# Patient Record
Sex: MALE | Race: WHITE | NOT HISPANIC OR LATINO | Employment: FULL TIME | URBAN - METROPOLITAN AREA
[De-identification: names, ages, dates, MRNs, and addresses within clinical notes are randomized per-mention and may not be internally consistent; named-entity substitution may affect disease eponyms.]

---

## 2022-01-01 ENCOUNTER — APPOINTMENT (OUTPATIENT)
Dept: ULTRASOUND IMAGING | Facility: HOSPITAL | Age: 57
DRG: 207 | End: 2022-01-01
Attending: INTERNAL MEDICINE

## 2022-01-01 ENCOUNTER — APPOINTMENT (OUTPATIENT)
Dept: CT IMAGING | Facility: HOSPITAL | Age: 57
DRG: 207 | End: 2022-01-01
Attending: PSYCHIATRY & NEUROLOGY

## 2022-01-01 ENCOUNTER — APPOINTMENT (OUTPATIENT)
Dept: NEUROLOGY | Facility: HOSPITAL | Age: 57
DRG: 207 | End: 2022-01-01
Attending: PSYCHIATRY & NEUROLOGY

## 2022-01-01 ENCOUNTER — APPOINTMENT (OUTPATIENT)
Dept: RADIOLOGY | Facility: HOSPITAL | Age: 57
DRG: 207 | End: 2022-01-01
Attending: INTERNAL MEDICINE

## 2022-01-01 ENCOUNTER — APPOINTMENT (OUTPATIENT)
Dept: CARDIOLOGY | Facility: HOSPITAL | Age: 57
DRG: 207 | End: 2022-01-01
Attending: INTERNAL MEDICINE

## 2022-01-01 ENCOUNTER — APPOINTMENT (OUTPATIENT)
Dept: MRI IMAGING | Facility: HOSPITAL | Age: 57
DRG: 207 | End: 2022-01-01
Attending: INTERNAL MEDICINE

## 2022-01-01 ENCOUNTER — HOSPITAL ENCOUNTER (EMERGENCY)
Facility: CLINIC | Age: 57
Discharge: OTHER INSTITUTION | End: 2022-01-14
Attending: EMERGENCY MEDICINE | Admitting: INTERNAL MEDICINE

## 2022-01-01 ENCOUNTER — APPOINTMENT (OUTPATIENT)
Dept: GENERAL RADIOLOGY | Facility: CLINIC | Age: 57
End: 2022-01-01
Attending: EMERGENCY MEDICINE

## 2022-01-01 ENCOUNTER — HOSPITAL ENCOUNTER (INPATIENT)
Facility: HOSPITAL | Age: 57
LOS: 8 days | DRG: 207 | End: 2022-01-22
Attending: INTERNAL MEDICINE | Admitting: INTERNAL MEDICINE

## 2022-01-01 ENCOUNTER — APPOINTMENT (OUTPATIENT)
Dept: CT IMAGING | Facility: HOSPITAL | Age: 57
DRG: 207 | End: 2022-01-01
Attending: INTERNAL MEDICINE

## 2022-01-01 VITALS
DIASTOLIC BLOOD PRESSURE: 106 MMHG | SYSTOLIC BLOOD PRESSURE: 149 MMHG | HEART RATE: 132 BPM | OXYGEN SATURATION: 91 % | RESPIRATION RATE: 20 BRPM | WEIGHT: 154.32 LBS

## 2022-01-01 VITALS
SYSTOLIC BLOOD PRESSURE: 132 MMHG | BODY MASS INDEX: 29.66 KG/M2 | TEMPERATURE: 103.1 F | RESPIRATION RATE: 26 BRPM | HEIGHT: 73 IN | WEIGHT: 223.77 LBS | DIASTOLIC BLOOD PRESSURE: 58 MMHG

## 2022-01-01 DIAGNOSIS — I21.3 ST ELEVATION MYOCARDIAL INFARCTION (STEMI), UNSPECIFIED ARTERY (H): ICD-10-CM

## 2022-01-01 DIAGNOSIS — I46.9 CARDIAC ARREST (H): ICD-10-CM

## 2022-01-01 LAB
ABO/RH(D): NORMAL
ACT BLD: 203 SECONDS (ref 74–150)
ACT BLD: 267 SECONDS (ref 74–150)
ACT BLD: 297 SECONDS (ref 74–150)
ALBUMIN SERPL-MCNC: 2.4 G/DL (ref 3.5–5)
ALBUMIN SERPL-MCNC: 2.6 G/DL (ref 3.5–5)
ALBUMIN SERPL-MCNC: 2.9 G/DL (ref 3.5–5)
ALBUMIN SERPL-MCNC: 3.6 G/DL (ref 3.4–5)
ALP SERPL-CCNC: 100 U/L (ref 45–120)
ALP SERPL-CCNC: 148 U/L (ref 40–150)
ALP SERPL-CCNC: 74 U/L (ref 45–120)
ALP SERPL-CCNC: 75 U/L (ref 45–120)
ALT SERPL W P-5'-P-CCNC: 123 U/L (ref 0–45)
ALT SERPL W P-5'-P-CCNC: 166 U/L (ref 0–45)
ALT SERPL W P-5'-P-CCNC: 242 U/L (ref 0–70)
ALT SERPL W P-5'-P-CCNC: 280 U/L (ref 0–45)
ALT SERPL W P-5'-P-CCNC: 56 U/L (ref 0–45)
ANION GAP SERPL CALCULATED.3IONS-SCNC: 10 MMOL/L (ref 5–18)
ANION GAP SERPL CALCULATED.3IONS-SCNC: 17 MMOL/L (ref 3–14)
ANION GAP SERPL CALCULATED.3IONS-SCNC: 7 MMOL/L (ref 5–18)
ANION GAP SERPL CALCULATED.3IONS-SCNC: 7 MMOL/L (ref 5–18)
ANION GAP SERPL CALCULATED.3IONS-SCNC: 8 MMOL/L (ref 5–18)
ANION GAP SERPL CALCULATED.3IONS-SCNC: 8 MMOL/L (ref 5–18)
ANION GAP SERPL CALCULATED.3IONS-SCNC: 9 MMOL/L (ref 5–18)
ANION GAP SERPL CALCULATED.3IONS-SCNC: 9 MMOL/L (ref 5–18)
ANTIBODY SCREEN: NEGATIVE
APTT PPP: 22 SECONDS (ref 22–38)
APTT PPP: 24 SECONDS (ref 22–38)
APTT PPP: 26 SECONDS (ref 22–38)
APTT PPP: 26 SECONDS (ref 22–38)
APTT PPP: 27 SECONDS (ref 22–38)
APTT PPP: 31 SECONDS (ref 22–38)
AST SERPL W P-5'-P-CCNC: 131 U/L (ref 0–40)
AST SERPL W P-5'-P-CCNC: 175 U/L (ref 0–45)
AST SERPL W P-5'-P-CCNC: 215 U/L (ref 0–40)
AST SERPL W P-5'-P-CCNC: 37 U/L (ref 0–40)
AST SERPL W P-5'-P-CCNC: 578 U/L (ref 0–40)
ATRIAL RATE - MUSE: 139 BPM
ATRIAL RATE - MUSE: 62 BPM
ATRIAL RATE - MUSE: 63 BPM
ATRIAL RATE - MUSE: 98 BPM
BACTERIA BLD CULT: NO GROWTH
BACTERIA SPEC CULT: ABNORMAL
BACTERIA SPT CULT: NO GROWTH
BASE EXCESS BLDA CALC-SCNC: -3.8 MMOL/L
BASE EXCESS BLDA CALC-SCNC: -5 MMOL/L
BASE EXCESS BLDA CALC-SCNC: -6.8 MMOL/L
BASE EXCESS BLDA CALC-SCNC: -8.4 MMOL/L
BASE EXCESS BLDA CALC-SCNC: 9 MMOL/L
BASE EXCESS BLDV CALC-SCNC: -14.7 MMOL/L (ref -7.7–1.9)
BASOPHILS # BLD AUTO: 0 10E3/UL (ref 0–0.2)
BASOPHILS # BLD AUTO: 0.1 10E3/UL (ref 0–0.2)
BASOPHILS NFR BLD AUTO: 0 %
BASOPHILS NFR BLD AUTO: 1 %
BILIRUB SERPL-MCNC: 0.4 MG/DL (ref 0–1)
BILIRUB SERPL-MCNC: 0.6 MG/DL (ref 0.2–1.3)
BILIRUB SERPL-MCNC: 0.6 MG/DL (ref 0–1)
BILIRUB SERPL-MCNC: 0.7 MG/DL (ref 0–1)
BUN SERPL-MCNC: 20 MG/DL (ref 7–30)
BUN SERPL-MCNC: 23 MG/DL (ref 8–22)
BUN SERPL-MCNC: 23 MG/DL (ref 8–22)
BUN SERPL-MCNC: 25 MG/DL (ref 8–22)
BUN SERPL-MCNC: 26 MG/DL (ref 8–22)
BUN SERPL-MCNC: 26 MG/DL (ref 8–22)
BUN SERPL-MCNC: 31 MG/DL (ref 8–22)
BUN SERPL-MCNC: 31 MG/DL (ref 8–22)
BUN SERPL-MCNC: 38 MG/DL (ref 8–22)
BUN SERPL-MCNC: 38 MG/DL (ref 8–22)
CALCIUM SERPL-MCNC: 7.4 MG/DL (ref 8.5–10.5)
CALCIUM SERPL-MCNC: 7.4 MG/DL (ref 8.5–10.5)
CALCIUM SERPL-MCNC: 7.5 MG/DL (ref 8.5–10.5)
CALCIUM SERPL-MCNC: 7.5 MG/DL (ref 8.5–10.5)
CALCIUM SERPL-MCNC: 7.8 MG/DL (ref 8.5–10.5)
CALCIUM SERPL-MCNC: 7.8 MG/DL (ref 8.5–10.5)
CALCIUM SERPL-MCNC: 8 MG/DL (ref 8.5–10.5)
CALCIUM SERPL-MCNC: 8 MG/DL (ref 8.5–10.5)
CALCIUM SERPL-MCNC: 8.1 MG/DL (ref 8.5–10.5)
CALCIUM SERPL-MCNC: 8.9 MG/DL (ref 8.5–10.1)
CALCIUM, IONIZED MEASURED: 1.06 MMOL/L (ref 1.11–1.3)
CHLORIDE BLD-SCNC: 108 MMOL/L (ref 94–109)
CHLORIDE BLD-SCNC: 109 MMOL/L (ref 98–107)
CHLORIDE BLD-SCNC: 111 MMOL/L (ref 98–107)
CHLORIDE BLD-SCNC: 112 MMOL/L (ref 98–107)
CHLORIDE BLD-SCNC: 113 MMOL/L (ref 98–107)
CHLORIDE BLD-SCNC: 116 MMOL/L (ref 98–107)
CHLORIDE BLD-SCNC: 116 MMOL/L (ref 98–107)
CHLORIDE BLD-SCNC: 117 MMOL/L (ref 98–107)
CHLORIDE BLD-SCNC: 118 MMOL/L (ref 98–107)
CHLORIDE BLD-SCNC: 118 MMOL/L (ref 98–107)
CO2 SERPL-SCNC: 15 MMOL/L (ref 20–32)
CO2 SERPL-SCNC: 15 MMOL/L (ref 22–31)
CO2 SERPL-SCNC: 17 MMOL/L (ref 22–31)
CO2 SERPL-SCNC: 19 MMOL/L (ref 22–31)
CO2 SERPL-SCNC: 20 MMOL/L (ref 22–31)
CO2 SERPL-SCNC: 20 MMOL/L (ref 22–31)
CO2 SERPL-SCNC: 22 MMOL/L (ref 22–31)
CO2 SERPL-SCNC: 27 MMOL/L (ref 22–31)
CO2 SERPL-SCNC: 30 MMOL/L (ref 22–31)
CO2 SERPL-SCNC: 36 MMOL/L (ref 22–31)
COHGB MFR BLD: 96.2 % (ref 96–97)
COHGB MFR BLD: 98 % (ref 92–100)
COHGB MFR BLD: 98.9 % (ref 96–97)
COHGB MFR BLD: 99.1 % (ref 96–97)
COHGB MFR BLD: 99.6 % (ref 96–97)
COHGB MFR BLD: 99.6 % (ref 96–97)
CREAT SERPL-MCNC: 0.96 MG/DL (ref 0.7–1.3)
CREAT SERPL-MCNC: 1.02 MG/DL (ref 0.7–1.3)
CREAT SERPL-MCNC: 1.03 MG/DL (ref 0.7–1.3)
CREAT SERPL-MCNC: 1.1 MG/DL (ref 0.7–1.3)
CREAT SERPL-MCNC: 1.12 MG/DL (ref 0.7–1.3)
CREAT SERPL-MCNC: 1.3 MG/DL (ref 0.7–1.3)
CREAT SERPL-MCNC: 1.38 MG/DL (ref 0.7–1.3)
CREAT SERPL-MCNC: 1.39 MG/DL (ref 0.7–1.3)
CREAT SERPL-MCNC: 1.4 MG/DL (ref 0.7–1.3)
CREAT SERPL-MCNC: 1.43 MG/DL (ref 0.66–1.25)
DIASTOLIC BLOOD PRESSURE - MUSE: NORMAL MMHG
EOSINOPHIL # BLD AUTO: 0 10E3/UL (ref 0–0.7)
EOSINOPHIL # BLD AUTO: 0.1 10E3/UL (ref 0–0.7)
EOSINOPHIL NFR BLD AUTO: 0 %
EOSINOPHIL NFR BLD AUTO: 1 %
ERYTHROCYTE [DISTWIDTH] IN BLOOD BY AUTOMATED COUNT: 13.3 % (ref 10–15)
ERYTHROCYTE [DISTWIDTH] IN BLOOD BY AUTOMATED COUNT: 13.4 % (ref 10–15)
ERYTHROCYTE [DISTWIDTH] IN BLOOD BY AUTOMATED COUNT: 14 % (ref 10–15)
ERYTHROCYTE [DISTWIDTH] IN BLOOD BY AUTOMATED COUNT: 14.5 % (ref 10–15)
ERYTHROCYTE [DISTWIDTH] IN BLOOD BY AUTOMATED COUNT: 14.7 % (ref 10–15)
ERYTHROCYTE [DISTWIDTH] IN BLOOD BY AUTOMATED COUNT: 14.9 % (ref 10–15)
ERYTHROCYTE [DISTWIDTH] IN BLOOD BY AUTOMATED COUNT: 15 % (ref 10–15)
ERYTHROCYTE [DISTWIDTH] IN BLOOD BY AUTOMATED COUNT: 15.1 % (ref 10–15)
ERYTHROCYTE [DISTWIDTH] IN BLOOD BY AUTOMATED COUNT: 15.1 % (ref 10–15)
FIBRINOGEN PPP-MCNC: 357 MG/DL (ref 170–490)
FIBRINOGEN PPP-MCNC: 367 MG/DL (ref 170–490)
FIBRINOGEN PPP-MCNC: 377 MG/DL (ref 170–490)
FIBRINOGEN PPP-MCNC: 407 MG/DL (ref 170–490)
FIBRINOGEN PPP-MCNC: 452 MG/DL (ref 170–490)
FIBRINOGEN PPP-MCNC: 470 MG/DL (ref 170–490)
FIBRINOGEN PPP-MCNC: 594 MG/DL (ref 170–490)
FIBRINOGEN PPP-MCNC: 610 MG/DL (ref 170–490)
FIBRINOGEN PPP-MCNC: 732 MG/DL (ref 170–490)
GFR SERPL CREATININE-BSD FRML MDRD: 58 ML/MIN/1.73M2
GFR SERPL CREATININE-BSD FRML MDRD: 59 ML/MIN/1.73M2
GFR SERPL CREATININE-BSD FRML MDRD: 59 ML/MIN/1.73M2
GFR SERPL CREATININE-BSD FRML MDRD: 60 ML/MIN/1.73M2
GFR SERPL CREATININE-BSD FRML MDRD: 64 ML/MIN/1.73M2
GFR SERPL CREATININE-BSD FRML MDRD: 77 ML/MIN/1.73M2
GFR SERPL CREATININE-BSD FRML MDRD: 79 ML/MIN/1.73M2
GFR SERPL CREATININE-BSD FRML MDRD: 85 ML/MIN/1.73M2
GFR SERPL CREATININE-BSD FRML MDRD: 86 ML/MIN/1.73M2
GFR SERPL CREATININE-BSD FRML MDRD: >90 ML/MIN/1.73M2
GLUCOSE BLD-MCNC: 103 MG/DL (ref 70–125)
GLUCOSE BLD-MCNC: 113 MG/DL (ref 70–125)
GLUCOSE BLD-MCNC: 120 MG/DL (ref 70–125)
GLUCOSE BLD-MCNC: 136 MG/DL (ref 70–125)
GLUCOSE BLD-MCNC: 147 MG/DL (ref 70–125)
GLUCOSE BLD-MCNC: 172 MG/DL (ref 70–125)
GLUCOSE BLD-MCNC: 175 MG/DL (ref 70–125)
GLUCOSE BLD-MCNC: 189 MG/DL (ref 70–125)
GLUCOSE BLD-MCNC: 189 MG/DL (ref 70–125)
GLUCOSE BLD-MCNC: 300 MG/DL (ref 70–99)
GLUCOSE BLDC GLUCOMTR-MCNC: 100 MG/DL (ref 70–99)
GLUCOSE BLDC GLUCOMTR-MCNC: 101 MG/DL (ref 70–99)
GLUCOSE BLDC GLUCOMTR-MCNC: 102 MG/DL (ref 70–99)
GLUCOSE BLDC GLUCOMTR-MCNC: 103 MG/DL (ref 70–99)
GLUCOSE BLDC GLUCOMTR-MCNC: 103 MG/DL (ref 70–99)
GLUCOSE BLDC GLUCOMTR-MCNC: 104 MG/DL (ref 70–99)
GLUCOSE BLDC GLUCOMTR-MCNC: 104 MG/DL (ref 70–99)
GLUCOSE BLDC GLUCOMTR-MCNC: 105 MG/DL (ref 70–99)
GLUCOSE BLDC GLUCOMTR-MCNC: 106 MG/DL (ref 70–99)
GLUCOSE BLDC GLUCOMTR-MCNC: 106 MG/DL (ref 70–99)
GLUCOSE BLDC GLUCOMTR-MCNC: 107 MG/DL (ref 70–99)
GLUCOSE BLDC GLUCOMTR-MCNC: 107 MG/DL (ref 70–99)
GLUCOSE BLDC GLUCOMTR-MCNC: 108 MG/DL (ref 70–99)
GLUCOSE BLDC GLUCOMTR-MCNC: 110 MG/DL (ref 70–99)
GLUCOSE BLDC GLUCOMTR-MCNC: 111 MG/DL (ref 70–99)
GLUCOSE BLDC GLUCOMTR-MCNC: 112 MG/DL (ref 70–99)
GLUCOSE BLDC GLUCOMTR-MCNC: 113 MG/DL (ref 70–99)
GLUCOSE BLDC GLUCOMTR-MCNC: 113 MG/DL (ref 70–99)
GLUCOSE BLDC GLUCOMTR-MCNC: 114 MG/DL (ref 70–99)
GLUCOSE BLDC GLUCOMTR-MCNC: 116 MG/DL (ref 70–99)
GLUCOSE BLDC GLUCOMTR-MCNC: 118 MG/DL (ref 70–99)
GLUCOSE BLDC GLUCOMTR-MCNC: 119 MG/DL (ref 70–99)
GLUCOSE BLDC GLUCOMTR-MCNC: 122 MG/DL (ref 70–99)
GLUCOSE BLDC GLUCOMTR-MCNC: 125 MG/DL (ref 70–99)
GLUCOSE BLDC GLUCOMTR-MCNC: 127 MG/DL (ref 70–99)
GLUCOSE BLDC GLUCOMTR-MCNC: 128 MG/DL (ref 70–99)
GLUCOSE BLDC GLUCOMTR-MCNC: 129 MG/DL (ref 70–99)
GLUCOSE BLDC GLUCOMTR-MCNC: 131 MG/DL (ref 70–99)
GLUCOSE BLDC GLUCOMTR-MCNC: 139 MG/DL (ref 70–99)
GLUCOSE BLDC GLUCOMTR-MCNC: 142 MG/DL (ref 70–99)
GLUCOSE BLDC GLUCOMTR-MCNC: 143 MG/DL (ref 70–99)
GLUCOSE BLDC GLUCOMTR-MCNC: 146 MG/DL (ref 70–99)
GLUCOSE BLDC GLUCOMTR-MCNC: 147 MG/DL (ref 70–99)
GLUCOSE BLDC GLUCOMTR-MCNC: 147 MG/DL (ref 70–99)
GLUCOSE BLDC GLUCOMTR-MCNC: 148 MG/DL (ref 70–99)
GLUCOSE BLDC GLUCOMTR-MCNC: 155 MG/DL (ref 70–99)
GLUCOSE BLDC GLUCOMTR-MCNC: 156 MG/DL (ref 70–99)
GLUCOSE BLDC GLUCOMTR-MCNC: 166 MG/DL (ref 70–99)
GLUCOSE BLDC GLUCOMTR-MCNC: 168 MG/DL (ref 70–99)
GLUCOSE BLDC GLUCOMTR-MCNC: 174 MG/DL (ref 70–99)
GLUCOSE BLDC GLUCOMTR-MCNC: 176 MG/DL (ref 70–99)
GLUCOSE BLDC GLUCOMTR-MCNC: 177 MG/DL (ref 70–99)
GLUCOSE BLDC GLUCOMTR-MCNC: 179 MG/DL (ref 70–99)
GLUCOSE BLDC GLUCOMTR-MCNC: 197 MG/DL (ref 70–99)
GLUCOSE BLDC GLUCOMTR-MCNC: 209 MG/DL (ref 70–99)
GLUCOSE BLDC GLUCOMTR-MCNC: 216 MG/DL (ref 70–99)
GLUCOSE BLDC GLUCOMTR-MCNC: 217 MG/DL (ref 70–99)
GLUCOSE BLDC GLUCOMTR-MCNC: 246 MG/DL (ref 70–99)
GLUCOSE BLDC GLUCOMTR-MCNC: 248 MG/DL (ref 70–99)
GLUCOSE BLDC GLUCOMTR-MCNC: 248 MG/DL (ref 70–99)
GLUCOSE BLDC GLUCOMTR-MCNC: 82 MG/DL (ref 70–99)
GLUCOSE BLDC GLUCOMTR-MCNC: 86 MG/DL (ref 70–99)
GLUCOSE BLDC GLUCOMTR-MCNC: 95 MG/DL (ref 70–99)
GLUCOSE BLDC GLUCOMTR-MCNC: 98 MG/DL (ref 70–99)
GLUCOSE BLDC GLUCOMTR-MCNC: 99 MG/DL (ref 70–99)
GLUCOSE BLDC GLUCOMTR-MCNC: 99 MG/DL (ref 70–99)
GRAM STAIN RESULT: NORMAL
GRAM STAIN RESULT: NORMAL
HBA1C MFR BLD: 5.5 %
HCO3 BLD-SCNC: 18 MMOL/L (ref 23–29)
HCO3 BLD-SCNC: 20 MMOL/L (ref 23–29)
HCO3 BLD-SCNC: 21 MMOL/L (ref 23–29)
HCO3 BLD-SCNC: 22 MMOL/L (ref 23–29)
HCO3 BLD-SCNC: 31 MMOL/L (ref 23–29)
HCO3 BLDA-SCNC: 20 MMOL/L (ref 21–28)
HCO3 BLDV-SCNC: 14 MMOL/L (ref 21–28)
HCO3 BLDV-SCNC: 15 MMOL/L (ref 21–28)
HCT VFR BLD AUTO: 28.6 % (ref 40–53)
HCT VFR BLD AUTO: 29.4 % (ref 40–53)
HCT VFR BLD AUTO: 30.1 % (ref 40–53)
HCT VFR BLD AUTO: 30.2 % (ref 40–53)
HCT VFR BLD AUTO: 30.8 % (ref 40–53)
HCT VFR BLD AUTO: 31.9 % (ref 40–53)
HCT VFR BLD AUTO: 32.1 % (ref 40–53)
HCT VFR BLD AUTO: 40.6 % (ref 40–53)
HCT VFR BLD AUTO: 50.6 % (ref 40–53)
HGB BLD-MCNC: 10.1 G/DL (ref 13.3–17.7)
HGB BLD-MCNC: 10.2 G/DL (ref 13.3–17.7)
HGB BLD-MCNC: 10.6 G/DL (ref 13.3–17.7)
HGB BLD-MCNC: 10.9 G/DL (ref 13.3–17.7)
HGB BLD-MCNC: 12.7 G/DL (ref 13.3–17.7)
HGB BLD-MCNC: 13.7 G/DL (ref 13.3–17.7)
HGB BLD-MCNC: 16.5 G/DL (ref 13.3–17.7)
HGB BLD-MCNC: 9.4 G/DL (ref 13.3–17.7)
HGB BLD-MCNC: 9.8 G/DL (ref 13.3–17.7)
HGB BLD-MCNC: 9.9 G/DL (ref 13.3–17.7)
IMM GRANULOCYTES # BLD: 0.1 10E3/UL
IMM GRANULOCYTES # BLD: 0.2 10E3/UL
IMM GRANULOCYTES # BLD: 0.5 10E3/UL
IMM GRANULOCYTES NFR BLD: 1 %
IMM GRANULOCYTES NFR BLD: 3 %
INR PPP: 1.04 (ref 0.85–1.15)
INR PPP: 1.12 (ref 0.9–1.15)
INR PPP: 1.14 (ref 0.9–1.15)
INR PPP: 1.18 (ref 0.9–1.15)
INR PPP: 1.24 (ref 0.9–1.15)
INR PPP: 1.26 (ref 0.9–1.15)
INR PPP: 1.29 (ref 0.9–1.15)
INTERPRETATION ECG - MUSE: NORMAL
ION CA PH 7.4: 1.02 MMOL/L (ref 1.11–1.3)
LACTATE BLD-SCNC: 1.5 MMOL/L
LACTATE BLD-SCNC: 9.5 MMOL/L
LEVETIRACETAM (KEPPRA): 24.2 UG/ML (ref 6–46)
LVEF ECHO: NORMAL
LYMPHOCYTES # BLD AUTO: 1.1 10E3/UL (ref 0.8–5.3)
LYMPHOCYTES # BLD AUTO: 1.6 10E3/UL (ref 0.8–5.3)
LYMPHOCYTES # BLD AUTO: 1.6 10E3/UL (ref 0.8–5.3)
LYMPHOCYTES # BLD AUTO: 2 10E3/UL (ref 0.8–5.3)
LYMPHOCYTES # BLD AUTO: 4.5 10E3/UL (ref 0.8–5.3)
LYMPHOCYTES NFR BLD AUTO: 10 %
LYMPHOCYTES NFR BLD AUTO: 31 %
LYMPHOCYTES NFR BLD AUTO: 7 %
LYMPHOCYTES NFR BLD AUTO: 7 %
LYMPHOCYTES NFR BLD AUTO: 8 %
Lab: NORMAL
MAGNESIUM SERPL-MCNC: 1.5 MG/DL (ref 1.8–2.6)
MAGNESIUM SERPL-MCNC: 1.9 MG/DL (ref 1.8–2.6)
MAGNESIUM SERPL-MCNC: 2.1 MG/DL (ref 1.8–2.6)
MAGNESIUM SERPL-MCNC: 2.2 MG/DL (ref 1.8–2.6)
MAGNESIUM SERPL-MCNC: 2.2 MG/DL (ref 1.8–2.6)
MAGNESIUM SERPL-MCNC: 2.3 MG/DL (ref 1.8–2.6)
MAGNESIUM SERPL-MCNC: 2.3 MG/DL (ref 1.8–2.6)
MAGNESIUM SERPL-MCNC: 2.7 MG/DL (ref 1.6–2.3)
MAYO MISC RESULT: ABNORMAL
MCH RBC QN AUTO: 30.1 PG (ref 26.5–33)
MCH RBC QN AUTO: 30.3 PG (ref 26.5–33)
MCH RBC QN AUTO: 30.3 PG (ref 26.5–33)
MCH RBC QN AUTO: 30.4 PG (ref 26.5–33)
MCH RBC QN AUTO: 30.4 PG (ref 26.5–33)
MCH RBC QN AUTO: 30.6 PG (ref 26.5–33)
MCH RBC QN AUTO: 31 PG (ref 26.5–33)
MCH RBC QN AUTO: 31 PG (ref 26.5–33)
MCH RBC QN AUTO: 31.1 PG (ref 26.5–33)
MCHC RBC AUTO-ENTMCNC: 32.6 G/DL (ref 31.5–36.5)
MCHC RBC AUTO-ENTMCNC: 32.8 G/DL (ref 31.5–36.5)
MCHC RBC AUTO-ENTMCNC: 32.9 G/DL (ref 31.5–36.5)
MCHC RBC AUTO-ENTMCNC: 32.9 G/DL (ref 31.5–36.5)
MCHC RBC AUTO-ENTMCNC: 33 G/DL (ref 31.5–36.5)
MCHC RBC AUTO-ENTMCNC: 33.3 G/DL (ref 31.5–36.5)
MCHC RBC AUTO-ENTMCNC: 33.7 G/DL (ref 31.5–36.5)
MCHC RBC AUTO-ENTMCNC: 33.8 G/DL (ref 31.5–36.5)
MCHC RBC AUTO-ENTMCNC: 34.2 G/DL (ref 31.5–36.5)
MCV RBC AUTO: 91 FL (ref 78–100)
MCV RBC AUTO: 91 FL (ref 78–100)
MCV RBC AUTO: 92 FL (ref 78–100)
MCV RBC AUTO: 93 FL (ref 78–100)
MONOCYTES # BLD AUTO: 0.3 10E3/UL (ref 0–1.3)
MONOCYTES # BLD AUTO: 0.7 10E3/UL (ref 0–1.3)
MONOCYTES # BLD AUTO: 0.7 10E3/UL (ref 0–1.3)
MONOCYTES # BLD AUTO: 1.2 10E3/UL (ref 0–1.3)
MONOCYTES # BLD AUTO: 1.2 10E3/UL (ref 0–1.3)
MONOCYTES NFR BLD AUTO: 2 %
MONOCYTES NFR BLD AUTO: 4 %
MONOCYTES NFR BLD AUTO: 5 %
MONOCYTES NFR BLD AUTO: 5 %
MONOCYTES NFR BLD AUTO: 6 %
NEUTROPHILS # BLD AUTO: 14.3 10E3/UL (ref 1.6–8.3)
NEUTROPHILS # BLD AUTO: 15.7 10E3/UL (ref 1.6–8.3)
NEUTROPHILS # BLD AUTO: 17.1 10E3/UL (ref 1.6–8.3)
NEUTROPHILS # BLD AUTO: 21.3 10E3/UL (ref 1.6–8.3)
NEUTROPHILS # BLD AUTO: 9.1 10E3/UL (ref 1.6–8.3)
NEUTROPHILS NFR BLD AUTO: 62 %
NEUTROPHILS NFR BLD AUTO: 83 %
NEUTROPHILS NFR BLD AUTO: 87 %
NRBC # BLD AUTO: 0 10E3/UL
NRBC BLD AUTO-RTO: 0 /100
O2/TOTAL GAS SETTING VFR VENT: 100 %
O2/TOTAL GAS SETTING VFR VENT: 40 %
O2/TOTAL GAS SETTING VFR VENT: 50 %
OXYHGB MFR BLD: 95.6 % (ref 96–97)
OXYHGB MFR BLD: 97.5 % (ref 96–97)
OXYHGB MFR BLD: 98.5 % (ref 96–97)
OXYHGB MFR BLD: >98.5 % (ref 96–97)
OXYHGB MFR BLD: >98.5 % (ref 96–97)
OXYHGB MFR BLDV: 91 % (ref 70–75)
P AXIS - MUSE: 50 DEGREES
P AXIS - MUSE: 54 DEGREES
P AXIS - MUSE: 57 DEGREES
P AXIS - MUSE: NORMAL DEGREES
PCO2 BLD: 34 MM HG (ref 35–45)
PCO2 BLD: 34 MM HG (ref 35–45)
PCO2 BLD: 35 MM HG (ref 35–45)
PCO2 BLD: 41 MM HG (ref 35–45)
PCO2 BLD: 53 MM HG (ref 35–45)
PCO2 BLDA: 45 MM HG (ref 35–45)
PCO2 BLDV: 40 MM HG (ref 40–50)
PCO2 BLDV: 41 MM HG (ref 40–50)
PEEP: 14 CM H2O
PEEP: 5 CM H2O
PEEP: 8 CM H2O
PEEP: 8 CM H2O
PERFORMING LABORATORY: NORMAL
PH BLD: 7.27 [PH] (ref 7.37–7.44)
PH BLD: 7.35 [PH] (ref 7.37–7.44)
PH BLD: 7.38 [PH] (ref 7.37–7.44)
PH BLD: 7.4 [PH] (ref 7.37–7.44)
PH BLD: 7.41 [PH] (ref 7.37–7.44)
PH BLDA: 7.25 [PH] (ref 7.35–7.45)
PH BLDV: 7.15 [PH] (ref 7.32–7.43)
PH BLDV: 7.16 [PH] (ref 7.32–7.43)
PH: 7.34 (ref 7.35–7.45)
PHOSPHATE SERPL-MCNC: 1.6 MG/DL (ref 2.5–4.5)
PHOSPHATE SERPL-MCNC: 2.8 MG/DL (ref 2.5–4.5)
PHOSPHATE SERPL-MCNC: 3.1 MG/DL (ref 2.5–4.5)
PHOSPHATE SERPL-MCNC: 4.4 MG/DL (ref 2.5–4.5)
PLATELET # BLD AUTO: 107 10E3/UL (ref 150–450)
PLATELET # BLD AUTO: 112 10E3/UL (ref 150–450)
PLATELET # BLD AUTO: 114 10E3/UL (ref 150–450)
PLATELET # BLD AUTO: 116 10E3/UL (ref 150–450)
PLATELET # BLD AUTO: 134 10E3/UL (ref 150–450)
PLATELET # BLD AUTO: 182 10E3/UL (ref 150–450)
PLATELET # BLD AUTO: 216 10E3/UL (ref 150–450)
PLATELET # BLD AUTO: 87 10E3/UL (ref 150–450)
PLATELET # BLD AUTO: 98 10E3/UL (ref 150–450)
PO2 BLD: 113 MM HG (ref 80–90)
PO2 BLD: 142 MM HG (ref 80–90)
PO2 BLD: 158 MM HG (ref 80–90)
PO2 BLD: 204 MM HG (ref 80–90)
PO2 BLD: 80 MM HG (ref 80–90)
PO2 BLDA: 122 MM HG (ref 80–105)
PO2 BLDV: 77 MM HG (ref 25–47)
PO2 BLDV: 82 MM HG (ref 25–47)
POTASSIUM BLD-SCNC: 2.9 MMOL/L (ref 3.5–5)
POTASSIUM BLD-SCNC: 3.1 MMOL/L (ref 3.5–5)
POTASSIUM BLD-SCNC: 3.1 MMOL/L (ref 3.5–5)
POTASSIUM BLD-SCNC: 3.2 MMOL/L (ref 3.4–5.3)
POTASSIUM BLD-SCNC: 3.2 MMOL/L (ref 3.5–5)
POTASSIUM BLD-SCNC: 3.3 MMOL/L (ref 3.5–5)
POTASSIUM BLD-SCNC: 3.5 MMOL/L (ref 3.5–5)
POTASSIUM BLD-SCNC: 3.6 MMOL/L (ref 3.5–5)
POTASSIUM BLD-SCNC: 3.8 MMOL/L (ref 3.5–5)
POTASSIUM BLD-SCNC: 3.8 MMOL/L (ref 3.5–5)
POTASSIUM BLD-SCNC: 4 MMOL/L (ref 3.5–5)
POTASSIUM BLD-SCNC: 4.1 MMOL/L (ref 3.5–5)
POTASSIUM BLD-SCNC: 4.2 MMOL/L (ref 3.5–5)
POTASSIUM BLD-SCNC: 4.3 MMOL/L (ref 3.5–5)
PR INTERVAL - MUSE: 144 MS
PR INTERVAL - MUSE: 148 MS
PR INTERVAL - MUSE: 150 MS
PR INTERVAL - MUSE: 154 MS
PROT SERPL-MCNC: 4.7 G/DL (ref 6–8)
PROT SERPL-MCNC: 5 G/DL (ref 6–8)
PROT SERPL-MCNC: 5.4 G/DL (ref 6–8)
PROT SERPL-MCNC: 7.3 G/DL (ref 6.8–8.8)
QRS DURATION - MUSE: 132 MS
QRS DURATION - MUSE: 78 MS
QRS DURATION - MUSE: 80 MS
QRS DURATION - MUSE: 90 MS
QT - MUSE: 290 MS
QT - MUSE: 354 MS
QT - MUSE: 398 MS
QT - MUSE: 494 MS
QTC - MUSE: 403 MS
QTC - MUSE: 441 MS
QTC - MUSE: 451 MS
QTC - MUSE: 505 MS
R AXIS - MUSE: -53 DEGREES
R AXIS - MUSE: -63 DEGREES
R AXIS - MUSE: 25 DEGREES
R AXIS - MUSE: 45 DEGREES
RATE: 16 RR/MIN
RATE: 20 RR/MIN
RBC # BLD AUTO: 3.12 10E6/UL (ref 4.4–5.9)
RBC # BLD AUTO: 3.16 10E6/UL (ref 4.4–5.9)
RBC # BLD AUTO: 3.26 10E6/UL (ref 4.4–5.9)
RBC # BLD AUTO: 3.29 10E6/UL (ref 4.4–5.9)
RBC # BLD AUTO: 3.33 10E6/UL (ref 4.4–5.9)
RBC # BLD AUTO: 3.5 10E6/UL (ref 4.4–5.9)
RBC # BLD AUTO: 3.51 10E6/UL (ref 4.4–5.9)
RBC # BLD AUTO: 4.47 10E6/UL (ref 4.4–5.9)
RBC # BLD AUTO: 5.43 10E6/UL (ref 4.4–5.9)
SAO2 % BLDV: 91 % (ref 94–100)
SARS-COV-2 RNA RESP QL NAA+PROBE: NEGATIVE
SODIUM SERPL-SCNC: 138 MMOL/L (ref 136–145)
SODIUM SERPL-SCNC: 140 MMOL/L (ref 133–144)
SODIUM SERPL-SCNC: 140 MMOL/L (ref 136–145)
SODIUM SERPL-SCNC: 144 MMOL/L (ref 136–145)
SODIUM SERPL-SCNC: 145 MMOL/L (ref 136–145)
SODIUM SERPL-SCNC: 146 MMOL/L (ref 136–145)
SODIUM SERPL-SCNC: 148 MMOL/L (ref 136–145)
SODIUM SERPL-SCNC: 149 MMOL/L (ref 136–145)
SODIUM SERPL-SCNC: 152 MMOL/L (ref 136–145)
SODIUM SERPL-SCNC: 152 MMOL/L (ref 136–145)
SPECIMEN EXPIRATION DATE: NORMAL
SPECIMEN STATUS: NORMAL
SYSTOLIC BLOOD PRESSURE - MUSE: NORMAL MMHG
T AXIS - MUSE: 169 DEGREES
T AXIS - MUSE: 53 DEGREES
T AXIS - MUSE: 85 DEGREES
T AXIS - MUSE: 95 DEGREES
TEMPERATURE: 37 DEGREES C
TEST NAME: NORMAL
TRIGL SERPL-MCNC: 228 MG/DL
TROPONIN I SERPL HS-MCNC: 1182 NG/L
TROPONIN T BLD-MCNC: 0.87 UG/L
UFH PPP CHRO-ACNC: 0.15 IU/ML
UFH PPP CHRO-ACNC: 0.43 IU/ML
UFH PPP CHRO-ACNC: 0.43 IU/ML
UFH PPP CHRO-ACNC: 0.47 IU/ML
UFH PPP CHRO-ACNC: 0.56 IU/ML
VANCOMYCIN SERPL-MCNC: 16.2 MG/L
VANCOMYCIN SERPL-MCNC: 17 MG/L
VENTILATION MODE: ABNORMAL
VENTILATOR TIDAL VOLUME: 500 ML
VENTRICULAR RATE- MUSE: 139 BPM
VENTRICULAR RATE- MUSE: 62 BPM
VENTRICULAR RATE- MUSE: 63 BPM
VENTRICULAR RATE- MUSE: 98 BPM
WBC # BLD AUTO: 10.9 10E3/UL (ref 4–11)
WBC # BLD AUTO: 13.8 10E3/UL (ref 4–11)
WBC # BLD AUTO: 14.6 10E3/UL (ref 4–11)
WBC # BLD AUTO: 15.2 10E3/UL (ref 4–11)
WBC # BLD AUTO: 16.2 10E3/UL (ref 4–11)
WBC # BLD AUTO: 18.8 10E3/UL (ref 4–11)
WBC # BLD AUTO: 18.8 10E3/UL (ref 4–11)
WBC # BLD AUTO: 19.5 10E3/UL (ref 4–11)
WBC # BLD AUTO: 24.1 10E3/UL (ref 4–11)

## 2022-01-01 PROCEDURE — 85025 COMPLETE CBC W/AUTO DIFF WBC: CPT | Performed by: INTERNAL MEDICINE

## 2022-01-01 PROCEDURE — 84132 ASSAY OF SERUM POTASSIUM: CPT | Performed by: INTERNAL MEDICINE

## 2022-01-01 PROCEDURE — 99285 EMERGENCY DEPT VISIT HI MDM: CPT | Mod: 25

## 2022-01-01 PROCEDURE — 250N000013 HC RX MED GY IP 250 OP 250 PS 637: Performed by: INTERNAL MEDICINE

## 2022-01-01 PROCEDURE — 999N000157 HC STATISTIC RCP TIME EA 10 MIN

## 2022-01-01 PROCEDURE — C9113 INJ PANTOPRAZOLE SODIUM, VIA: HCPCS | Performed by: INTERNAL MEDICINE

## 2022-01-01 PROCEDURE — 250N000009 HC RX 250: Performed by: NURSE PRACTITIONER

## 2022-01-01 PROCEDURE — 84460 ALANINE AMINO (ALT) (SGPT): CPT | Performed by: INTERNAL MEDICINE

## 2022-01-01 PROCEDURE — 93005 ELECTROCARDIOGRAM TRACING: CPT

## 2022-01-01 PROCEDURE — C1751 CATH, INF, PER/CENT/MIDLINE: HCPCS | Performed by: INTERNAL MEDICINE

## 2022-01-01 PROCEDURE — 250N000011 HC RX IP 250 OP 636: Performed by: INTERNAL MEDICINE

## 2022-01-01 PROCEDURE — 99232 SBSQ HOSP IP/OBS MODERATE 35: CPT | Performed by: PSYCHIATRY & NEUROLOGY

## 2022-01-01 PROCEDURE — 99291 CRITICAL CARE FIRST HOUR: CPT | Performed by: INTERNAL MEDICINE

## 2022-01-01 PROCEDURE — C1887 CATHETER, GUIDING: HCPCS | Performed by: INTERNAL MEDICINE

## 2022-01-01 PROCEDURE — 87205 SMEAR GRAM STAIN: CPT | Performed by: INTERNAL MEDICINE

## 2022-01-01 PROCEDURE — 93010 ELECTROCARDIOGRAM REPORT: CPT | Performed by: INTERNAL MEDICINE

## 2022-01-01 PROCEDURE — 82805 BLOOD GASES W/O2 SATURATION: CPT | Performed by: INTERNAL MEDICINE

## 2022-01-01 PROCEDURE — 258N000003 HC RX IP 258 OP 636: Performed by: INTERNAL MEDICINE

## 2022-01-01 PROCEDURE — 99291 CRITICAL CARE FIRST HOUR: CPT | Mod: 25 | Performed by: INTERNAL MEDICINE

## 2022-01-01 PROCEDURE — 85520 HEPARIN ASSAY: CPT | Performed by: INTERNAL MEDICINE

## 2022-01-01 PROCEDURE — 84478 ASSAY OF TRIGLYCERIDES: CPT | Performed by: INTERNAL MEDICINE

## 2022-01-01 PROCEDURE — 99222 1ST HOSP IP/OBS MODERATE 55: CPT | Mod: 25 | Performed by: INTERNAL MEDICINE

## 2022-01-01 PROCEDURE — 70551 MRI BRAIN STEM W/O DYE: CPT

## 2022-01-01 PROCEDURE — 85384 FIBRINOGEN ACTIVITY: CPT | Performed by: INTERNAL MEDICINE

## 2022-01-01 PROCEDURE — 250N000009 HC RX 250: Performed by: EMERGENCY MEDICINE

## 2022-01-01 PROCEDURE — 94003 VENT MGMT INPAT SUBQ DAY: CPT

## 2022-01-01 PROCEDURE — C1769 GUIDE WIRE: HCPCS | Performed by: INTERNAL MEDICINE

## 2022-01-01 PROCEDURE — 5A1955Z RESPIRATORY VENTILATION, GREATER THAN 96 CONSECUTIVE HOURS: ICD-10-PCS | Performed by: INTERNAL MEDICINE

## 2022-01-01 PROCEDURE — 83735 ASSAY OF MAGNESIUM: CPT | Performed by: INTERNAL MEDICINE

## 2022-01-01 PROCEDURE — 84484 ASSAY OF TROPONIN QUANT: CPT

## 2022-01-01 PROCEDURE — 71045 X-RAY EXAM CHEST 1 VIEW: CPT | Mod: 77

## 2022-01-01 PROCEDURE — 85347 COAGULATION TIME ACTIVATED: CPT

## 2022-01-01 PROCEDURE — C9460 INJECTION, CANGRELOR: HCPCS | Performed by: INTERNAL MEDICINE

## 2022-01-01 PROCEDURE — 250N000009 HC RX 250: Performed by: INTERNAL MEDICINE

## 2022-01-01 PROCEDURE — 250N000011 HC RX IP 250 OP 636

## 2022-01-01 PROCEDURE — 85730 THROMBOPLASTIN TIME PARTIAL: CPT | Performed by: INTERNAL MEDICINE

## 2022-01-01 PROCEDURE — 200N000001 HC R&B ICU

## 2022-01-01 PROCEDURE — 99232 SBSQ HOSP IP/OBS MODERATE 35: CPT | Mod: 25 | Performed by: INTERNAL MEDICINE

## 2022-01-01 PROCEDURE — C1725 CATH, TRANSLUMIN NON-LASER: HCPCS | Performed by: INTERNAL MEDICINE

## 2022-01-01 PROCEDURE — 3E043XZ INTRODUCTION OF VASOPRESSOR INTO CENTRAL VEIN, PERCUTANEOUS APPROACH: ICD-10-PCS | Performed by: INTERNAL MEDICINE

## 2022-01-01 PROCEDURE — P9047 ALBUMIN (HUMAN), 25%, 50ML: HCPCS | Performed by: INTERNAL MEDICINE

## 2022-01-01 PROCEDURE — 92978 ENDOLUMINL IVUS OCT C 1ST: CPT | Performed by: INTERNAL MEDICINE

## 2022-01-01 PROCEDURE — 99292 CRITICAL CARE ADDL 30 MIN: CPT | Performed by: INTERNAL MEDICINE

## 2022-01-01 PROCEDURE — 99207 PR NO BILLABLE SERVICE THIS VISIT: CPT | Performed by: INTERNAL MEDICINE

## 2022-01-01 PROCEDURE — 94640 AIRWAY INHALATION TREATMENT: CPT

## 2022-01-01 PROCEDURE — 80053 COMPREHEN METABOLIC PANEL: CPT | Performed by: EMERGENCY MEDICINE

## 2022-01-01 PROCEDURE — 93979 VASCULAR STUDY: CPT

## 2022-01-01 PROCEDURE — 6A4Z0ZZ HYPOTHERMIA, SINGLE: ICD-10-PCS | Performed by: INTERNAL MEDICINE

## 2022-01-01 PROCEDURE — 95822 EEG COMA OR SLEEP ONLY: CPT | Mod: 26 | Performed by: PSYCHIATRY & NEUROLOGY

## 2022-01-01 PROCEDURE — 93458 L HRT ARTERY/VENTRICLE ANGIO: CPT | Mod: XU | Performed by: INTERNAL MEDICINE

## 2022-01-01 PROCEDURE — 84100 ASSAY OF PHOSPHORUS: CPT | Performed by: INTERNAL MEDICINE

## 2022-01-01 PROCEDURE — 82040 ASSAY OF SERUM ALBUMIN: CPT | Performed by: EMERGENCY MEDICINE

## 2022-01-01 PROCEDURE — 82803 BLOOD GASES ANY COMBINATION: CPT

## 2022-01-01 PROCEDURE — 80202 ASSAY OF VANCOMYCIN: CPT | Performed by: INTERNAL MEDICINE

## 2022-01-01 PROCEDURE — 86901 BLOOD TYPING SEROLOGIC RH(D): CPT | Performed by: EMERGENCY MEDICINE

## 2022-01-01 PROCEDURE — 999N000185 HC STATISTIC TRANSPORT TIME EA 15 MIN

## 2022-01-01 PROCEDURE — 85018 HEMOGLOBIN: CPT | Performed by: INTERNAL MEDICINE

## 2022-01-01 PROCEDURE — 83520 IMMUNOASSAY QUANT NOS NONAB: CPT | Performed by: PSYCHIATRY & NEUROLOGY

## 2022-01-01 PROCEDURE — 71045 X-RAY EXAM CHEST 1 VIEW: CPT

## 2022-01-01 PROCEDURE — 85610 PROTHROMBIN TIME: CPT | Performed by: INTERNAL MEDICINE

## 2022-01-01 PROCEDURE — 0BJ08ZZ INSPECTION OF TRACHEOBRONCHIAL TREE, VIA NATURAL OR ARTIFICIAL OPENING ENDOSCOPIC: ICD-10-PCS | Performed by: INTERNAL MEDICINE

## 2022-01-01 PROCEDURE — 36415 COLL VENOUS BLD VENIPUNCTURE: CPT | Performed by: EMERGENCY MEDICINE

## 2022-01-01 PROCEDURE — 36556 INSERT NON-TUNNEL CV CATH: CPT | Performed by: INTERNAL MEDICINE

## 2022-01-01 PROCEDURE — 83735 ASSAY OF MAGNESIUM: CPT | Performed by: EMERGENCY MEDICINE

## 2022-01-01 PROCEDURE — 82040 ASSAY OF SERUM ALBUMIN: CPT | Performed by: INTERNAL MEDICINE

## 2022-01-01 PROCEDURE — 99233 SBSQ HOSP IP/OBS HIGH 50: CPT | Performed by: INTERNAL MEDICINE

## 2022-01-01 PROCEDURE — C9606 PERC D-E COR REVASC W AMI S: HCPCS | Performed by: INTERNAL MEDICINE

## 2022-01-01 PROCEDURE — 82310 ASSAY OF CALCIUM: CPT | Performed by: INTERNAL MEDICINE

## 2022-01-01 PROCEDURE — 93005 ELECTROCARDIOGRAM TRACING: CPT | Performed by: INTERNAL MEDICINE

## 2022-01-01 PROCEDURE — 99207 PR APP CREDIT; MD BILLING SHARED VISIT: CPT | Performed by: INTERNAL MEDICINE

## 2022-01-01 PROCEDURE — 80051 ELECTROLYTE PANEL: CPT | Performed by: INTERNAL MEDICINE

## 2022-01-01 PROCEDURE — 85610 PROTHROMBIN TIME: CPT | Performed by: EMERGENCY MEDICINE

## 2022-01-01 PROCEDURE — 99207 PR CDG-CHARGE REQUIRED MANUAL ENTRY: CPT | Performed by: INTERNAL MEDICINE

## 2022-01-01 PROCEDURE — 85027 COMPLETE CBC AUTOMATED: CPT | Performed by: INTERNAL MEDICINE

## 2022-01-01 PROCEDURE — 87077 CULTURE AEROBIC IDENTIFY: CPT | Performed by: INTERNAL MEDICINE

## 2022-01-01 PROCEDURE — 250N000012 HC RX MED GY IP 250 OP 636 PS 637: Performed by: INTERNAL MEDICINE

## 2022-01-01 PROCEDURE — 99292 CRITICAL CARE ADDL 30 MIN: CPT | Mod: 25 | Performed by: INTERNAL MEDICINE

## 2022-01-01 PROCEDURE — 36569 INSJ PICC 5 YR+ W/O IMAGING: CPT

## 2022-01-01 PROCEDURE — 95822 EEG COMA OR SLEEP ONLY: CPT

## 2022-01-01 PROCEDURE — 87635 SARS-COV-2 COVID-19 AMP PRB: CPT | Performed by: EMERGENCY MEDICINE

## 2022-01-01 PROCEDURE — 99238 HOSP IP/OBS DSCHRG MGMT 30/<: CPT | Performed by: INTERNAL MEDICINE

## 2022-01-01 PROCEDURE — 99153 MOD SED SAME PHYS/QHP EA: CPT | Performed by: INTERNAL MEDICINE

## 2022-01-01 PROCEDURE — 82330 ASSAY OF CALCIUM: CPT | Performed by: INTERNAL MEDICINE

## 2022-01-01 PROCEDURE — 84484 ASSAY OF TROPONIN QUANT: CPT | Performed by: EMERGENCY MEDICINE

## 2022-01-01 PROCEDURE — 99152 MOD SED SAME PHYS/QHP 5/>YRS: CPT | Performed by: INTERNAL MEDICINE

## 2022-01-01 PROCEDURE — 82805 BLOOD GASES W/O2 SATURATION: CPT | Performed by: EMERGENCY MEDICINE

## 2022-01-01 PROCEDURE — 31500 INSERT EMERGENCY AIRWAY: CPT

## 2022-01-01 PROCEDURE — 272N000452 HC KIT SHRLOCK 5FR POWER PICC TRIPLE LUMEN

## 2022-01-01 PROCEDURE — 94640 AIRWAY INHALATION TREATMENT: CPT | Mod: 76

## 2022-01-01 PROCEDURE — 31645 BRNCHSC W/THER ASPIR 1ST: CPT | Performed by: INTERNAL MEDICINE

## 2022-01-01 PROCEDURE — 93306 TTE W/DOPPLER COMPLETE: CPT | Mod: 26 | Performed by: INTERNAL MEDICINE

## 2022-01-01 PROCEDURE — 70450 CT HEAD/BRAIN W/O DYE: CPT

## 2022-01-01 PROCEDURE — C1874 STENT, COATED/COV W/DEL SYS: HCPCS | Performed by: INTERNAL MEDICINE

## 2022-01-01 PROCEDURE — 83036 HEMOGLOBIN GLYCOSYLATED A1C: CPT | Performed by: INTERNAL MEDICINE

## 2022-01-01 PROCEDURE — 84450 TRANSFERASE (AST) (SGOT): CPT | Performed by: INTERNAL MEDICINE

## 2022-01-01 PROCEDURE — C1726 CATH, BAL DIL, NON-VASCULAR: HCPCS | Performed by: INTERNAL MEDICINE

## 2022-01-01 PROCEDURE — 36620 INSERTION CATHETER ARTERY: CPT | Performed by: NURSE PRACTITIONER

## 2022-01-01 PROCEDURE — 272N000001 HC OR GENERAL SUPPLY STERILE: Performed by: INTERNAL MEDICINE

## 2022-01-01 PROCEDURE — 99254 IP/OBS CNSLTJ NEW/EST MOD 60: CPT | Performed by: PSYCHIATRY & NEUROLOGY

## 2022-01-01 PROCEDURE — 80202 ASSAY OF VANCOMYCIN: CPT | Performed by: NURSE PRACTITIONER

## 2022-01-01 PROCEDURE — 999N000208 ECHOCARDIOGRAM COMPLETE

## 2022-01-01 PROCEDURE — 96374 THER/PROPH/DIAG INJ IV PUSH: CPT | Mod: 59

## 2022-01-01 PROCEDURE — 80177 DRUG SCRN QUAN LEVETIRACETAM: CPT | Performed by: PSYCHIATRY & NEUROLOGY

## 2022-01-01 PROCEDURE — 80053 COMPREHEN METABOLIC PANEL: CPT | Performed by: INTERNAL MEDICINE

## 2022-01-01 PROCEDURE — 94002 VENT MGMT INPAT INIT DAY: CPT

## 2022-01-01 PROCEDURE — 36415 COLL VENOUS BLD VENIPUNCTURE: CPT | Performed by: INTERNAL MEDICINE

## 2022-01-01 PROCEDURE — 80048 BASIC METABOLIC PNL TOTAL CA: CPT | Performed by: INTERNAL MEDICINE

## 2022-01-01 PROCEDURE — 74018 RADEX ABDOMEN 1 VIEW: CPT

## 2022-01-01 PROCEDURE — 999N000065 XR CHEST PORT 1 VIEW

## 2022-01-01 PROCEDURE — C1753 CATH, INTRAVAS ULTRASOUND: HCPCS | Performed by: INTERNAL MEDICINE

## 2022-01-01 PROCEDURE — 255N000002 HC RX 255 OP 636: Performed by: INTERNAL MEDICINE

## 2022-01-01 PROCEDURE — 93970 EXTREMITY STUDY: CPT

## 2022-01-01 PROCEDURE — 85014 HEMATOCRIT: CPT | Performed by: EMERGENCY MEDICINE

## 2022-01-01 PROCEDURE — C9803 HOPD COVID-19 SPEC COLLECT: HCPCS

## 2022-01-01 PROCEDURE — 31725 CLEARANCE OF AIRWAYS: CPT

## 2022-01-01 PROCEDURE — 93010 ELECTROCARDIOGRAM REPORT: CPT | Mod: 76 | Performed by: INTERNAL MEDICINE

## 2022-01-01 PROCEDURE — 84999 UNLISTED CHEMISTRY PROCEDURE: CPT | Performed by: PSYCHIATRY & NEUROLOGY

## 2022-01-01 PROCEDURE — 999N000178 HC STATISTIC SUCTION SPUTUM

## 2022-01-01 PROCEDURE — 87040 BLOOD CULTURE FOR BACTERIA: CPT | Performed by: INTERNAL MEDICINE

## 2022-01-01 PROCEDURE — 85014 HEMATOCRIT: CPT | Performed by: INTERNAL MEDICINE

## 2022-01-01 PROCEDURE — 93005 ELECTROCARDIOGRAM TRACING: CPT | Mod: 59

## 2022-01-01 PROCEDURE — 87449 NOS EACH ORGANISM AG IA: CPT | Performed by: INTERNAL MEDICINE

## 2022-01-01 DEVICE — STENT CORONARY DES SYNERGY XD MR US 3.50X16MM H7493941816350: Type: IMPLANTABLE DEVICE | Status: FUNCTIONAL

## 2022-01-01 RX ORDER — VANCOMYCIN HYDROCHLORIDE 1 G/200ML
1000 INJECTION, SOLUTION INTRAVENOUS EVERY 12 HOURS
Status: DISCONTINUED | OUTPATIENT
Start: 2022-01-01 | End: 2022-01-01

## 2022-01-01 RX ORDER — ASPIRIN 300 MG/1
300 SUPPOSITORY RECTAL ONCE
Status: DISCONTINUED | OUTPATIENT
Start: 2022-01-01 | End: 2022-01-01 | Stop reason: HOSPADM

## 2022-01-01 RX ORDER — FENTANYL CITRATE 50 UG/ML
100 INJECTION, SOLUTION INTRAMUSCULAR; INTRAVENOUS ONCE
Status: COMPLETED | OUTPATIENT
Start: 2022-01-01 | End: 2022-01-01

## 2022-01-01 RX ORDER — ALBUTEROL SULFATE 0.83 MG/ML
2.5 SOLUTION RESPIRATORY (INHALATION)
Status: DISCONTINUED | OUTPATIENT
Start: 2022-01-01 | End: 2022-01-01

## 2022-01-01 RX ORDER — DOPAMINE HYDROCHLORIDE 160 MG/100ML
2-10 INJECTION, SOLUTION INTRAVENOUS CONTINUOUS
Status: DISCONTINUED | OUTPATIENT
Start: 2022-01-01 | End: 2022-01-01

## 2022-01-01 RX ORDER — LIDOCAINE HYDROCHLORIDE 10 MG/ML
INJECTION, SOLUTION EPIDURAL; INFILTRATION; INTRACAUDAL; PERINEURAL
Status: DISCONTINUED
Start: 2022-01-01 | End: 2022-01-01 | Stop reason: HOSPADM

## 2022-01-01 RX ORDER — PIPERACILLIN SODIUM, TAZOBACTAM SODIUM 3; .375 G/15ML; G/15ML
3.38 INJECTION, POWDER, LYOPHILIZED, FOR SOLUTION INTRAVENOUS ONCE
Status: COMPLETED | OUTPATIENT
Start: 2022-01-01 | End: 2022-01-01

## 2022-01-01 RX ORDER — MIDAZOLAM HCL IN 0.9 % NACL/PF 1 MG/ML
1-8 PLASTIC BAG, INJECTION (ML) INTRAVENOUS CONTINUOUS
Status: DISCONTINUED | OUTPATIENT
Start: 2022-01-01 | End: 2022-01-01

## 2022-01-01 RX ORDER — SODIUM CHLORIDE, SODIUM LACTATE, POTASSIUM CHLORIDE, CALCIUM CHLORIDE 600; 310; 30; 20 MG/100ML; MG/100ML; MG/100ML; MG/100ML
INJECTION, SOLUTION INTRAVENOUS CONTINUOUS
Status: DISCONTINUED | OUTPATIENT
Start: 2022-01-01 | End: 2022-01-01

## 2022-01-01 RX ORDER — FENTANYL CITRATE 50 UG/ML
25 INJECTION, SOLUTION INTRAMUSCULAR; INTRAVENOUS
Status: CANCELLED | OUTPATIENT
Start: 2022-01-01

## 2022-01-01 RX ORDER — DEXTROSE MONOHYDRATE 25 G/50ML
25-50 INJECTION, SOLUTION INTRAVENOUS
Status: DISCONTINUED | OUTPATIENT
Start: 2022-01-01 | End: 2022-01-01

## 2022-01-01 RX ORDER — NITROGLYCERIN 5 MG/ML
VIAL (ML) INTRAVENOUS
Status: DISCONTINUED
Start: 2022-01-01 | End: 2022-01-01 | Stop reason: HOSPADM

## 2022-01-01 RX ORDER — SODIUM CHLORIDE 9 MG/ML
INJECTION, SOLUTION INTRAVENOUS CONTINUOUS
Status: CANCELLED | OUTPATIENT
Start: 2022-01-01

## 2022-01-01 RX ORDER — ETOMIDATE 2 MG/ML
30 INJECTION INTRAVENOUS ONCE
Status: COMPLETED | OUTPATIENT
Start: 2022-01-01 | End: 2022-01-01

## 2022-01-01 RX ORDER — METOCLOPRAMIDE HYDROCHLORIDE 5 MG/ML
10 INJECTION INTRAMUSCULAR; INTRAVENOUS EVERY 8 HOURS
Status: DISPENSED | OUTPATIENT
Start: 2022-01-01 | End: 2022-01-01

## 2022-01-01 RX ORDER — MEPERIDINE HYDROCHLORIDE 25 MG/ML
25-50 INJECTION INTRAMUSCULAR; INTRAVENOUS; SUBCUTANEOUS
Status: DISPENSED | OUTPATIENT
Start: 2022-01-01 | End: 2022-01-01

## 2022-01-01 RX ORDER — HEPARIN SODIUM 10000 [USP'U]/100ML
0-5000 INJECTION, SOLUTION INTRAVENOUS CONTINUOUS
Status: DISCONTINUED | OUTPATIENT
Start: 2022-01-01 | End: 2022-01-01

## 2022-01-01 RX ORDER — NALOXONE HYDROCHLORIDE 0.4 MG/ML
0.1 INJECTION, SOLUTION INTRAMUSCULAR; INTRAVENOUS; SUBCUTANEOUS
Status: DISCONTINUED | OUTPATIENT
Start: 2022-01-01 | End: 2022-01-01 | Stop reason: HOSPADM

## 2022-01-01 RX ORDER — OXYCODONE HYDROCHLORIDE 5 MG/1
10 TABLET ORAL EVERY 4 HOURS PRN
Status: CANCELLED | OUTPATIENT
Start: 2022-01-01

## 2022-01-01 RX ORDER — NALOXONE HYDROCHLORIDE 0.4 MG/ML
0.4 INJECTION, SOLUTION INTRAMUSCULAR; INTRAVENOUS; SUBCUTANEOUS
Status: CANCELLED | OUTPATIENT
Start: 2022-01-01 | End: 2022-01-01

## 2022-01-01 RX ORDER — HYDRALAZINE HYDROCHLORIDE 20 MG/ML
10 INJECTION INTRAMUSCULAR; INTRAVENOUS EVERY 4 HOURS PRN
Status: CANCELLED | OUTPATIENT
Start: 2022-01-01

## 2022-01-01 RX ORDER — PIPERACILLIN SODIUM, TAZOBACTAM SODIUM 3; .375 G/15ML; G/15ML
3.38 INJECTION, POWDER, LYOPHILIZED, FOR SOLUTION INTRAVENOUS EVERY 8 HOURS
Status: DISCONTINUED | OUTPATIENT
Start: 2022-01-01 | End: 2022-01-01

## 2022-01-01 RX ORDER — FLUMAZENIL 0.1 MG/ML
0.2 INJECTION, SOLUTION INTRAVENOUS
Status: CANCELLED | OUTPATIENT
Start: 2022-01-01 | End: 2022-01-01

## 2022-01-01 RX ORDER — VECURONIUM BROMIDE 1 MG/ML
10 INJECTION, POWDER, LYOPHILIZED, FOR SOLUTION INTRAVENOUS ONCE
Status: COMPLETED | OUTPATIENT
Start: 2022-01-01 | End: 2022-01-01

## 2022-01-01 RX ORDER — NITROGLYCERIN 0.4 MG/1
0.4 TABLET SUBLINGUAL EVERY 5 MIN PRN
Status: CANCELLED | OUTPATIENT
Start: 2022-01-01

## 2022-01-01 RX ORDER — FENTANYL CITRATE 50 UG/ML
INJECTION, SOLUTION INTRAMUSCULAR; INTRAVENOUS
Status: DISCONTINUED
Start: 2022-01-01 | End: 2022-01-01 | Stop reason: HOSPADM

## 2022-01-01 RX ORDER — FUROSEMIDE 10 MG/ML
20 INJECTION INTRAMUSCULAR; INTRAVENOUS ONCE
Status: COMPLETED | OUTPATIENT
Start: 2022-01-01 | End: 2022-01-01

## 2022-01-01 RX ORDER — ATORVASTATIN CALCIUM 40 MG/1
80 TABLET, FILM COATED ORAL EVERY EVENING
Status: DISCONTINUED | OUTPATIENT
Start: 2022-01-01 | End: 2022-01-01

## 2022-01-01 RX ORDER — SODIUM CHLORIDE 9 MG/ML
INJECTION, SOLUTION INTRAVENOUS CONTINUOUS
Status: DISCONTINUED | OUTPATIENT
Start: 2022-01-01 | End: 2022-01-01 | Stop reason: CLARIF

## 2022-01-01 RX ORDER — POTASSIUM CHLORIDE 20MEQ/15ML
20 LIQUID (ML) ORAL ONCE
Status: COMPLETED | OUTPATIENT
Start: 2022-01-01 | End: 2022-01-01

## 2022-01-01 RX ORDER — NALOXONE HYDROCHLORIDE 0.4 MG/ML
0.2 INJECTION, SOLUTION INTRAMUSCULAR; INTRAVENOUS; SUBCUTANEOUS
Status: DISCONTINUED | OUTPATIENT
Start: 2022-01-01 | End: 2022-01-01 | Stop reason: HOSPADM

## 2022-01-01 RX ORDER — VECURONIUM BROMIDE 1 MG/ML
0.05 INJECTION, POWDER, LYOPHILIZED, FOR SOLUTION INTRAVENOUS EVERY 30 MIN PRN
Status: DISCONTINUED | OUTPATIENT
Start: 2022-01-01 | End: 2022-01-01

## 2022-01-01 RX ORDER — METOLAZONE 2.5 MG/1
2.5 TABLET ORAL ONCE
Status: COMPLETED | OUTPATIENT
Start: 2022-01-01 | End: 2022-01-01

## 2022-01-01 RX ORDER — OXYCODONE HYDROCHLORIDE 5 MG/1
5 TABLET ORAL EVERY 4 HOURS PRN
Status: CANCELLED | OUTPATIENT
Start: 2022-01-01

## 2022-01-01 RX ORDER — FUROSEMIDE 10 MG/ML
60 INJECTION INTRAMUSCULAR; INTRAVENOUS EVERY 8 HOURS
Status: DISCONTINUED | OUTPATIENT
Start: 2022-01-01 | End: 2022-01-01

## 2022-01-01 RX ORDER — ASPIRIN 81 MG/1
81 TABLET, CHEWABLE ORAL ONCE
COMMUNITY

## 2022-01-01 RX ORDER — NALOXONE HYDROCHLORIDE 0.4 MG/ML
0.2 INJECTION, SOLUTION INTRAMUSCULAR; INTRAVENOUS; SUBCUTANEOUS
Status: CANCELLED | OUTPATIENT
Start: 2022-01-01 | End: 2022-01-01

## 2022-01-01 RX ORDER — ACETAMINOPHEN 325 MG/1
650 TABLET ORAL EVERY 4 HOURS PRN
Status: CANCELLED | OUTPATIENT
Start: 2022-01-01

## 2022-01-01 RX ORDER — IBUPROFEN 200 MG
400 TABLET ORAL EVERY 6 HOURS PRN
COMMUNITY

## 2022-01-01 RX ORDER — SODIUM CHLORIDE 9 MG/ML
INJECTION, SOLUTION INTRAVENOUS CONTINUOUS
Status: CANCELLED | OUTPATIENT
Start: 2022-01-01 | End: 2022-01-01

## 2022-01-01 RX ORDER — LORAZEPAM 1 MG/1
1 TABLET ORAL
Status: DISCONTINUED | OUTPATIENT
Start: 2022-01-01 | End: 2022-01-01

## 2022-01-01 RX ORDER — VERAPAMIL HYDROCHLORIDE 2.5 MG/ML
INJECTION, SOLUTION INTRAVENOUS
Status: DISCONTINUED
Start: 2022-01-01 | End: 2022-01-01 | Stop reason: HOSPADM

## 2022-01-01 RX ORDER — FENTANYL CITRATE 50 UG/ML
50 INJECTION, SOLUTION INTRAMUSCULAR; INTRAVENOUS EVERY 30 MIN PRN
Status: DISCONTINUED | OUTPATIENT
Start: 2022-01-01 | End: 2022-01-01 | Stop reason: CLARIF

## 2022-01-01 RX ORDER — CEFAZOLIN SODIUM 1 G/50ML
1750 SOLUTION INTRAVENOUS ONCE
Status: DISCONTINUED | OUTPATIENT
Start: 2022-01-01 | End: 2022-01-01

## 2022-01-01 RX ORDER — LORAZEPAM 2 MG/ML
.5-1 INJECTION INTRAMUSCULAR EVERY 30 MIN PRN
Status: DISCONTINUED | OUTPATIENT
Start: 2022-01-01 | End: 2022-01-01 | Stop reason: HOSPADM

## 2022-01-01 RX ORDER — METOPROLOL TARTRATE 1 MG/ML
5 INJECTION, SOLUTION INTRAVENOUS EVERY 4 HOURS PRN
Status: DISCONTINUED | OUTPATIENT
Start: 2022-01-01 | End: 2022-01-01 | Stop reason: HOSPADM

## 2022-01-01 RX ORDER — IOPAMIDOL 755 MG/ML
INJECTION, SOLUTION INTRAVASCULAR
Status: DISCONTINUED | OUTPATIENT
Start: 2022-01-01 | End: 2022-01-01 | Stop reason: HOSPADM

## 2022-01-01 RX ORDER — ONDANSETRON 2 MG/ML
4 INJECTION INTRAMUSCULAR; INTRAVENOUS EVERY 6 HOURS PRN
Status: CANCELLED | OUTPATIENT
Start: 2022-01-01

## 2022-01-01 RX ORDER — ALBUMIN (HUMAN) 12.5 G/50ML
12.5 SOLUTION INTRAVENOUS ONCE
Status: COMPLETED | OUTPATIENT
Start: 2022-01-01 | End: 2022-01-01

## 2022-01-01 RX ORDER — ATROPINE SULFATE 0.1 MG/ML
INJECTION INTRAVENOUS
Status: DISCONTINUED
Start: 2022-01-01 | End: 2022-01-01 | Stop reason: WASHOUT

## 2022-01-01 RX ORDER — NITROGLYCERIN 5 MG/ML
VIAL (ML) INTRAVENOUS
Status: DISCONTINUED | OUTPATIENT
Start: 2022-01-01 | End: 2022-01-01 | Stop reason: HOSPADM

## 2022-01-01 RX ORDER — ACETAMINOPHEN 500 MG
500-1000 TABLET ORAL EVERY 6 HOURS PRN
COMMUNITY

## 2022-01-01 RX ORDER — SCOLOPAMINE TRANSDERMAL SYSTEM 1 MG/1
1 PATCH, EXTENDED RELEASE TRANSDERMAL
Status: DISCONTINUED | OUTPATIENT
Start: 2022-01-01 | End: 2022-01-01 | Stop reason: HOSPADM

## 2022-01-01 RX ORDER — POTASSIUM CHLORIDE 20MEQ/15ML
40 LIQUID (ML) ORAL ONCE
Status: COMPLETED | OUTPATIENT
Start: 2022-01-01 | End: 2022-01-01

## 2022-01-01 RX ORDER — POTASSIUM CHLORIDE 29.8 MG/ML
20 INJECTION INTRAVENOUS
Status: COMPLETED | OUTPATIENT
Start: 2022-01-01 | End: 2022-01-01

## 2022-01-01 RX ORDER — FENTANYL CITRATE 50 UG/ML
INJECTION, SOLUTION INTRAMUSCULAR; INTRAVENOUS
Status: DISCONTINUED | OUTPATIENT
Start: 2022-01-01 | End: 2022-01-01 | Stop reason: HOSPADM

## 2022-01-01 RX ORDER — FONDAPARINUX SODIUM 2.5 MG/.5ML
2.5 INJECTION SUBCUTANEOUS EVERY 24 HOURS
Status: DISCONTINUED | OUTPATIENT
Start: 2022-01-01 | End: 2022-01-01

## 2022-01-01 RX ORDER — ASPIRIN 81 MG/1
81 TABLET ORAL DAILY
Status: CANCELLED | OUTPATIENT
Start: 2022-01-01

## 2022-01-01 RX ORDER — ACETAMINOPHEN 325 MG/1
650 TABLET ORAL EVERY 4 HOURS PRN
Status: DISCONTINUED | OUTPATIENT
Start: 2022-01-01 | End: 2022-01-01 | Stop reason: HOSPADM

## 2022-01-01 RX ORDER — ROCURONIUM BROMIDE 50 MG/5 ML
100 SYRINGE (ML) INTRAVENOUS ONCE
Status: COMPLETED | OUTPATIENT
Start: 2022-01-01 | End: 2022-01-01

## 2022-01-01 RX ORDER — FUROSEMIDE 10 MG/ML
80 INJECTION INTRAMUSCULAR; INTRAVENOUS ONCE
Status: COMPLETED | OUTPATIENT
Start: 2022-01-01 | End: 2022-01-01

## 2022-01-01 RX ORDER — METOCLOPRAMIDE HYDROCHLORIDE 5 MG/ML
10 INJECTION INTRAMUSCULAR; INTRAVENOUS EVERY 8 HOURS PRN
Status: DISCONTINUED | OUTPATIENT
Start: 2022-01-01 | End: 2022-01-01

## 2022-01-01 RX ORDER — LORAZEPAM 2 MG/ML
1 INJECTION INTRAMUSCULAR
Status: DISCONTINUED | OUTPATIENT
Start: 2022-01-01 | End: 2022-01-01

## 2022-01-01 RX ORDER — SODIUM CHLORIDE 9 MG/ML
INJECTION, SOLUTION INTRAVENOUS CONTINUOUS
Status: DISCONTINUED | OUTPATIENT
Start: 2022-01-01 | End: 2022-01-01

## 2022-01-01 RX ORDER — ASPIRIN 81 MG/1
81 TABLET, CHEWABLE ORAL DAILY
Status: DISCONTINUED | OUTPATIENT
Start: 2022-01-01 | End: 2022-01-01

## 2022-01-01 RX ORDER — NALOXONE HYDROCHLORIDE 0.4 MG/ML
0.4 INJECTION, SOLUTION INTRAMUSCULAR; INTRAVENOUS; SUBCUTANEOUS
Status: DISCONTINUED | OUTPATIENT
Start: 2022-01-01 | End: 2022-01-01 | Stop reason: HOSPADM

## 2022-01-01 RX ORDER — ONDANSETRON 2 MG/ML
4 INJECTION INTRAMUSCULAR; INTRAVENOUS EVERY 6 HOURS PRN
Status: DISCONTINUED | OUTPATIENT
Start: 2022-01-01 | End: 2022-01-01 | Stop reason: HOSPADM

## 2022-01-01 RX ORDER — ACETAMINOPHEN 650 MG/1
650 SUPPOSITORY RECTAL EVERY 4 HOURS PRN
Status: DISCONTINUED | OUTPATIENT
Start: 2022-01-01 | End: 2022-01-01 | Stop reason: HOSPADM

## 2022-01-01 RX ORDER — NOREPINEPHRINE BITARTRATE 0.02 MG/ML
.01-.6 INJECTION, SOLUTION INTRAVENOUS CONTINUOUS
Status: DISCONTINUED | OUTPATIENT
Start: 2022-01-01 | End: 2022-01-01

## 2022-01-01 RX ORDER — MAGNESIUM SULFATE 4 G/50ML
4 INJECTION INTRAVENOUS ONCE
Status: COMPLETED | OUTPATIENT
Start: 2022-01-01 | End: 2022-01-01

## 2022-01-01 RX ORDER — FENTANYL CITRATE 50 UG/ML
INJECTION, SOLUTION INTRAMUSCULAR; INTRAVENOUS
Status: COMPLETED
Start: 2022-01-01 | End: 2022-01-01

## 2022-01-01 RX ORDER — POTASSIUM CHLORIDE 7.45 MG/ML
INJECTION INTRAVENOUS CONTINUOUS PRN
Status: DISCONTINUED | OUTPATIENT
Start: 2022-01-01 | End: 2022-01-01 | Stop reason: HOSPADM

## 2022-01-01 RX ORDER — AMINO AC/PROTEIN HYDR/WHEY PRO 10G-100/30
1 LIQUID (ML) ORAL 2 TIMES DAILY
Status: DISCONTINUED | OUTPATIENT
Start: 2022-01-01 | End: 2022-01-01

## 2022-01-01 RX ORDER — PROPOFOL 10 MG/ML
INJECTION, EMULSION INTRAVENOUS
Status: COMPLETED
Start: 2022-01-01 | End: 2022-01-01

## 2022-01-01 RX ORDER — HEPARIN SODIUM 1000 [USP'U]/ML
INJECTION, SOLUTION INTRAVENOUS; SUBCUTANEOUS
Status: DISCONTINUED
Start: 2022-01-01 | End: 2022-01-01 | Stop reason: HOSPADM

## 2022-01-01 RX ORDER — METOPROLOL TARTRATE 1 MG/ML
5 INJECTION, SOLUTION INTRAVENOUS
Status: CANCELLED | OUTPATIENT
Start: 2022-01-01

## 2022-01-01 RX ORDER — DEXTROSE MONOHYDRATE 100 MG/ML
INJECTION, SOLUTION INTRAVENOUS CONTINUOUS PRN
Status: DISCONTINUED | OUTPATIENT
Start: 2022-01-01 | End: 2022-01-01 | Stop reason: HOSPADM

## 2022-01-01 RX ORDER — ATROPINE SULFATE 0.1 MG/ML
0.5 INJECTION INTRAVENOUS
Status: CANCELLED | OUTPATIENT
Start: 2022-01-01 | End: 2022-01-01

## 2022-01-01 RX ORDER — LIDOCAINE 40 MG/G
CREAM TOPICAL
Status: ACTIVE | OUTPATIENT
Start: 2022-01-01 | End: 2022-01-01

## 2022-01-01 RX ORDER — GLYCOPYRROLATE 0.2 MG/ML
0.2 INJECTION, SOLUTION INTRAMUSCULAR; INTRAVENOUS EVERY 4 HOURS PRN
Status: DISCONTINUED | OUTPATIENT
Start: 2022-01-01 | End: 2022-01-01 | Stop reason: HOSPADM

## 2022-01-01 RX ORDER — POTASSIUM CHLORIDE 7.45 MG/ML
10 INJECTION INTRAVENOUS
Status: DISPENSED | OUTPATIENT
Start: 2022-01-01 | End: 2022-01-01

## 2022-01-01 RX ORDER — DEXTROSE MONOHYDRATE 25 G/50ML
25-50 INJECTION, SOLUTION INTRAVENOUS
Status: DISCONTINUED | OUTPATIENT
Start: 2022-01-01 | End: 2022-01-01 | Stop reason: HOSPADM

## 2022-01-01 RX ORDER — ONDANSETRON 4 MG/1
4 TABLET, ORALLY DISINTEGRATING ORAL EVERY 6 HOURS PRN
Status: CANCELLED | OUTPATIENT
Start: 2022-01-01

## 2022-01-01 RX ORDER — ATORVASTATIN CALCIUM 80 MG/1
80 TABLET, FILM COATED ORAL DAILY
Qty: 90 TABLET | Refills: 3 | Status: CANCELLED | OUTPATIENT
Start: 2022-01-01

## 2022-01-01 RX ORDER — CEFAZOLIN SODIUM 1 G/50ML
1750 SOLUTION INTRAVENOUS ONCE
Status: COMPLETED | OUTPATIENT
Start: 2022-01-01 | End: 2022-01-01

## 2022-01-01 RX ORDER — ATORVASTATIN CALCIUM 40 MG/1
80 TABLET, FILM COATED ORAL DAILY
Status: CANCELLED | OUTPATIENT
Start: 2022-01-01

## 2022-01-01 RX ORDER — MORPHINE SULFATE 2 MG/ML
2-4 INJECTION, SOLUTION INTRAMUSCULAR; INTRAVENOUS
Status: DISCONTINUED | OUTPATIENT
Start: 2022-01-01 | End: 2022-01-01 | Stop reason: HOSPADM

## 2022-01-01 RX ORDER — NICOTINE POLACRILEX 4 MG
15-30 LOZENGE BUCCAL
Status: DISCONTINUED | OUTPATIENT
Start: 2022-01-01 | End: 2022-01-01 | Stop reason: HOSPADM

## 2022-01-01 RX ORDER — DEXTROSE MONOHYDRATE 100 MG/ML
INJECTION, SOLUTION INTRAVENOUS CONTINUOUS PRN
Status: DISCONTINUED | OUTPATIENT
Start: 2022-01-01 | End: 2022-01-01 | Stop reason: CLARIF

## 2022-01-01 RX ORDER — ACETAMINOPHEN 650 MG/1
650 SUPPOSITORY RECTAL EVERY 6 HOURS PRN
Status: DISCONTINUED | OUTPATIENT
Start: 2022-01-01 | End: 2022-01-01

## 2022-01-01 RX ORDER — ASPIRIN 81 MG/1
81 TABLET, CHEWABLE ORAL ONCE
Status: CANCELLED | OUTPATIENT
Start: 2022-01-01 | End: 2022-01-01

## 2022-01-01 RX ORDER — FUROSEMIDE 10 MG/ML
20 INJECTION INTRAMUSCULAR; INTRAVENOUS EVERY 8 HOURS
Status: DISCONTINUED | OUTPATIENT
Start: 2022-01-01 | End: 2022-01-01

## 2022-01-01 RX ORDER — PROPOFOL 10 MG/ML
5-75 INJECTION, EMULSION INTRAVENOUS CONTINUOUS
Status: DISCONTINUED | OUTPATIENT
Start: 2022-01-01 | End: 2022-01-01

## 2022-01-01 RX ORDER — FENTANYL CITRATE-0.9 % NACL/PF 10 MCG/ML
PLASTIC BAG, INJECTION (ML) INTRAVENOUS
Status: DISCONTINUED
Start: 2022-01-01 | End: 2022-01-01 | Stop reason: WASHOUT

## 2022-01-01 RX ORDER — CARVEDILOL 12.5 MG/1
12.5 TABLET ORAL 2 TIMES DAILY
Status: DISCONTINUED | OUTPATIENT
Start: 2022-01-01 | End: 2022-01-01

## 2022-01-01 RX ORDER — ASPIRIN 300 MG/1
300 SUPPOSITORY RECTAL DAILY
Status: DISCONTINUED | OUTPATIENT
Start: 2022-01-01 | End: 2022-01-01

## 2022-01-01 RX ORDER — HEPARIN SODIUM 1000 [USP'U]/ML
INJECTION, SOLUTION INTRAVENOUS; SUBCUTANEOUS
Status: DISCONTINUED | OUTPATIENT
Start: 2022-01-01 | End: 2022-01-01 | Stop reason: HOSPADM

## 2022-01-01 RX ORDER — METHYLPREDNISOLONE SODIUM SUCCINATE 125 MG/2ML
60 INJECTION, POWDER, LYOPHILIZED, FOR SOLUTION INTRAMUSCULAR; INTRAVENOUS ONCE
Status: COMPLETED | OUTPATIENT
Start: 2022-01-01 | End: 2022-01-01

## 2022-01-01 RX ORDER — NICOTINE POLACRILEX 4 MG
15-30 LOZENGE BUCCAL
Status: DISCONTINUED | OUTPATIENT
Start: 2022-01-01 | End: 2022-01-01

## 2022-01-01 RX ORDER — VECURONIUM BROMIDE 1 MG/ML
10 INJECTION, POWDER, LYOPHILIZED, FOR SOLUTION INTRAVENOUS
Status: COMPLETED | OUTPATIENT
Start: 2022-01-01 | End: 2022-01-01

## 2022-01-01 RX ORDER — MORPHINE SULFATE 2 MG/ML
1-2 INJECTION, SOLUTION INTRAMUSCULAR; INTRAVENOUS
Status: DISCONTINUED | OUTPATIENT
Start: 2022-01-01 | End: 2022-01-01

## 2022-01-01 RX ORDER — CHLORHEXIDINE GLUCONATE ORAL RINSE 1.2 MG/ML
15 SOLUTION DENTAL EVERY 12 HOURS
Status: DISCONTINUED | OUTPATIENT
Start: 2022-01-01 | End: 2022-01-01 | Stop reason: HOSPADM

## 2022-01-01 RX ADMIN — TICAGRELOR 90 MG: 90 TABLET ORAL at 09:43

## 2022-01-01 RX ADMIN — MORPHINE SULFATE 4 MG: 2 INJECTION, SOLUTION INTRAMUSCULAR; INTRAVENOUS at 12:32

## 2022-01-01 RX ADMIN — PANTOPRAZOLE SODIUM 40 MG: 40 INJECTION, POWDER, FOR SOLUTION INTRAVENOUS at 06:48

## 2022-01-01 RX ADMIN — FUROSEMIDE 15 MG/HR: 10 INJECTION, SOLUTION INTRAVENOUS at 23:45

## 2022-01-01 RX ADMIN — ACETAMINOPHEN 650 MG: 650 SUPPOSITORY RECTAL at 15:02

## 2022-01-01 RX ADMIN — PROPOFOL 60 MCG/KG/MIN: 10 INJECTION, EMULSION INTRAVENOUS at 18:38

## 2022-01-01 RX ADMIN — MINERAL OIL AND WHITE PETROLATUM 1 INCH: 150; 830 OINTMENT OPHTHALMIC at 10:32

## 2022-01-01 RX ADMIN — Medication 0.36 MCG/KG/MIN: at 06:56

## 2022-01-01 RX ADMIN — MIDAZOLAM 2 MG: 1 INJECTION INTRAMUSCULAR; INTRAVENOUS at 12:06

## 2022-01-01 RX ADMIN — SODIUM BICARBONATE 50 MEQ: 84 INJECTION, SOLUTION INTRAVENOUS at 12:30

## 2022-01-01 RX ADMIN — INSULIN ASPART 1 UNITS: 100 INJECTION, SOLUTION INTRAVENOUS; SUBCUTANEOUS at 12:04

## 2022-01-01 RX ADMIN — CANGRELOR 4 MCG/KG/MIN: 50 INJECTION, POWDER, LYOPHILIZED, FOR SOLUTION INTRAVENOUS at 17:09

## 2022-01-01 RX ADMIN — MINERAL OIL AND WHITE PETROLATUM 2 INCH: 150; 830 OINTMENT OPHTHALMIC at 19:44

## 2022-01-01 RX ADMIN — PROPOFOL 10 MCG/KG/MIN: 10 INJECTION, EMULSION INTRAVENOUS at 12:22

## 2022-01-01 RX ADMIN — VANCOMYCIN HYDROCHLORIDE 1750 MG: 5 INJECTION, POWDER, LYOPHILIZED, FOR SOLUTION INTRAVENOUS at 14:27

## 2022-01-01 RX ADMIN — SODIUM CHLORIDE, POTASSIUM CHLORIDE, SODIUM LACTATE AND CALCIUM CHLORIDE: 600; 310; 30; 20 INJECTION, SOLUTION INTRAVENOUS at 21:53

## 2022-01-01 RX ADMIN — SODIUM CHLORIDE, POTASSIUM CHLORIDE, SODIUM LACTATE AND CALCIUM CHLORIDE 500 ML: 600; 310; 30; 20 INJECTION, SOLUTION INTRAVENOUS at 23:26

## 2022-01-01 RX ADMIN — LEVETIRACETAM 1000 MG: 100 INJECTION, SOLUTION INTRAVENOUS at 19:58

## 2022-01-01 RX ADMIN — Medication 1 PACKET: at 20:10

## 2022-01-01 RX ADMIN — POTASSIUM CHLORIDE 20 MEQ: 29.8 INJECTION, SOLUTION INTRAVENOUS at 06:00

## 2022-01-01 RX ADMIN — POTASSIUM CHLORIDE 20 MEQ: 29.8 INJECTION, SOLUTION INTRAVENOUS at 06:03

## 2022-01-01 RX ADMIN — PROPOFOL 20 MCG/KG/MIN: 10 INJECTION, EMULSION INTRAVENOUS at 01:18

## 2022-01-01 RX ADMIN — MORPHINE SULFATE 2 MG: 2 INJECTION, SOLUTION INTRAMUSCULAR; INTRAVENOUS at 20:10

## 2022-01-01 RX ADMIN — PIPERACILLIN SODIUM AND TAZOBACTAM SODIUM 3.38 G: 3; .375 INJECTION, POWDER, LYOPHILIZED, FOR SOLUTION INTRAVENOUS at 18:17

## 2022-01-01 RX ADMIN — MORPHINE SULFATE 4 MG: 2 INJECTION, SOLUTION INTRAMUSCULAR; INTRAVENOUS at 22:33

## 2022-01-01 RX ADMIN — PROPOFOL 70 MCG/KG/MIN: 10 INJECTION, EMULSION INTRAVENOUS at 12:30

## 2022-01-01 RX ADMIN — PIPERACILLIN SODIUM AND TAZOBACTAM SODIUM 3.38 G: 3; .375 INJECTION, POWDER, LYOPHILIZED, FOR SOLUTION INTRAVENOUS at 02:58

## 2022-01-01 RX ADMIN — PROPOFOL 55 MCG/KG/MIN: 10 INJECTION, EMULSION INTRAVENOUS at 19:27

## 2022-01-01 RX ADMIN — LORAZEPAM 1 MG: 2 INJECTION INTRAMUSCULAR; INTRAVENOUS at 10:52

## 2022-01-01 RX ADMIN — MINERAL OIL AND WHITE PETROLATUM: 150; 830 OINTMENT OPHTHALMIC at 10:36

## 2022-01-01 RX ADMIN — CANGRELOR 4 MCG/KG/MIN: 50 INJECTION, POWDER, LYOPHILIZED, FOR SOLUTION INTRAVENOUS at 02:28

## 2022-01-01 RX ADMIN — FUROSEMIDE 15 MG/HR: 10 INJECTION, SOLUTION INTRAVENOUS at 02:31

## 2022-01-01 RX ADMIN — Medication 1 PACKET: at 20:04

## 2022-01-01 RX ADMIN — VECURONIUM BROMIDE 10 MG: 1 INJECTION, POWDER, LYOPHILIZED, FOR SOLUTION INTRAVENOUS at 17:28

## 2022-01-01 RX ADMIN — INSULIN ASPART 1 UNITS: 100 INJECTION, SOLUTION INTRAVENOUS; SUBCUTANEOUS at 08:13

## 2022-01-01 RX ADMIN — MIDAZOLAM HYDROCHLORIDE 1 MG/HR: 1 INJECTION, SOLUTION INTRAVENOUS at 19:30

## 2022-01-01 RX ADMIN — LEVETIRACETAM 1000 MG: 100 INJECTION, SOLUTION INTRAVENOUS at 08:28

## 2022-01-01 RX ADMIN — MORPHINE SULFATE 4 MG: 2 INJECTION, SOLUTION INTRAMUSCULAR; INTRAVENOUS at 04:29

## 2022-01-01 RX ADMIN — MINERAL OIL AND WHITE PETROLATUM: 150; 830 OINTMENT OPHTHALMIC at 03:24

## 2022-01-01 RX ADMIN — ACETAMINOPHEN 650 MG: 650 SUPPOSITORY RECTAL at 05:44

## 2022-01-01 RX ADMIN — LEVETIRACETAM 1000 MG: 100 INJECTION, SOLUTION INTRAVENOUS at 09:43

## 2022-01-01 RX ADMIN — ATORVASTATIN CALCIUM 80 MG: 40 TABLET, FILM COATED ORAL at 22:58

## 2022-01-01 RX ADMIN — TICAGRELOR 90 MG: 90 TABLET ORAL at 08:12

## 2022-01-01 RX ADMIN — MINERAL OIL AND WHITE PETROLATUM: 150; 830 OINTMENT OPHTHALMIC at 03:08

## 2022-01-01 RX ADMIN — FONDAPARINUX SODIUM 2.5 MG: 2.5 INJECTION SUBCUTANEOUS at 16:50

## 2022-01-01 RX ADMIN — SODIUM CHLORIDE: 9 INJECTION, SOLUTION INTRAVENOUS at 13:25

## 2022-01-01 RX ADMIN — ALBUMIN HUMAN 12.5 G: 0.25 SOLUTION INTRAVENOUS at 23:27

## 2022-01-01 RX ADMIN — POTASSIUM CHLORIDE 20 MEQ: 29.8 INJECTION, SOLUTION INTRAVENOUS at 20:33

## 2022-01-01 RX ADMIN — ASPIRIN 81 MG CHEWABLE TABLET 81 MG: 81 TABLET CHEWABLE at 08:53

## 2022-01-01 RX ADMIN — PROPOFOL 60 MCG/KG/MIN: 10 INJECTION, EMULSION INTRAVENOUS at 07:16

## 2022-01-01 RX ADMIN — PANTOPRAZOLE SODIUM 40 MG: 40 INJECTION, POWDER, FOR SOLUTION INTRAVENOUS at 08:13

## 2022-01-01 RX ADMIN — MINERAL OIL AND WHITE PETROLATUM 2 G: 150; 830 OINTMENT OPHTHALMIC at 03:02

## 2022-01-01 RX ADMIN — CHLORHEXIDINE GLUCONATE 0.12% ORAL RINSE 15 ML: 1.2 LIQUID ORAL at 08:13

## 2022-01-01 RX ADMIN — PROPOFOL 10 MCG/KG/MIN: 10 INJECTION, EMULSION INTRAVENOUS at 09:40

## 2022-01-01 RX ADMIN — LEVETIRACETAM 1000 MG: 100 INJECTION, SOLUTION INTRAVENOUS at 20:02

## 2022-01-01 RX ADMIN — MIDAZOLAM 2 MG: 1 INJECTION INTRAMUSCULAR; INTRAVENOUS at 14:10

## 2022-01-01 RX ADMIN — PIPERACILLIN SODIUM AND TAZOBACTAM SODIUM 3.38 G: 3; .375 INJECTION, POWDER, LYOPHILIZED, FOR SOLUTION INTRAVENOUS at 02:37

## 2022-01-01 RX ADMIN — Medication 1 PACKET: at 09:44

## 2022-01-01 RX ADMIN — LORAZEPAM 1 MG: 2 INJECTION INTRAMUSCULAR; INTRAVENOUS at 13:35

## 2022-01-01 RX ADMIN — Medication 50 MCG: at 10:42

## 2022-01-01 RX ADMIN — CANGRELOR 4 MCG/KG/MIN: 50 INJECTION, POWDER, LYOPHILIZED, FOR SOLUTION INTRAVENOUS at 12:26

## 2022-01-01 RX ADMIN — POTASSIUM CHLORIDE 20 MEQ: 29.8 INJECTION, SOLUTION INTRAVENOUS at 21:51

## 2022-01-01 RX ADMIN — METOCLOPRAMIDE HYDROCHLORIDE 10 MG: 5 INJECTION INTRAMUSCULAR; INTRAVENOUS at 14:19

## 2022-01-01 RX ADMIN — CHLORHEXIDINE GLUCONATE 0.12% ORAL RINSE 15 ML: 1.2 LIQUID ORAL at 19:43

## 2022-01-01 RX ADMIN — FUROSEMIDE 20 MG: 10 INJECTION, SOLUTION INTRAMUSCULAR; INTRAVENOUS at 18:24

## 2022-01-01 RX ADMIN — PIPERACILLIN SODIUM AND TAZOBACTAM SODIUM 3.38 G: 3; .375 INJECTION, POWDER, LYOPHILIZED, FOR SOLUTION INTRAVENOUS at 09:42

## 2022-01-01 RX ADMIN — FUROSEMIDE 80 MG: 10 INJECTION, SOLUTION INTRAVENOUS at 09:50

## 2022-01-01 RX ADMIN — ALBUTEROL SULFATE 2.5 MG: 2.5 SOLUTION RESPIRATORY (INHALATION) at 19:13

## 2022-01-01 RX ADMIN — CHLORHEXIDINE GLUCONATE 0.12% ORAL RINSE 15 ML: 1.2 LIQUID ORAL at 09:07

## 2022-01-01 RX ADMIN — LORAZEPAM 1 MG: 1 TABLET ORAL at 18:43

## 2022-01-01 RX ADMIN — SODIUM CHLORIDE: 9 INJECTION, SOLUTION INTRAVENOUS at 02:53

## 2022-01-01 RX ADMIN — Medication 50 MCG: at 09:16

## 2022-01-01 RX ADMIN — INSULIN HUMAN 2.5 UNITS/HR: 1 INJECTION, SOLUTION INTRAVENOUS at 23:00

## 2022-01-01 RX ADMIN — HEPARIN SODIUM 1800 UNITS/HR: 1000 INJECTION INTRAVENOUS; SUBCUTANEOUS at 18:35

## 2022-01-01 RX ADMIN — CANGRELOR 4 MCG/KG/MIN: 50 INJECTION, POWDER, LYOPHILIZED, FOR SOLUTION INTRAVENOUS at 14:18

## 2022-01-01 RX ADMIN — Medication 50 MCG: at 18:21

## 2022-01-01 RX ADMIN — POTASSIUM CHLORIDE 10 MEQ: 7.46 INJECTION, SOLUTION INTRAVENOUS at 16:46

## 2022-01-01 RX ADMIN — PIPERACILLIN SODIUM AND TAZOBACTAM SODIUM 3.38 G: 3; .375 INJECTION, POWDER, LYOPHILIZED, FOR SOLUTION INTRAVENOUS at 01:36

## 2022-01-01 RX ADMIN — VECURONIUM BROMIDE 10 MG: 1 INJECTION, POWDER, LYOPHILIZED, FOR SOLUTION INTRAVENOUS at 10:42

## 2022-01-01 RX ADMIN — CANGRELOR 4 MCG/KG/MIN: 50 INJECTION, POWDER, LYOPHILIZED, FOR SOLUTION INTRAVENOUS at 10:03

## 2022-01-01 RX ADMIN — FUROSEMIDE 15 MG/HR: 10 INJECTION, SOLUTION INTRAVENOUS at 08:05

## 2022-01-01 RX ADMIN — PIPERACILLIN AND TAZOBACTAM 3.38 G: 3; .375 INJECTION, POWDER, FOR SOLUTION INTRAVENOUS at 21:36

## 2022-01-01 RX ADMIN — CHLORHEXIDINE GLUCONATE 0.12% ORAL RINSE 15 ML: 1.2 LIQUID ORAL at 08:39

## 2022-01-01 RX ADMIN — PROPOFOL 55 MCG/KG/MIN: 10 INJECTION, EMULSION INTRAVENOUS at 07:30

## 2022-01-01 RX ADMIN — MORPHINE SULFATE 4 MG: 2 INJECTION, SOLUTION INTRAMUSCULAR; INTRAVENOUS at 13:35

## 2022-01-01 RX ADMIN — FENTANYL CITRATE 125 MCG/HR: 50 INJECTION, SOLUTION INTRAMUSCULAR; INTRAVENOUS at 07:04

## 2022-01-01 RX ADMIN — Medication 30 MG: at 13:58

## 2022-01-01 RX ADMIN — PIPERACILLIN SODIUM AND TAZOBACTAM SODIUM 3.38 G: 3; .375 INJECTION, POWDER, LYOPHILIZED, FOR SOLUTION INTRAVENOUS at 02:09

## 2022-01-01 RX ADMIN — ALBUTEROL SULFATE 2.5 MG: 2.5 SOLUTION RESPIRATORY (INHALATION) at 19:58

## 2022-01-01 RX ADMIN — SODIUM CHLORIDE, POTASSIUM CHLORIDE, SODIUM LACTATE AND CALCIUM CHLORIDE: 600; 310; 30; 20 INJECTION, SOLUTION INTRAVENOUS at 23:57

## 2022-01-01 RX ADMIN — PROPOFOL 40 MCG/KG/MIN: 10 INJECTION, EMULSION INTRAVENOUS at 09:44

## 2022-01-01 RX ADMIN — Medication 0.15 MCG/KG/MIN: at 03:30

## 2022-01-01 RX ADMIN — SODIUM BICARBONATE 100 MEQ: 84 INJECTION, SOLUTION INTRAVENOUS at 05:56

## 2022-01-01 RX ADMIN — Medication 1 PACKET: at 20:45

## 2022-01-01 RX ADMIN — Medication 50 MCG: at 05:40

## 2022-01-01 RX ADMIN — Medication 50 MCG: at 04:40

## 2022-01-01 RX ADMIN — PROPOFOL 50 MCG/KG/MIN: 10 INJECTION, EMULSION INTRAVENOUS at 13:51

## 2022-01-01 RX ADMIN — PIPERACILLIN SODIUM AND TAZOBACTAM SODIUM 3.38 G: 3; .375 INJECTION, POWDER, LYOPHILIZED, FOR SOLUTION INTRAVENOUS at 18:21

## 2022-01-01 RX ADMIN — PROPOFOL 60 MCG/KG/MIN: 10 INJECTION, EMULSION INTRAVENOUS at 14:29

## 2022-01-01 RX ADMIN — PIPERACILLIN SODIUM AND TAZOBACTAM SODIUM 3.38 G: 3; .375 INJECTION, POWDER, LYOPHILIZED, FOR SOLUTION INTRAVENOUS at 01:39

## 2022-01-01 RX ADMIN — AMIODARONE HYDROCHLORIDE 0.5 MG/MIN: 50 INJECTION, SOLUTION INTRAVENOUS at 06:15

## 2022-01-01 RX ADMIN — MINERAL OIL AND WHITE PETROLATUM: 150; 830 OINTMENT OPHTHALMIC at 20:51

## 2022-01-01 RX ADMIN — VANCOMYCIN HYDROCHLORIDE 1000 MG: 1 INJECTION, SOLUTION INTRAVENOUS at 01:27

## 2022-01-01 RX ADMIN — LORAZEPAM 1 MG: 2 INJECTION INTRAMUSCULAR; INTRAVENOUS at 01:47

## 2022-01-01 RX ADMIN — CARVEDILOL 12.5 MG: 12.5 TABLET, FILM COATED ORAL at 08:13

## 2022-01-01 RX ADMIN — MEPERIDINE HYDROCHLORIDE 25 MG: 25 INJECTION INTRAMUSCULAR; INTRAVENOUS; SUBCUTANEOUS at 05:29

## 2022-01-01 RX ADMIN — INSULIN ASPART 1 UNITS: 100 INJECTION, SOLUTION INTRAVENOUS; SUBCUTANEOUS at 20:21

## 2022-01-01 RX ADMIN — Medication 0.12 MCG/KG/MIN: at 04:42

## 2022-01-01 RX ADMIN — PROPOFOL 35 MCG/KG/MIN: 10 INJECTION, EMULSION INTRAVENOUS at 22:23

## 2022-01-01 RX ADMIN — MIDAZOLAM 2 MG: 1 INJECTION INTRAMUSCULAR; INTRAVENOUS at 03:47

## 2022-01-01 RX ADMIN — PROPOFOL 55 MCG/KG/MIN: 10 INJECTION, EMULSION INTRAVENOUS at 23:31

## 2022-01-01 RX ADMIN — CANGRELOR 4 MCG/KG/MIN: 50 INJECTION, POWDER, LYOPHILIZED, FOR SOLUTION INTRAVENOUS at 21:46

## 2022-01-01 RX ADMIN — CHLORHEXIDINE GLUCONATE 0.12% ORAL RINSE 15 ML: 1.2 LIQUID ORAL at 08:53

## 2022-01-01 RX ADMIN — MORPHINE SULFATE 4 MG: 2 INJECTION, SOLUTION INTRAMUSCULAR; INTRAVENOUS at 07:07

## 2022-01-01 RX ADMIN — CHLORHEXIDINE GLUCONATE 0.12% ORAL RINSE 15 ML: 1.2 LIQUID ORAL at 20:33

## 2022-01-01 RX ADMIN — MINERAL OIL AND WHITE PETROLATUM: 150; 830 OINTMENT OPHTHALMIC at 02:54

## 2022-01-01 RX ADMIN — VASOPRESSIN 2.4 UNITS/HR: 20 INJECTION INTRAVENOUS at 04:46

## 2022-01-01 RX ADMIN — LIDOCAINE HYDROCHLORIDE 3 ML: 10 INJECTION, SOLUTION EPIDURAL; INFILTRATION; INTRACAUDAL; PERINEURAL at 13:30

## 2022-01-01 RX ADMIN — CHLORHEXIDINE GLUCONATE 0.12% ORAL RINSE 15 ML: 1.2 LIQUID ORAL at 20:36

## 2022-01-01 RX ADMIN — ACETAMINOPHEN 650 MG: 650 SUPPOSITORY RECTAL at 08:21

## 2022-01-01 RX ADMIN — MIDAZOLAM 3 MG: 1 INJECTION INTRAMUSCULAR; INTRAVENOUS at 11:07

## 2022-01-01 RX ADMIN — POTASSIUM CHLORIDE 20 MEQ: 29.8 INJECTION, SOLUTION INTRAVENOUS at 06:26

## 2022-01-01 RX ADMIN — MINERAL OIL AND WHITE PETROLATUM: 150; 830 OINTMENT OPHTHALMIC at 20:24

## 2022-01-01 RX ADMIN — CHLORHEXIDINE GLUCONATE 0.12% ORAL RINSE 15 ML: 1.2 LIQUID ORAL at 20:26

## 2022-01-01 RX ADMIN — MORPHINE SULFATE 2 MG: 2 INJECTION, SOLUTION INTRAMUSCULAR; INTRAVENOUS at 10:48

## 2022-01-01 RX ADMIN — HEPARIN SODIUM 1800 UNITS/HR: 1000 INJECTION INTRAVENOUS; SUBCUTANEOUS at 09:44

## 2022-01-01 RX ADMIN — ROCURONIUM BROMIDE 100 MG: 50 INJECTION, SOLUTION INTRAVENOUS at 13:45

## 2022-01-01 RX ADMIN — LEVETIRACETAM 1000 MG: 100 INJECTION, SOLUTION INTRAVENOUS at 20:07

## 2022-01-01 RX ADMIN — MIDAZOLAM 2 MG: 1 INJECTION INTRAMUSCULAR; INTRAVENOUS at 05:39

## 2022-01-01 RX ADMIN — Medication 50 MCG: at 14:15

## 2022-01-01 RX ADMIN — MINERAL OIL AND WHITE PETROLATUM 1 INCH: 150; 830 OINTMENT OPHTHALMIC at 18:14

## 2022-01-01 RX ADMIN — CHLORHEXIDINE GLUCONATE 0.12% ORAL RINSE 15 ML: 1.2 LIQUID ORAL at 07:55

## 2022-01-01 RX ADMIN — MEPERIDINE HYDROCHLORIDE 25 MG: 25 INJECTION INTRAMUSCULAR; INTRAVENOUS; SUBCUTANEOUS at 08:56

## 2022-01-01 RX ADMIN — Medication 50 MCG: at 05:37

## 2022-01-01 RX ADMIN — PROPOFOL 25 MCG/KG/MIN: 10 INJECTION, EMULSION INTRAVENOUS at 22:51

## 2022-01-01 RX ADMIN — MINERAL OIL AND WHITE PETROLATUM: 150; 830 OINTMENT OPHTHALMIC at 11:59

## 2022-01-01 RX ADMIN — SODIUM CHLORIDE 1000 ML: 9 INJECTION, SOLUTION INTRAVENOUS at 02:00

## 2022-01-01 RX ADMIN — Medication 50 MCG: at 16:27

## 2022-01-01 RX ADMIN — TICAGRELOR 90 MG: 90 TABLET ORAL at 20:04

## 2022-01-01 RX ADMIN — CARVEDILOL 12.5 MG: 12.5 TABLET, FILM COATED ORAL at 12:35

## 2022-01-01 RX ADMIN — TICAGRELOR 90 MG: 90 TABLET ORAL at 08:15

## 2022-01-01 RX ADMIN — VASOPRESSIN 2.4 UNITS/HR: 20 INJECTION INTRAVENOUS at 11:36

## 2022-01-01 RX ADMIN — FUROSEMIDE 5 MG/HR: 10 INJECTION, SOLUTION INTRAVENOUS at 09:27

## 2022-01-01 RX ADMIN — Medication 50 MCG: at 08:07

## 2022-01-01 RX ADMIN — VASOPRESSIN 2.4 UNITS/HR: 20 INJECTION INTRAVENOUS at 20:13

## 2022-01-01 RX ADMIN — CANGRELOR 4 MCG/KG/MIN: 50 INJECTION, POWDER, LYOPHILIZED, FOR SOLUTION INTRAVENOUS at 17:21

## 2022-01-01 RX ADMIN — FUROSEMIDE 60 MG: 10 INJECTION, SOLUTION INTRAMUSCULAR; INTRAVENOUS at 20:11

## 2022-01-01 RX ADMIN — PROPOFOL 60 MCG/KG/MIN: 10 INJECTION, EMULSION INTRAVENOUS at 19:38

## 2022-01-01 RX ADMIN — Medication 50 MCG: at 02:39

## 2022-01-01 RX ADMIN — Medication 50 MCG: at 23:29

## 2022-01-01 RX ADMIN — MORPHINE SULFATE 2 MG: 2 INJECTION, SOLUTION INTRAMUSCULAR; INTRAVENOUS at 18:26

## 2022-01-01 RX ADMIN — Medication 1 PACKET: at 08:18

## 2022-01-01 RX ADMIN — PANTOPRAZOLE SODIUM 40 MG: 40 INJECTION, POWDER, FOR SOLUTION INTRAVENOUS at 06:42

## 2022-01-01 RX ADMIN — INSULIN ASPART 1 UNITS: 100 INJECTION, SOLUTION INTRAVENOUS; SUBCUTANEOUS at 20:41

## 2022-01-01 RX ADMIN — ACETAMINOPHEN 650 MG: 650 SUPPOSITORY RECTAL at 08:52

## 2022-01-01 RX ADMIN — FENTANYL CITRATE 25 MCG/HR: 50 INJECTION, SOLUTION INTRAMUSCULAR; INTRAVENOUS at 19:30

## 2022-01-01 RX ADMIN — PROPOFOL 60 MCG/KG/MIN: 10 INJECTION, EMULSION INTRAVENOUS at 00:00

## 2022-01-01 RX ADMIN — TICAGRELOR 90 MG: 90 TABLET ORAL at 12:44

## 2022-01-01 RX ADMIN — INSULIN ASPART 2 UNITS: 100 INJECTION, SOLUTION INTRAVENOUS; SUBCUTANEOUS at 03:55

## 2022-01-01 RX ADMIN — PANTOPRAZOLE SODIUM 40 MG: 40 INJECTION, POWDER, FOR SOLUTION INTRAVENOUS at 06:31

## 2022-01-01 RX ADMIN — Medication 50 MCG: at 06:41

## 2022-01-01 RX ADMIN — FENTANYL CITRATE 150 MCG/HR: 50 INJECTION, SOLUTION INTRAMUSCULAR; INTRAVENOUS at 04:39

## 2022-01-01 RX ADMIN — PROPOFOL 45 MCG/KG/MIN: 10 INJECTION, EMULSION INTRAVENOUS at 08:42

## 2022-01-01 RX ADMIN — MORPHINE SULFATE 2 MG: 2 INJECTION, SOLUTION INTRAMUSCULAR; INTRAVENOUS at 14:50

## 2022-01-01 RX ADMIN — MINERAL OIL AND WHITE PETROLATUM: 150; 830 OINTMENT OPHTHALMIC at 11:29

## 2022-01-01 RX ADMIN — CANGRELOR 4 MCG/KG/MIN: 50 INJECTION, POWDER, LYOPHILIZED, FOR SOLUTION INTRAVENOUS at 14:43

## 2022-01-01 RX ADMIN — ASPIRIN 81 MG CHEWABLE TABLET 81 MG: 81 TABLET CHEWABLE at 09:43

## 2022-01-01 RX ADMIN — FONDAPARINUX SODIUM 2.5 MG: 2.5 INJECTION SUBCUTANEOUS at 14:41

## 2022-01-01 RX ADMIN — Medication 50 MCG: at 11:20

## 2022-01-01 RX ADMIN — Medication 50 MCG: at 16:58

## 2022-01-01 RX ADMIN — MORPHINE SULFATE 2 MG: 2 INJECTION, SOLUTION INTRAMUSCULAR; INTRAVENOUS at 19:41

## 2022-01-01 RX ADMIN — INSULIN ASPART 1 UNITS: 100 INJECTION, SOLUTION INTRAVENOUS; SUBCUTANEOUS at 23:38

## 2022-01-01 RX ADMIN — POTASSIUM PHOSPHATE, MONOBASIC AND POTASSIUM PHOSPHATE, DIBASIC 15 MMOL: 224; 236 INJECTION, SOLUTION, CONCENTRATE INTRAVENOUS at 08:52

## 2022-01-01 RX ADMIN — MINERAL OIL AND WHITE PETROLATUM: 150; 830 OINTMENT OPHTHALMIC at 19:05

## 2022-01-01 RX ADMIN — MINERAL OIL AND WHITE PETROLATUM: 150; 830 OINTMENT OPHTHALMIC at 10:41

## 2022-01-01 RX ADMIN — MIDAZOLAM 2 MG: 1 INJECTION INTRAMUSCULAR; INTRAVENOUS at 19:30

## 2022-01-01 RX ADMIN — CANGRELOR 4 MCG/KG/MIN: 50 INJECTION, POWDER, LYOPHILIZED, FOR SOLUTION INTRAVENOUS at 07:32

## 2022-01-01 RX ADMIN — LEVETIRACETAM 1000 MG: 100 INJECTION, SOLUTION INTRAVENOUS at 08:53

## 2022-01-01 RX ADMIN — Medication 0.18 MCG/KG/MIN: at 15:00

## 2022-01-01 RX ADMIN — INSULIN ASPART 1 UNITS: 100 INJECTION, SOLUTION INTRAVENOUS; SUBCUTANEOUS at 23:44

## 2022-01-01 RX ADMIN — MIDAZOLAM 2 MG: 1 INJECTION INTRAMUSCULAR; INTRAVENOUS at 07:07

## 2022-01-01 RX ADMIN — PROPOFOL 60 MCG/KG/MIN: 10 INJECTION, EMULSION INTRAVENOUS at 11:25

## 2022-01-01 RX ADMIN — MIDAZOLAM HYDROCHLORIDE 10 MG/HR: 1 INJECTION, SOLUTION INTRAVENOUS at 16:29

## 2022-01-01 RX ADMIN — Medication 0.05 MCG/KG/MIN: at 02:58

## 2022-01-01 RX ADMIN — Medication 50 MCG: at 20:47

## 2022-01-01 RX ADMIN — PANTOPRAZOLE SODIUM 40 MG: 40 INJECTION, POWDER, FOR SOLUTION INTRAVENOUS at 06:37

## 2022-01-01 RX ADMIN — LORAZEPAM 1 MG: 2 INJECTION INTRAMUSCULAR; INTRAVENOUS at 05:18

## 2022-01-01 RX ADMIN — MINERAL OIL AND WHITE PETROLATUM: 150; 830 OINTMENT OPHTHALMIC at 11:39

## 2022-01-01 RX ADMIN — VASOPRESSIN 2.4 UNITS/HR: 20 INJECTION INTRAVENOUS at 21:01

## 2022-01-01 RX ADMIN — Medication 100 MCG: at 17:07

## 2022-01-01 RX ADMIN — VANCOMYCIN HYDROCHLORIDE 1500 MG: 5 INJECTION, POWDER, LYOPHILIZED, FOR SOLUTION INTRAVENOUS at 14:11

## 2022-01-01 RX ADMIN — AMIODARONE HYDROCHLORIDE 150 MG: 1.5 INJECTION, SOLUTION INTRAVENOUS at 21:59

## 2022-01-01 RX ADMIN — MIDAZOLAM HYDROCHLORIDE 10 MG/HR: 1 INJECTION, SOLUTION INTRAVENOUS at 02:22

## 2022-01-01 RX ADMIN — ACETAMINOPHEN 650 MG: 325 TABLET ORAL at 09:43

## 2022-01-01 RX ADMIN — INSULIN ASPART 1 UNITS: 100 INJECTION, SOLUTION INTRAVENOUS; SUBCUTANEOUS at 03:57

## 2022-01-01 RX ADMIN — METOCLOPRAMIDE HYDROCHLORIDE 10 MG: 5 INJECTION INTRAMUSCULAR; INTRAVENOUS at 22:11

## 2022-01-01 RX ADMIN — FUROSEMIDE 10 MG/HR: 10 INJECTION, SOLUTION INTRAVENOUS at 22:14

## 2022-01-01 RX ADMIN — MORPHINE SULFATE 2 MG: 2 INJECTION, SOLUTION INTRAMUSCULAR; INTRAVENOUS at 21:41

## 2022-01-01 RX ADMIN — LEVETIRACETAM 1000 MG: 100 INJECTION, SOLUTION INTRAVENOUS at 08:13

## 2022-01-01 RX ADMIN — POTASSIUM CHLORIDE 20 MEQ: 29.8 INJECTION, SOLUTION INTRAVENOUS at 06:59

## 2022-01-01 RX ADMIN — LEVETIRACETAM 1000 MG: 100 INJECTION, SOLUTION INTRAVENOUS at 20:25

## 2022-01-01 RX ADMIN — POTASSIUM CHLORIDE 40 MEQ: 1.5 SOLUTION ORAL at 20:57

## 2022-01-01 RX ADMIN — TICAGRELOR 90 MG: 90 TABLET ORAL at 20:58

## 2022-01-01 RX ADMIN — VANCOMYCIN HYDROCHLORIDE 1000 MG: 1 INJECTION, SOLUTION INTRAVENOUS at 15:02

## 2022-01-01 RX ADMIN — METHYLPREDNISOLONE SODIUM SUCCINATE 62.5 MG: 125 INJECTION, POWDER, FOR SOLUTION INTRAMUSCULAR; INTRAVENOUS at 16:54

## 2022-01-01 RX ADMIN — CANGRELOR 4 MCG/KG/MIN: 50 INJECTION, POWDER, LYOPHILIZED, FOR SOLUTION INTRAVENOUS at 08:12

## 2022-01-01 RX ADMIN — INSULIN ASPART 1 UNITS: 100 INJECTION, SOLUTION INTRAVENOUS; SUBCUTANEOUS at 11:41

## 2022-01-01 RX ADMIN — MORPHINE SULFATE 4 MG: 2 INJECTION, SOLUTION INTRAMUSCULAR; INTRAVENOUS at 05:07

## 2022-01-01 RX ADMIN — LEVETIRACETAM 1000 MG: 100 INJECTION, SOLUTION INTRAVENOUS at 20:10

## 2022-01-01 RX ADMIN — FUROSEMIDE 20 MG: 10 INJECTION, SOLUTION INTRAMUSCULAR; INTRAVENOUS at 03:10

## 2022-01-01 RX ADMIN — MORPHINE SULFATE 4 MG: 2 INJECTION, SOLUTION INTRAMUSCULAR; INTRAVENOUS at 01:49

## 2022-01-01 RX ADMIN — Medication 50 MCG: at 12:42

## 2022-01-01 RX ADMIN — FUROSEMIDE 20 MG: 10 INJECTION, SOLUTION INTRAMUSCULAR; INTRAVENOUS at 03:24

## 2022-01-01 RX ADMIN — CHLORHEXIDINE GLUCONATE 0.12% ORAL RINSE 15 ML: 1.2 LIQUID ORAL at 20:10

## 2022-01-01 RX ADMIN — MINERAL OIL AND WHITE PETROLATUM: 150; 830 OINTMENT OPHTHALMIC at 03:55

## 2022-01-01 RX ADMIN — CANGRELOR 4 MCG/KG/MIN: 50 INJECTION, POWDER, LYOPHILIZED, FOR SOLUTION INTRAVENOUS at 10:50

## 2022-01-01 RX ADMIN — FENTANYL CITRATE 150 MCG/HR: 50 INJECTION, SOLUTION INTRAMUSCULAR; INTRAVENOUS at 10:48

## 2022-01-01 RX ADMIN — POTASSIUM CHLORIDE 20 MEQ: 29.8 INJECTION, SOLUTION INTRAVENOUS at 05:00

## 2022-01-01 RX ADMIN — FENTANYL CITRATE 50 MCG/HR: 50 INJECTION, SOLUTION INTRAMUSCULAR; INTRAVENOUS at 05:49

## 2022-01-01 RX ADMIN — LEVETIRACETAM 1000 MG: 100 INJECTION, SOLUTION INTRAVENOUS at 20:50

## 2022-01-01 RX ADMIN — Medication 50 MCG: at 22:00

## 2022-01-01 RX ADMIN — PIPERACILLIN SODIUM AND TAZOBACTAM SODIUM 3.38 G: 3; .375 INJECTION, POWDER, LYOPHILIZED, FOR SOLUTION INTRAVENOUS at 17:24

## 2022-01-01 RX ADMIN — PIPERACILLIN SODIUM AND TAZOBACTAM SODIUM 3.38 G: 3; .375 INJECTION, POWDER, LYOPHILIZED, FOR SOLUTION INTRAVENOUS at 09:44

## 2022-01-01 RX ADMIN — ATORVASTATIN CALCIUM 80 MG: 40 TABLET, FILM COATED ORAL at 20:10

## 2022-01-01 RX ADMIN — FONDAPARINUX SODIUM 2.5 MG: 2.5 INJECTION SUBCUTANEOUS at 12:35

## 2022-01-01 RX ADMIN — POTASSIUM CHLORIDE 40 MEQ: 1.5 SOLUTION ORAL at 14:20

## 2022-01-01 RX ADMIN — Medication 1 PACKET: at 08:52

## 2022-01-01 RX ADMIN — MORPHINE SULFATE 4 MG: 2 INJECTION, SOLUTION INTRAMUSCULAR; INTRAVENOUS at 14:21

## 2022-01-01 RX ADMIN — TICAGRELOR 90 MG: 90 TABLET ORAL at 08:18

## 2022-01-01 RX ADMIN — Medication 0.18 MCG/KG/MIN: at 20:15

## 2022-01-01 RX ADMIN — PIPERACILLIN SODIUM AND TAZOBACTAM SODIUM 3.38 G: 3; .375 INJECTION, POWDER, LYOPHILIZED, FOR SOLUTION INTRAVENOUS at 02:53

## 2022-01-01 RX ADMIN — ASPIRIN 81 MG CHEWABLE TABLET 81 MG: 81 TABLET CHEWABLE at 08:12

## 2022-01-01 RX ADMIN — AMIODARONE HYDROCHLORIDE 0.5 MG/MIN: 50 INJECTION, SOLUTION INTRAVENOUS at 22:55

## 2022-01-01 RX ADMIN — CHLORHEXIDINE GLUCONATE 0.12% ORAL RINSE 15 ML: 1.2 LIQUID ORAL at 08:11

## 2022-01-01 RX ADMIN — PIPERACILLIN SODIUM AND TAZOBACTAM SODIUM 3.38 G: 3; .375 INJECTION, POWDER, LYOPHILIZED, FOR SOLUTION INTRAVENOUS at 10:18

## 2022-01-01 RX ADMIN — ASPIRIN 81 MG CHEWABLE TABLET 81 MG: 81 TABLET CHEWABLE at 08:11

## 2022-01-01 RX ADMIN — LEVETIRACETAM 1000 MG: 100 INJECTION, SOLUTION INTRAVENOUS at 08:12

## 2022-01-01 RX ADMIN — Medication 50 MCG: at 14:05

## 2022-01-01 RX ADMIN — LORAZEPAM 1 MG: 2 INJECTION INTRAMUSCULAR; INTRAVENOUS at 02:58

## 2022-01-01 RX ADMIN — PIPERACILLIN SODIUM AND TAZOBACTAM SODIUM 3.38 G: 3; .375 INJECTION, POWDER, LYOPHILIZED, FOR SOLUTION INTRAVENOUS at 09:53

## 2022-01-01 RX ADMIN — CHLORHEXIDINE GLUCONATE 0.12% ORAL RINSE 15 ML: 1.2 LIQUID ORAL at 20:57

## 2022-01-01 RX ADMIN — MIDAZOLAM 2 MG: 1 INJECTION INTRAMUSCULAR; INTRAVENOUS at 12:37

## 2022-01-01 RX ADMIN — VANCOMYCIN HYDROCHLORIDE 1000 MG: 1 INJECTION, SOLUTION INTRAVENOUS at 13:48

## 2022-01-01 RX ADMIN — LORAZEPAM 1 MG: 2 INJECTION INTRAMUSCULAR; INTRAVENOUS at 20:26

## 2022-01-01 RX ADMIN — POTASSIUM CHLORIDE 20 MEQ: 1.5 SOLUTION ORAL at 23:14

## 2022-01-01 RX ADMIN — POTASSIUM CHLORIDE 20 MEQ: 29.8 INJECTION, SOLUTION INTRAVENOUS at 11:23

## 2022-01-01 RX ADMIN — Medication 50 MCG: at 01:00

## 2022-01-01 RX ADMIN — FENTANYL CITRATE 75 MCG/HR: 50 INJECTION, SOLUTION INTRAMUSCULAR; INTRAVENOUS at 09:49

## 2022-01-01 RX ADMIN — FUROSEMIDE 15 MG/HR: 10 INJECTION, SOLUTION INTRAVENOUS at 11:24

## 2022-01-01 RX ADMIN — PIPERACILLIN SODIUM AND TAZOBACTAM SODIUM 3.38 G: 3; .375 INJECTION, POWDER, LYOPHILIZED, FOR SOLUTION INTRAVENOUS at 02:11

## 2022-01-01 RX ADMIN — CANGRELOR 4 MCG/KG/MIN: 50 INJECTION, POWDER, LYOPHILIZED, FOR SOLUTION INTRAVENOUS at 05:06

## 2022-01-01 RX ADMIN — LEVETIRACETAM 1000 MG: 100 INJECTION, SOLUTION INTRAVENOUS at 20:23

## 2022-01-01 RX ADMIN — INSULIN ASPART 1 UNITS: 100 INJECTION, SOLUTION INTRAVENOUS; SUBCUTANEOUS at 09:44

## 2022-01-01 RX ADMIN — METOLAZONE 2.5 MG: 2.5 TABLET ORAL at 09:43

## 2022-01-01 RX ADMIN — MORPHINE SULFATE 4 MG: 2 INJECTION, SOLUTION INTRAMUSCULAR; INTRAVENOUS at 02:50

## 2022-01-01 RX ADMIN — POTASSIUM PHOSPHATE, MONOBASIC AND POTASSIUM PHOSPHATE, DIBASIC 15 MMOL: 224; 236 INJECTION, SOLUTION, CONCENTRATE INTRAVENOUS at 11:24

## 2022-01-01 RX ADMIN — PROPOFOL 60 MCG/KG/MIN: 10 INJECTION, EMULSION INTRAVENOUS at 03:19

## 2022-01-01 RX ADMIN — MIDAZOLAM HYDROCHLORIDE 10 MG/HR: 1 INJECTION, SOLUTION INTRAVENOUS at 06:40

## 2022-01-01 RX ADMIN — FUROSEMIDE 20 MG: 10 INJECTION, SOLUTION INTRAMUSCULAR; INTRAVENOUS at 11:52

## 2022-01-01 RX ADMIN — PIPERACILLIN SODIUM AND TAZOBACTAM SODIUM 3.38 G: 3; .375 INJECTION, POWDER, LYOPHILIZED, FOR SOLUTION INTRAVENOUS at 10:02

## 2022-01-01 RX ADMIN — CANGRELOR 4 MCG/KG/MIN: 50 INJECTION, POWDER, LYOPHILIZED, FOR SOLUTION INTRAVENOUS at 14:39

## 2022-01-01 RX ADMIN — FENTANYL CITRATE 150 MCG/HR: 50 INJECTION, SOLUTION INTRAMUSCULAR; INTRAVENOUS at 14:29

## 2022-01-01 RX ADMIN — INSULIN ASPART 1 UNITS: 100 INJECTION, SOLUTION INTRAVENOUS; SUBCUTANEOUS at 15:55

## 2022-01-01 RX ADMIN — PROPOFOL 40 MCG/KG/MIN: 10 INJECTION, EMULSION INTRAVENOUS at 09:58

## 2022-01-01 RX ADMIN — METOCLOPRAMIDE HYDROCHLORIDE 10 MG: 5 INJECTION INTRAMUSCULAR; INTRAVENOUS at 06:03

## 2022-01-01 RX ADMIN — FUROSEMIDE 60 MG: 10 INJECTION, SOLUTION INTRAMUSCULAR; INTRAVENOUS at 03:52

## 2022-01-01 RX ADMIN — ATORVASTATIN CALCIUM 80 MG: 40 TABLET, FILM COATED ORAL at 20:04

## 2022-01-01 RX ADMIN — Medication 0.08 MCG/KG/MIN: at 05:53

## 2022-01-01 RX ADMIN — PIPERACILLIN SODIUM AND TAZOBACTAM SODIUM 3.38 G: 3; .375 INJECTION, POWDER, LYOPHILIZED, FOR SOLUTION INTRAVENOUS at 18:42

## 2022-01-01 RX ADMIN — FENTANYL CITRATE 100 MCG/HR: 50 INJECTION, SOLUTION INTRAMUSCULAR; INTRAVENOUS at 09:56

## 2022-01-01 RX ADMIN — LEVETIRACETAM 1000 MG: 100 INJECTION, SOLUTION INTRAVENOUS at 09:12

## 2022-01-01 RX ADMIN — ALBUTEROL SULFATE 2.5 MG: 2.5 SOLUTION RESPIRATORY (INHALATION) at 20:17

## 2022-01-01 RX ADMIN — MINERAL OIL AND WHITE PETROLATUM: 150; 830 OINTMENT OPHTHALMIC at 11:43

## 2022-01-01 RX ADMIN — Medication 0.27 MCG/KG/MIN: at 10:11

## 2022-01-01 RX ADMIN — MEPERIDINE HYDROCHLORIDE 25 MG: 25 INJECTION INTRAMUSCULAR; INTRAVENOUS; SUBCUTANEOUS at 09:10

## 2022-01-01 RX ADMIN — PROPOFOL 35 MCG/KG/MIN: 10 INJECTION, EMULSION INTRAVENOUS at 04:12

## 2022-01-01 RX ADMIN — MIDAZOLAM 2 MG: 1 INJECTION INTRAMUSCULAR; INTRAVENOUS at 08:38

## 2022-01-01 RX ADMIN — CANGRELOR 4 MCG/KG/MIN: 50 INJECTION, POWDER, LYOPHILIZED, FOR SOLUTION INTRAVENOUS at 22:16

## 2022-01-01 RX ADMIN — PROPOFOL 35 MCG/KG/MIN: 10 INJECTION, EMULSION INTRAVENOUS at 14:16

## 2022-01-01 RX ADMIN — Medication 50 MCG: at 04:42

## 2022-01-01 RX ADMIN — FENTANYL CITRATE 100 MCG: 50 INJECTION, SOLUTION INTRAMUSCULAR; INTRAVENOUS at 13:30

## 2022-01-01 RX ADMIN — PANTOPRAZOLE SODIUM 40 MG: 40 INJECTION, POWDER, FOR SOLUTION INTRAVENOUS at 06:32

## 2022-01-01 RX ADMIN — FENTANYL CITRATE 100 MCG: 50 INJECTION, SOLUTION INTRAMUSCULAR; INTRAVENOUS at 12:50

## 2022-01-01 RX ADMIN — PIPERACILLIN SODIUM AND TAZOBACTAM SODIUM 3.38 G: 3; .375 INJECTION, POWDER, LYOPHILIZED, FOR SOLUTION INTRAVENOUS at 18:02

## 2022-01-01 RX ADMIN — MINERAL OIL AND WHITE PETROLATUM: 150; 830 OINTMENT OPHTHALMIC at 02:47

## 2022-01-01 RX ADMIN — POTASSIUM CHLORIDE 20 MEQ: 29.8 INJECTION, SOLUTION INTRAVENOUS at 12:47

## 2022-01-01 RX ADMIN — Medication 50 MCG: at 17:32

## 2022-01-01 RX ADMIN — MINERAL OIL AND WHITE PETROLATUM: 150; 830 OINTMENT OPHTHALMIC at 12:32

## 2022-01-01 RX ADMIN — GLYCOPYRROLATE 0.2 MG: 0.2 INJECTION, SOLUTION INTRAMUSCULAR; INTRAVENOUS at 14:22

## 2022-01-01 RX ADMIN — HEPARIN SODIUM 1800 UNITS/HR: 1000 INJECTION INTRAVENOUS; SUBCUTANEOUS at 17:34

## 2022-01-01 RX ADMIN — Medication 30 MG: at 13:53

## 2022-01-01 RX ADMIN — CHLORHEXIDINE GLUCONATE 0.12% ORAL RINSE 15 ML: 1.2 LIQUID ORAL at 08:12

## 2022-01-01 RX ADMIN — MINERAL OIL AND WHITE PETROLATUM: 150; 830 OINTMENT OPHTHALMIC at 20:40

## 2022-01-01 RX ADMIN — ASPIRIN 300 MG: 300 SUPPOSITORY RECTAL at 10:28

## 2022-01-01 RX ADMIN — PROPOFOL 20 MCG/KG/MIN: 10 INJECTION, EMULSION INTRAVENOUS at 17:42

## 2022-01-01 RX ADMIN — ASPIRIN 300 MG: 300 SUPPOSITORY RECTAL at 10:20

## 2022-01-01 RX ADMIN — Medication 50 MCG: at 15:13

## 2022-01-01 RX ADMIN — MORPHINE SULFATE 4 MG: 2 INJECTION, SOLUTION INTRAMUSCULAR; INTRAVENOUS at 12:58

## 2022-01-01 RX ADMIN — HEPARIN SODIUM 1800 UNITS/HR: 1000 INJECTION INTRAVENOUS; SUBCUTANEOUS at 04:51

## 2022-01-01 RX ADMIN — MINERAL OIL AND WHITE PETROLATUM: 150; 830 OINTMENT OPHTHALMIC at 03:51

## 2022-01-01 RX ADMIN — ALBUTEROL SULFATE 2.5 MG: 2.5 SOLUTION RESPIRATORY (INHALATION) at 07:16

## 2022-01-01 RX ADMIN — LORAZEPAM 1 MG: 2 INJECTION INTRAMUSCULAR; INTRAVENOUS at 14:50

## 2022-01-01 RX ADMIN — PROPOFOL 50 MCG/KG/MIN: 10 INJECTION, EMULSION INTRAVENOUS at 22:59

## 2022-01-01 RX ADMIN — Medication 0.22 MCG/KG/MIN: at 23:39

## 2022-01-01 RX ADMIN — Medication 50 MCG: at 06:32

## 2022-01-01 RX ADMIN — Medication 0.1 MCG/KG/MIN: at 04:01

## 2022-01-01 RX ADMIN — METOPROLOL TARTRATE 5 MG: 5 INJECTION INTRAVENOUS at 09:39

## 2022-01-01 RX ADMIN — Medication 50 MCG: at 03:44

## 2022-01-01 RX ADMIN — MORPHINE SULFATE 2 MG: 2 INJECTION, SOLUTION INTRAMUSCULAR; INTRAVENOUS at 15:34

## 2022-01-01 RX ADMIN — MORPHINE SULFATE 4 MG: 2 INJECTION, SOLUTION INTRAMUSCULAR; INTRAVENOUS at 09:08

## 2022-01-01 RX ADMIN — POTASSIUM CHLORIDE 20 MEQ: 29.8 INJECTION, SOLUTION INTRAVENOUS at 05:17

## 2022-01-01 RX ADMIN — CANGRELOR 4 MCG/KG/MIN: 50 INJECTION, POWDER, LYOPHILIZED, FOR SOLUTION INTRAVENOUS at 03:09

## 2022-01-01 RX ADMIN — POTASSIUM CHLORIDE 20 MEQ: 29.8 INJECTION, SOLUTION INTRAVENOUS at 08:12

## 2022-01-01 RX ADMIN — INSULIN ASPART 1 UNITS: 100 INJECTION, SOLUTION INTRAVENOUS; SUBCUTANEOUS at 11:59

## 2022-01-01 RX ADMIN — CARVEDILOL 12.5 MG: 12.5 TABLET, FILM COATED ORAL at 20:11

## 2022-01-01 RX ADMIN — ALBUTEROL SULFATE 2.5 MG: 2.5 SOLUTION RESPIRATORY (INHALATION) at 07:13

## 2022-01-01 RX ADMIN — CARVEDILOL 12.5 MG: 12.5 TABLET, FILM COATED ORAL at 20:04

## 2022-01-01 RX ADMIN — PIPERACILLIN SODIUM AND TAZOBACTAM SODIUM 3.38 G: 3; .375 INJECTION, POWDER, LYOPHILIZED, FOR SOLUTION INTRAVENOUS at 10:20

## 2022-01-01 RX ADMIN — POTASSIUM CHLORIDE 40 MEQ: 1.5 SOLUTION ORAL at 06:10

## 2022-01-01 RX ADMIN — INSULIN HUMAN 12 UNITS/HR: 1 INJECTION, SOLUTION INTRAVENOUS at 04:04

## 2022-01-01 RX ADMIN — Medication 50 MCG: at 14:30

## 2022-01-01 RX ADMIN — Medication 0.08 MCG/KG/MIN: at 18:37

## 2022-01-01 RX ADMIN — CHLORHEXIDINE GLUCONATE 0.12% ORAL RINSE 15 ML: 1.2 LIQUID ORAL at 20:34

## 2022-01-01 RX ADMIN — CANGRELOR 4 MCG/KG/MIN: 50 INJECTION, POWDER, LYOPHILIZED, FOR SOLUTION INTRAVENOUS at 19:37

## 2022-01-01 RX ADMIN — ALBUTEROL SULFATE 2.5 MG: 2.5 SOLUTION RESPIRATORY (INHALATION) at 07:41

## 2022-01-01 RX ADMIN — LORAZEPAM 1 MG: 2 INJECTION INTRAMUSCULAR; INTRAVENOUS at 09:07

## 2022-01-01 RX ADMIN — CHLORHEXIDINE GLUCONATE 0.12% ORAL RINSE 15 ML: 1.2 LIQUID ORAL at 09:43

## 2022-01-01 RX ADMIN — PROPOFOL 40 MCG/KG/MIN: 10 INJECTION, EMULSION INTRAVENOUS at 15:05

## 2022-01-01 RX ADMIN — FUROSEMIDE 15 MG/HR: 10 INJECTION, SOLUTION INTRAVENOUS at 16:46

## 2022-01-01 RX ADMIN — Medication 1 PACKET: at 08:12

## 2022-01-01 RX ADMIN — GLYCOPYRROLATE 0.2 MG: 0.2 INJECTION, SOLUTION INTRAMUSCULAR; INTRAVENOUS at 10:35

## 2022-01-01 RX ADMIN — ATORVASTATIN CALCIUM 80 MG: 40 TABLET, FILM COATED ORAL at 20:58

## 2022-01-01 RX ADMIN — ASPIRIN 300 MG: 300 SUPPOSITORY RECTAL at 11:38

## 2022-01-01 RX ADMIN — DOPAMINE HYDROCHLORIDE 2 MCG/KG/MIN: 160 INJECTION, SOLUTION INTRAVENOUS at 02:17

## 2022-01-01 RX ADMIN — MORPHINE SULFATE 2 MG: 2 INJECTION, SOLUTION INTRAMUSCULAR; INTRAVENOUS at 09:58

## 2022-01-01 RX ADMIN — POTASSIUM CHLORIDE 20 MEQ: 29.8 INJECTION, SOLUTION INTRAVENOUS at 06:18

## 2022-01-01 RX ADMIN — ATORVASTATIN CALCIUM 80 MG: 40 TABLET, FILM COATED ORAL at 20:45

## 2022-01-01 RX ADMIN — MAGNESIUM SULFATE HEPTAHYDRATE 4 G: 80 INJECTION, SOLUTION INTRAVENOUS at 08:01

## 2022-01-01 RX ADMIN — ALBUTEROL SULFATE 2.5 MG: 2.5 SOLUTION RESPIRATORY (INHALATION) at 20:56

## 2022-01-01 RX ADMIN — CANGRELOR 4 MCG/KG/MIN: 50 INJECTION, POWDER, LYOPHILIZED, FOR SOLUTION INTRAVENOUS at 00:11

## 2022-01-01 RX ADMIN — PROPOFOL 50 MCG/KG/MIN: 10 INJECTION, EMULSION INTRAVENOUS at 21:03

## 2022-01-01 RX ADMIN — CHLORHEXIDINE GLUCONATE 0.12% ORAL RINSE 15 ML: 1.2 LIQUID ORAL at 20:04

## 2022-01-01 RX ADMIN — TICAGRELOR 90 MG: 90 TABLET ORAL at 20:11

## 2022-01-01 RX ADMIN — DEXTROSE MONOHYDRATE 1000 ML: 50 INJECTION, SOLUTION INTRAVENOUS at 09:44

## 2022-01-01 RX ADMIN — POTASSIUM CHLORIDE 20 MEQ: 29.8 INJECTION, SOLUTION INTRAVENOUS at 23:00

## 2022-01-01 RX ADMIN — FUROSEMIDE 15 MG/HR: 10 INJECTION, SOLUTION INTRAVENOUS at 09:56

## 2022-01-01 RX ADMIN — MIDAZOLAM 2 MG: 1 INJECTION INTRAMUSCULAR; INTRAVENOUS at 16:54

## 2022-01-01 RX ADMIN — ETOMIDATE 30 MG: 20 INJECTION, SOLUTION INTRAVENOUS at 13:44

## 2022-01-01 RX ADMIN — AMIODARONE HYDROCHLORIDE 0.5 MG/MIN: 50 INJECTION, SOLUTION INTRAVENOUS at 11:24

## 2022-01-01 RX ADMIN — CARVEDILOL 12.5 MG: 12.5 TABLET, FILM COATED ORAL at 08:53

## 2022-01-01 RX ADMIN — POTASSIUM CHLORIDE 20 MEQ: 29.8 INJECTION, SOLUTION INTRAVENOUS at 07:52

## 2022-01-01 RX ADMIN — Medication 50 MCG: at 15:51

## 2022-01-01 RX ADMIN — LEVETIRACETAM 1000 MG: 100 INJECTION, SOLUTION INTRAVENOUS at 20:43

## 2022-01-01 RX ADMIN — SCOPALAMINE 1 PATCH: 1 PATCH, EXTENDED RELEASE TRANSDERMAL at 10:36

## 2022-01-01 RX ADMIN — MINERAL OIL AND WHITE PETROLATUM: 150; 830 OINTMENT OPHTHALMIC at 19:40

## 2022-01-01 RX ADMIN — Medication 50 MCG: at 01:08

## 2022-01-01 RX ADMIN — VANCOMYCIN HYDROCHLORIDE 1000 MG: 1 INJECTION, SOLUTION INTRAVENOUS at 01:20

## 2022-01-01 RX ADMIN — MIDAZOLAM 2 MG: 1 INJECTION INTRAMUSCULAR; INTRAVENOUS at 22:42

## 2022-01-01 RX ADMIN — FENTANYL CITRATE 100 MCG/HR: 50 INJECTION, SOLUTION INTRAMUSCULAR; INTRAVENOUS at 02:23

## 2022-01-01 RX ADMIN — ALBUTEROL SULFATE 2.5 MG: 2.5 SOLUTION RESPIRATORY (INHALATION) at 08:18

## 2022-01-01 RX ADMIN — PIPERACILLIN SODIUM AND TAZOBACTAM SODIUM 3.38 G: 3; .375 INJECTION, POWDER, LYOPHILIZED, FOR SOLUTION INTRAVENOUS at 09:54

## 2022-01-01 RX ADMIN — SODIUM CHLORIDE, POTASSIUM CHLORIDE, SODIUM LACTATE AND CALCIUM CHLORIDE: 600; 310; 30; 20 INJECTION, SOLUTION INTRAVENOUS at 04:22

## 2022-01-01 RX ADMIN — LORAZEPAM 1 MG: 2 INJECTION INTRAMUSCULAR; INTRAVENOUS at 04:44

## 2022-01-01 RX ADMIN — PROPOFOL 60 MCG/KG/MIN: 10 INJECTION, EMULSION INTRAVENOUS at 15:05

## 2022-01-01 RX ADMIN — ALBUTEROL SULFATE 2.5 MG: 2.5 SOLUTION RESPIRATORY (INHALATION) at 14:00

## 2022-01-01 RX ADMIN — TICAGRELOR 180 MG: 90 TABLET ORAL at 21:31

## 2022-01-01 RX ADMIN — TICAGRELOR 90 MG: 90 TABLET ORAL at 08:53

## 2022-01-01 RX ADMIN — PIPERACILLIN SODIUM AND TAZOBACTAM SODIUM 3.38 G: 3; .375 INJECTION, POWDER, LYOPHILIZED, FOR SOLUTION INTRAVENOUS at 17:48

## 2022-01-01 RX ADMIN — HEPARIN SODIUM 1800 UNITS/HR: 1000 INJECTION INTRAVENOUS; SUBCUTANEOUS at 03:20

## 2022-01-01 RX ADMIN — CANGRELOR 4 MCG/KG/MIN: 50 INJECTION, POWDER, LYOPHILIZED, FOR SOLUTION INTRAVENOUS at 00:44

## 2022-01-01 RX ADMIN — Medication 1 PACKET: at 20:58

## 2022-01-01 RX ADMIN — Medication 0.05 MCG/KG/MIN: at 19:34

## 2022-01-01 RX ADMIN — PROPOFOL 55 MCG/KG/MIN: 10 INJECTION, EMULSION INTRAVENOUS at 03:17

## 2022-01-01 RX ADMIN — PERFLUTREN 3 ML: 6.52 INJECTION, SUSPENSION INTRAVENOUS at 10:30

## 2022-01-01 RX ADMIN — CANGRELOR 4 MCG/KG/MIN: 50 INJECTION, POWDER, LYOPHILIZED, FOR SOLUTION INTRAVENOUS at 05:43

## 2022-01-01 RX ADMIN — INSULIN ASPART 1 UNITS: 100 INJECTION, SOLUTION INTRAVENOUS; SUBCUTANEOUS at 08:11

## 2022-01-01 RX ADMIN — LEVETIRACETAM 1000 MG: 100 INJECTION, SOLUTION INTRAVENOUS at 07:55

## 2022-01-01 RX ADMIN — MIDAZOLAM 2 MG: 1 INJECTION INTRAMUSCULAR; INTRAVENOUS at 06:07

## 2022-01-01 RX ADMIN — FUROSEMIDE 20 MG: 10 INJECTION, SOLUTION INTRAMUSCULAR; INTRAVENOUS at 10:03

## 2022-01-01 RX ADMIN — Medication 50 MCG: at 21:00

## 2022-01-01 RX ADMIN — FUROSEMIDE 15 MG/HR: 10 INJECTION, SOLUTION INTRAVENOUS at 18:48

## 2022-01-01 RX ADMIN — PROPOFOL 50 MCG/KG/MIN: 10 INJECTION, EMULSION INTRAVENOUS at 04:13

## 2022-01-01 RX ADMIN — AMIODARONE HYDROCHLORIDE 1 MG/MIN: 50 INJECTION, SOLUTION INTRAVENOUS at 22:32

## 2022-01-01 RX ADMIN — TICAGRELOR 90 MG: 90 TABLET ORAL at 20:45

## 2022-01-01 RX ADMIN — LEVETIRACETAM 1000 MG: 100 INJECTION, SOLUTION INTRAVENOUS at 08:11

## 2022-01-01 ASSESSMENT — ACTIVITIES OF DAILY LIVING (ADL)
ADLS_ACUITY_SCORE: 17
ADLS_ACUITY_SCORE: 15
ADLS_ACUITY_SCORE: 13
ADLS_ACUITY_SCORE: 17
ADLS_ACUITY_SCORE: 13
ADLS_ACUITY_SCORE: 22
ADLS_ACUITY_SCORE: 15
ADLS_ACUITY_SCORE: 15
ADLS_ACUITY_SCORE: 17
ADLS_ACUITY_SCORE: 17
ADLS_ACUITY_SCORE: 13
ADLS_ACUITY_SCORE: 15
ADLS_ACUITY_SCORE: 15
ADLS_ACUITY_SCORE: 19
ADLS_ACUITY_SCORE: 17
ADLS_ACUITY_SCORE: 22
ADLS_ACUITY_SCORE: 13
ADLS_ACUITY_SCORE: 15
ADLS_ACUITY_SCORE: 12
ADLS_ACUITY_SCORE: 13
ADLS_ACUITY_SCORE: 13
ADLS_ACUITY_SCORE: 17
ADLS_ACUITY_SCORE: 12
ADLS_ACUITY_SCORE: 17
ADLS_ACUITY_SCORE: 22
ADLS_ACUITY_SCORE: 17
ADLS_ACUITY_SCORE: 17
ADLS_ACUITY_SCORE: 13
ADLS_ACUITY_SCORE: 17
ADLS_ACUITY_SCORE: 15
ADLS_ACUITY_SCORE: 19
ADLS_ACUITY_SCORE: 22
ADLS_ACUITY_SCORE: 13
ADLS_ACUITY_SCORE: 17
ADLS_ACUITY_SCORE: 15
ADLS_ACUITY_SCORE: 17
ADLS_ACUITY_SCORE: 17
ADLS_ACUITY_SCORE: 13
ADLS_ACUITY_SCORE: 15
ADLS_ACUITY_SCORE: 15
ADLS_ACUITY_SCORE: 13
ADLS_ACUITY_SCORE: 22
ADLS_ACUITY_SCORE: 17
ADLS_ACUITY_SCORE: 13
ADLS_ACUITY_SCORE: 13
ADLS_ACUITY_SCORE: 15
ADLS_ACUITY_SCORE: 13
ADLS_ACUITY_SCORE: 17
ADLS_ACUITY_SCORE: 17
ADLS_ACUITY_SCORE: 15
ADLS_ACUITY_SCORE: 9
ADLS_ACUITY_SCORE: 17
ADLS_ACUITY_SCORE: 17
ADLS_ACUITY_SCORE: 15
ADLS_ACUITY_SCORE: 15
ADLS_ACUITY_SCORE: 17
ADLS_ACUITY_SCORE: 17
ADLS_ACUITY_SCORE: 13
ADLS_ACUITY_SCORE: 17
ADLS_ACUITY_SCORE: 13
ADLS_ACUITY_SCORE: 13
ADLS_ACUITY_SCORE: 12
ADLS_ACUITY_SCORE: 22
ADLS_ACUITY_SCORE: 22
ADLS_ACUITY_SCORE: 13
ADLS_ACUITY_SCORE: 11
ADLS_ACUITY_SCORE: 22
ADLS_ACUITY_SCORE: 13
ADLS_ACUITY_SCORE: 15
ADLS_ACUITY_SCORE: 9
ADLS_ACUITY_SCORE: 22
ADLS_ACUITY_SCORE: 17
ADLS_ACUITY_SCORE: 17
ADLS_ACUITY_SCORE: 19
ADLS_ACUITY_SCORE: 17
ADLS_ACUITY_SCORE: 17
ADLS_ACUITY_SCORE: 11
ADLS_ACUITY_SCORE: 17
ADLS_ACUITY_SCORE: 13
ADLS_ACUITY_SCORE: 22
ADLS_ACUITY_SCORE: 17
ADLS_ACUITY_SCORE: 15
ADLS_ACUITY_SCORE: 17
ADLS_ACUITY_SCORE: 11
ADLS_ACUITY_SCORE: 13
ADLS_ACUITY_SCORE: 13
ADLS_ACUITY_SCORE: 17
ADLS_ACUITY_SCORE: 22
ADLS_ACUITY_SCORE: 9
ADLS_ACUITY_SCORE: 13
ADLS_ACUITY_SCORE: 17
ADLS_ACUITY_SCORE: 17
ADLS_ACUITY_SCORE: 13
ADLS_ACUITY_SCORE: 17
ADLS_ACUITY_SCORE: 13
ADLS_ACUITY_SCORE: 17
ADLS_ACUITY_SCORE: 17
ADLS_ACUITY_SCORE: 13
ADLS_ACUITY_SCORE: 12
ADLS_ACUITY_SCORE: 17
ADLS_ACUITY_SCORE: 12
ADLS_ACUITY_SCORE: 15
ADLS_ACUITY_SCORE: 17
ADLS_ACUITY_SCORE: 17
DEPENDENT_IADLS:: INDEPENDENT
ADLS_ACUITY_SCORE: 13
ADLS_ACUITY_SCORE: 13
ADLS_ACUITY_SCORE: 17
ADLS_ACUITY_SCORE: 13
ADLS_ACUITY_SCORE: 17
ADLS_ACUITY_SCORE: 9
ADLS_ACUITY_SCORE: 22
ADLS_ACUITY_SCORE: 17
ADLS_ACUITY_SCORE: 13
ADLS_ACUITY_SCORE: 17
ADLS_ACUITY_SCORE: 9
ADLS_ACUITY_SCORE: 13
ADLS_ACUITY_SCORE: 15
ADLS_ACUITY_SCORE: 17
ADLS_ACUITY_SCORE: 22
ADLS_ACUITY_SCORE: 17
ADLS_ACUITY_SCORE: 13
ADLS_ACUITY_SCORE: 13
ADLS_ACUITY_SCORE: 17
ADLS_ACUITY_SCORE: 15
ADLS_ACUITY_SCORE: 17
ADLS_ACUITY_SCORE: 13
ADLS_ACUITY_SCORE: 12
ADLS_ACUITY_SCORE: 17
ADLS_ACUITY_SCORE: 12
ADLS_ACUITY_SCORE: 17
ADLS_ACUITY_SCORE: 17
ADLS_ACUITY_SCORE: 22
ADLS_ACUITY_SCORE: 13
ADLS_ACUITY_SCORE: 17
ADLS_ACUITY_SCORE: 17
ADLS_ACUITY_SCORE: 13
ADLS_ACUITY_SCORE: 15
ADLS_ACUITY_SCORE: 17
ADLS_ACUITY_SCORE: 17
ADLS_ACUITY_SCORE: 13
ADLS_ACUITY_SCORE: 13
ADLS_ACUITY_SCORE: 17
ADLS_ACUITY_SCORE: 22
ADLS_ACUITY_SCORE: 17
ADLS_ACUITY_SCORE: 9
ADLS_ACUITY_SCORE: 15
ADLS_ACUITY_SCORE: 22
ADLS_ACUITY_SCORE: 13
ADLS_ACUITY_SCORE: 17
ADLS_ACUITY_SCORE: 17
ADLS_ACUITY_SCORE: 13
ADLS_ACUITY_SCORE: 17
ADLS_ACUITY_SCORE: 15
ADLS_ACUITY_SCORE: 11
ADLS_ACUITY_SCORE: 11
ADLS_ACUITY_SCORE: 17
ADLS_ACUITY_SCORE: 17
ADLS_ACUITY_SCORE: 13
ADLS_ACUITY_SCORE: 13
ADLS_ACUITY_SCORE: 17

## 2022-01-01 ASSESSMENT — MIFFLIN-ST. JEOR
SCORE: 1898.88
SCORE: 1961.88
SCORE: 1829.88
SCORE: 1953.88

## 2022-01-14 NOTE — Clinical Note
The first balloon was inserted into the left anterior descending and proximal left anterior descending.Max pressure = 12 maria isabel. Total duration = 15 seconds.

## 2022-01-14 NOTE — Clinical Note
Stent deployed in the proximal left anterior descending. Max pressure = 11 maria isabel. Total duration = 10 seconds.

## 2022-01-14 NOTE — Clinical Note
The first balloon was inserted into the left anterior descending and proximal left anterior descending.Max pressure = 14 maria isabel. Total duration = 20 seconds.     Max pressure = 14 maria isabel. Total duration = 15 seconds.    Balloon reinflated a second time: Max pressure = 14 maria isabel. Total duration = 15 seconds.

## 2022-01-14 NOTE — ED PROVIDER NOTES
History   Chief Complaint:  Cardiac Arrest     The history is provided by the EMS personnel.      Jonathan De La Rosa is a 56 year old male with history of hypertension and hyperlipidemia who presents with cardiac arrest. Patient presents via EMS from home. Wife reports patient developed chest pain yesterday at noon. The patient collapsed at home today and wife started compressions. Fire started compressions on their arrival. He was shocked twice at home and shocked again en route. EMS got his pulse back and pulse was maintained en route. He is breathing on his own but was bagged to assist. Patient found to have a stemi and be in a-fib for EMS. EMS did not give any medications. No cardiac history. Patient is on a blood pressure medication but has not taken medication for several months.     Review of Systems   Cardiovascular: Positive for chest pain.     Allergies:  Penicillins     Medications:  Lipitor   Norvasc   Hydrochlorothiazide     Past Medical History:     Hypercholesterolemia   Hypertension     Past Surgical History:    Colonoscopy   Knee arthroscopy   Partial medial meniscectomy     Social History:  Presents to ED alone   Presents via EMS       Physical Exam     Patient Vitals for the past 24 hrs:   BP Pulse SpO2 Weight   01/14/22 1341 -- -- 94 % 70 kg (154 lb 5.2 oz)   01/14/22 1339 (!) 169/119 (!) 139 -- --       Physical Exam  Vitals: reviewed by me  General: Pt seen on [facility] in place, but not active, unresponsive  Eyes: Tracking poorly minimally reactive  ENT: MMM, midline trachea.  Being bagged with occasional voluntary breaths  Lungs: Agonal breathing  CV: Rate as above  Abd: Soft, non tender, no guarding, no rebound. Non distended  MSK: no joint effusion.  No evidence of trauma  Skin: No rash  Neuro: Unresponsive, breathing independently with support  Psych: Deferred      Emergency Department Course   ECG  Sinus tachycardia with a rate of 139, clear ST elevation in V1, V2, V3,  V4.  Reciprocal ST changes noted in the inferior leads.  No ectopy.  Acute MI, STEMI.    Imaging:  XR Chest Port 1 View   Final Result   IMPRESSION: Endotracheal tube tip 3.7 cm from the david. Enteric tube   tip in the distal esophagus, consider advancement given the distention   of the stomach. Mild-moderate interstitial and groundglass infiltrates   bilaterally. This has an upper lobe predominance.       TRINY PRIETO MD            SYSTEM ID:  [id number]      Cardiac Catheterization    (Results Pending)   Report per radiology    Laboratory:  Labs Ordered and Resulted from Time of ED Arrival to Time of ED Departure   CBC WITH PLATELETS AND DIFFERENTIAL - Abnormal       Result Value    WBC Count 14.6 (*)     RBC Count 5.43      Hemoglobin 16.5      Hematocrit 50.6      MCV 93      MCH 30.4      MCHC 32.6      RDW 13.3      Platelet Count 216      % Neutrophils 62      % Lymphocytes 31      % Monocytes 2      % Eosinophils 1      % Basophils 1      % Immature Granulocytes 3      NRBCs per 100 WBC 0      Absolute Neutrophils 9.1 (*)     Absolute Lymphocytes 4.5      Absolute Monocytes 0.3      Absolute Eosinophils 0.1      Absolute Basophils 0.1      Absolute Immature Granulocytes 0.5 (*)     Absolute NRBCs 0.0     ISTAT GASES LACTATE VENOUS POCT - Abnormal    Lactic Acid POCT 9.5 (*)     Bicarbonate Venous POCT 15 (*)     O2 Sat, Venous POCT 91 (*)     pCO2V Venous POCT 41      pH Venous POCT 7.16 (*)     pO2 Venous POCT 77 (*)    ISTAT TROPONIN POCT - Abnormal    TROPPC POCT 0.87 (*)    COVID-19 VIRUS (CORONAVIRUS) BY PCR   TYPE AND SCREEN, ADULT   ABO/RH TYPE AND SCREEN          [facility]    -Intubation    Date/Time: 1/14/2022 2:45 PM  Performed by: Chilo Fitch MD  Authorized by: Chilo Fitch MD     Risks, benefits and alternatives discussed.      PRE-PROCEDURE DETAILS     Patient status:  Unresponsive    Pretreatment medications:  None     Paralytics:  Rocuronium      PROCEDURE DETAILS     Preoxygenation:  Bag valve mask    CPR in progress: no      Intubation method:  Oral    Oral intubation technique:  Video-assisted    Laryngoscope blade:  Mac 4    Tube size (mm):  7.5    Tube type:  Cuffed    Number of attempts:  1    Cricoid pressure: no      Tube visualized through cords: yes      PLACEMENT ASSESSMENT     ETT to teeth:  23    Tube secured with:  ETT chavarria    Breath sounds:  Equal and absent over the epigastrium    Placement verification: chest rise, condensation, CXR verification, direct visualization, equal breath sounds, ETCO2 detector and tube exhalation      CXR findings:  ETT in proper place    PROCEDURE    Patient Tolerance:  Patient tolerated the procedure well with no immediate complications   Done with ED resident, Torsten Beard MD      Emergency Department Course:    Reviewed:  I reviewed nursing notes, vitals and past medical history    Assessments:  1336 I obtained history and examined the patient as noted above. Red team activated prior to patient arrival.     Consults:  I spoke with Interventional Cardiology regarding patient's presentation, findings, and plan of care.     Interventions:  Medications   ticagrelor (BRILINTA) tablet 180 mg (has no administration in time range)   heparin (porcine) injection 1000 units/mL (rounds in 500 unit increments) (has no administration in time range)   aspirin (ASA) Suppository 300 mg (300 mg Rectal Not Given 1/14/22 1413)   Medication Considerations - To maintain platelet inhibition after discontinuation of cangrelor (KENGREAL) [see admin instructions] (has no administration in time range)   cangrelor (KENGREAL) 200 mcg/mL bolus dose from infusion pump 2.1 mg (2.1 mg Intravenous Given 1/14/22 1418)     Followed by   cangrelor (KENGREAL) 50 mg in sodium chloride 0.9 % 250 mL infusion (4 mcg/kg/min × 70 kg Intravenous New Bag 1/14/22 1418)   midazolam (VERSED) injection (1 mg Intravenous Given  1/14/22 1420)   fentaNYL (PF) (SUBLIMAZE) injection (50 mcg Intravenous Given 1/14/22 1420)   sodium bicarbonate 8.4 % injection (has no administration in time range)   heparin (porcine) injection (8,000 Units Intravenous Given 1/14/22 1424)   heparin (porcine) 1000 UNIT/ML injection (has no administration in time range)   propofol (DIPRIVAN) 1000 MG/100ML infusion (0 mcg/kg/min  ED Infusing on Admission/transfer 1/14/22 1413)   propofol (DIPRIVAN) bolus from infusion pump 30 mg (30 mg Intravenous Given 1/14/22 1353)   etomidate (AMIDATE) injection 30 mg (30 mg Intravenous Given 1/14/22 1344)   rocuronium injection 100 mg (100 mg Intravenous Given 1/14/22 1345)       Disposition:  The patient  was transferred to cath lab.    Impression & Plan     Medical Decision Making:  This is a 56-year-old male who presents to the emergency room as a resuscitated cardiac arrest.  He had a shockable rhythm, and came back after the third shock, EMS gave no medications.  On arrival here he had spontaneous regulation but breathing was tenuous, and so the decision was made to intubate.  Please intubation note as above.  He was persistently hypoxic after the intubation, looks like he has bilateral groundglass opacities on the x-ray.  Considering aspiration, versus COVID versus other.  Regardless, his postresuscitation EKG showed clear STEMI, he was transferred to the Cath Lab.  Emergent medications were given per STEMI protocol, patient was not shocked and sedation was managed with propofol here in the emergency room prior to transfer.  Cath Lab activation is the next step in his management, and he was transferred to the Cath Lab under the care of the cardiologist    Critical Care Time: was 35 minutes for this patient excluding procedures    Diagnosis:    ICD-10-CM    1. ST elevation myocardial infarction (STEMI), unspecified artery (H)  I21.3    2. Cardiac arrest (H)  I46.9 Cardiac Catheterization     Cardiac Catheterization        Scribe Disclosure:  I, Michael Beverly, am serving as a scribe at 1:39 PM on 1/14/2022 to document services personally performed by Chilo Fitch MD based on my observations and the provider's statements to me.          Chilo Fitch MD  01/14/22 [id number]

## 2022-01-14 NOTE — ED TRIAGE NOTES
Pt arrives via EMS from home d/t unresponsiveness. Pt's wife started CPR and call EMS. Fire started CPR and delivered 2 shocks, EMS delivered 1 shock. No meds given. ROSC achieved. Pt arrives with nasal airway and breathing on own. Endtital 35-55 endtital. 18 g R and L AC. . Wife reports CP since yesterday around 1500. EKG STEMI/tachy.

## 2022-01-14 NOTE — PLAN OF CARE
Emergent coronary angiogram performed with intervention.  Thrombectomy and stent placed to prox LAD.  Pt tolerated procedure well and AVSS throughout.  Pt recovered in procedure room until arrival of EMS.  Bedside report to EMS with mutual assessment of sheath placement and limb integrity.  Report called to receiving nurse at [facility] ICU.  Pts wife at bedside and updated with pt status and POC.  EMS left with pt at 1713 with no further questions.

## 2022-01-14 NOTE — CONSULTS
Consult Date: 01/14/2022    REASON FOR CONSULTATION:  Out-of-hospital cardiac arrest due to acute anterior ST elevation MI.    HISTORY OF PRESENT ILLNESS:  The patient is a 56-year-old gentleman with history of hypertension, hyperlipidemia and nicotine dependence, who suffered an out-of-hospital cardiac arrest.  He was at home today, when he became unresponsive.  His wife started CPR and called 911.  When EMS arrived, the patient had a shockable rhythm.  He was defibrillated 3 times, with a return of ROSC.  The patient was subsequently transferred to [facility] Emergency Department by EMS.  When he arrived to the ER here, he was in respiratory distress and was emergently intubated.  Electrocardiogram, which I reviewed, revealed impressive ST elevation anteriorly.  Catheterization laboratory was subsequently activated.  The patient was brought to the cardiac catheterization lab emergently.    Per report, the patient complained of chest pain starting yesterday afternoon.  He has no known coronary artery disease, but has the aforementioned risk factors.    ALLERGIES:  PENICILLIN.    HOME MEDICATIONS:    1.  Atorvastatin 20 mg daily.  2.  Hydrochlorothiazide 25 mg daily.  3.  Norvasc 10 mg daily.    PAST MEDICAL HISTORY:    1.  Hypertension.  2.  Hyperlipidemia.  3.  Nicotine dependence.    SOCIAL HISTORY:  Smoker.    REVIEW OF SYSTEMS:  Unable to perform, as the patient is intubated and mechanically ventilated.    PHYSICAL EXAMINATION:  GENERAL:  He is intubated and mechanically ventilated.  LUNGS:  Clear anteriorly.  HEART:  Regular rhythm, no murmurs appreciated.  ABDOMEN:  Distended, soft and nontender.  EXTREMITIES:  No edema noted.  NEUROLOGIC:  Unable to perform secondary to perform.  VESSELS:  2+ radial, 2+ femoral.    ASSESSMENT AND PLAN:  A 56-year-old gentleman who suffered an out-of-hospital cardiac arrest due to acute anterior ST elevation myocardial infarction.  He underwent emergent coronary  angiography, which revealed occluded proximal LAD.  This was successfully treated with aspiration thrombectomy and stenting with a single drug-eluting stent.    The patient remained hemodynamically stable throughout the procedure.  He remains intubated and sedated.  He will be transferred to [facility] Intensive Care Unit for further evaluation and care.    Since the patient's downtime was unknown, a cooling catheter was placed.    I appreciate the opportunity to participate in his care.    Leo Gallegos MD        D: 2022   T: 2022   MT: [facility]    Name:     CHRISTINE CORTES  MRN:      [id number]        Account:      [id number]   :      1965           Consult Date: 2022     Document: [id number]

## 2022-01-15 PROBLEM — I46.9 CARDIAC ARREST (H): Status: ACTIVE | Noted: 2022-01-01

## 2022-01-15 NOTE — H&P
Critical Care Progress Note      01/14/2022    Name: Jonathan De La Rosa MRN#: [id number]   Age: 56 year old YOB: 1965     \[facility]\""tl Day# 0  ICU DAY #    MV DAY #             Problem List:   Acute respiratory failure  Status postcardiac arrest  STEMI  Postcardiac arrest shock  Leukocytosis  Acute kidney injury versus CKD.  Unknown baseline.  History of hypertension and hyperlipidemia    Clinically Significant Risk Factors Present on Admission                          Summary/Hospital Course:   55 yo male with hx of HTN and HLD. Per wife he developed chest pain on 1/13 at noon. Today he collapsed at home and his wife started CPR. When EMS arrived he had a shockable rhythm and shocked x2 at home and once en route to the hospital.  He was taken to the cath lab after EKG showed an anterior STEMI.   A clot was extracted from the proximal LAD followed by a JUAN JOSE placement.    He was started on TTM and transferred to [facility]        Assessment and plan :        I have personally reviewed the daily labs, imaging studies, cultures and discussed the case with referring physician and consulting physicians.     My assessment and plan by system for this patient is as follows:    Neurology/Psychiatry:   Sedated with propofol and fentanyl. Use midazolam if needed.   RASS goal -4 to -5  TTM  Neurology consult.  CT head unremarkable.    Cardiovascular:   STEMI with cardiac arrest.  S/p JUAN JOSE in proximal LAD.    TTM with goal temp 34 degrees Celsius    Antiplatelet therapy per cardiology, currently on Cangleror      Pulmonary/Ventilator Management:   Acute respiratory failure.   CXR clear.   Check ABG and adjust the vent.    GI and Nutrition :   NPO  PPI    Renal/Fluids/Electrolytes:   RICHELLE vs CKD. Unknown baseline.    - monitor function and electrolytes as needed with replacement per ICU protocols.  - generally avoid nephrotoxic agents such as NSAID, IV contrast unless specifically required  - adjust medications as needed for  renal clearance  - follow I/O's as appropriate.    Endocrine:   - ICU insulin protocol, goal sugar <180      ICU Prophylaxis:   1. DVT: mechanical  2. VAP: HOB 30 degrees, chlorhexidine rinse  3. Stress Ulcer: PPI  4. Restraints: Nonviolent soft two point restraints required and necessary for patient safety and continued cares and good effect as patient continues to pull at necessary lines, tubes despite education and distraction. Will readdress daily.     Category: Non-violent   Type of Restraint: Soft limb x2.   Behavior: Pulling at IVand godinez catheter tubings.   Root cause of behavior: Critical illness.   Less-restrictive methods that have failed: Redirection, reorientation. 1:1 NA at bedside.   Response to restraint: Not actively pulling atcurrent restraints.   Criteria for release from restraint: Responds to redirection. Leaves medical devices in place.                 Key Medications:       sodium chloride 0.9%  1,000 mL Intravenous Once     artificial tears   Both Eyes Q8H     chlorhexidine  15 mL Mouth/Throat Q12H     levETIRAcetam  1,000 mg Intravenous Q12H     [START ON 1/15/2022] pantoprazole  40 mg Per Feeding Tube QAM AC    Or     [START ON 1/15/2022] pantoprazole (PROTONIX) IV  40 mg Intravenous QAM AC     piperacillin-tazobactam  3.375 g Intravenous Once    Followed by     [START ON 1/15/2022] piperacillin-tazobactam  3.375 g Intravenous Q8H       [Held by provider] EPINEPHrine       fentaNYL       - MEDICATION INSTRUCTIONS -       - MEDICATION INSTRUCTIONS -       midazolam       norepinephrine 0.05 mcg/kg/min (01/14/22 1934)     propofol (DIPRIVAN) infusion       vasopressin                 Physical Examination:   Pulse:  [109-149] 109  Resp:  [8-33] 25  BP: (147-200)/(100-131) 151/100  MAP:  [119 mmHg] 119 mmHg  Arterial Line BP: (164)/(98) 164/98  FiO2 (%):  [75 %-80 %] 75 %  SpO2:  [90 %-100 %] 100 %  No intake or output data in the 24 hours ending 01/14/22 2024  Wt Readings from Last 4  Encounters:   01/14/22 70 kg (154 lb 5.2 oz)     Arterial Line BP: (164)/(98) 164/98  MAP:  [119 mmHg] 119 mmHg  BP - Mean:  [116-157] 119  FiO2 (%): 75 %  Resp: 25    Recent Labs   Lab 01/14/22  1537 01/14/22  1350 01/14/22  1349   PH  --  7.16*  --    PCO2 45  --   --    HCO3 20*  --   --    O2PER  --   --  100       GEN: no acute distress   HEENT: head ncat, sclera anicteric, OP patent, trachea midline   PULM: unlabored synchronous with vent, clear anteriorly    CV/COR: RRR S1S2 no gallop,  No rub, no murmur  ABD: soft nontender, hypoactive bowel sounds, no mass  EXT:  No edema  NEURO: sedated  SKIN: no obvious rash  LINES: clean, dry intact         Data:   All data and imaging reviewed     ROUTINE ICU LABS (Last four results)  CMP  Recent Labs   Lab 01/14/22  1349      POTASSIUM 3.2*   CHLORIDE 108   CO2 15*   ANIONGAP 17*   *   BUN 20   CR 1.43*   GFRESTIMATED 58*   NATACHA 8.9   MAG 2.7*   PROTTOTAL 7.3   ALBUMIN 3.6   BILITOTAL 0.6   ALKPHOS 148   *   *     CBC  Recent Labs   Lab 01/14/22  1349   WBC 14.6*   RBC 5.43   HGB 16.5   HCT 50.6   MCV 93   MCH 30.4   MCHC 32.6   RDW 13.3        INR  Recent Labs   Lab 01/14/22  1349   INR 1.04     Arterial Blood Gas  Recent Labs   Lab 01/14/22  1537 01/14/22  1350 01/14/22  1349   PH  --  7.16*  --    PCO2 45  --   --    HCO3 20*  --   --    O2PER  --   --  100       All cultures:  No results for input(s): CULT in the last 168 hours.  Recent Results (from the past 24 hour(s))   XR Chest Port 1 View    Narrative    CHEST ONE VIEW  1/14/2022 2:01 PM     HISTORY: Post-intubation.    COMPARISON: None.      Impression    IMPRESSION: Endotracheal tube tip 3.7 cm from the david. Enteric tube  tip in the distal esophagus, consider advancement given the distention  of the stomach. Mild-moderate interstitial and groundglass infiltrates  bilaterally. This has an upper lobe predominance.     TRINY PRIETO MD         SYSTEM ID:  [id number]   Cardiac  Catheterization    Narrative    1. Out of hospital cardiac arrest due to acute anterior STEMI  2. Status post successful IVUS-guided PCI with JUAN JOSE placement in the   proximal LAD   XR Chest Port 1 View    Narrative    EXAM: XR CHEST PORT 1 VIEW  LOCATION: [facility]  DATE/TIME: 1/14/2022 7:25 PM    INDICATION: Endotracheal tube positioning.  COMPARISON: 01/14/2022.      Impression    IMPRESSION:     ET tube tip roughly 7 cm above the david, near the level of the clavicular heads. Enteric tube tip at the distal esophagus; recommend at least 10 cm of advancement.     Hypoexpanded lungs. No significant change of bilateral opacities. No pleural effusion or pneumothorax. Stable cardiomediastinal silhouette.       CT Head w/o Contrast    Narrative    EXAM: CT HEAD W/O CONTRAST  LOCATION: [facility]  DATE/TIME: 1/14/2022 8:07 PM    INDICATION: Cardiac arrest.  COMPARISON: None.  TECHNIQUE: Routine CT Head without IV contrast. Multiplanar reformats. Dose reduction techniques were used.    FINDINGS:  INTRACRANIAL CONTENTS: No evidence of acute intracranial hemorrhage or mass effect. Brain attenuation morphology are normal. The ventricles and sulci are normal for age. Normal gray-white matter differentiation. The basilar cisterns are patent.    VISUALIZED ORBITS/SINUSES/MASTOIDS: The globes are unremarkable. The partially imaged paranasal sinuses, mastoid air cells and middle ear cavities are unremarkable.     BONES/SOFT TISSUES: The visualized skull base and calvarium are unremarkable.      Impression    IMPRESSION:    1.  No evidence of acute intracranial hemorrhage or mass effect.         Billing: This patient is critically ill: Yes. Total critical care time today 45 min. S/p cardiac arrest, TTM

## 2022-01-15 NOTE — PLAN OF CARE
Problem: Adult Inpatient Plan of Care  Goal: Plan of Care Review  Outcome: No Change  Goal: Patient-Specific Goal (Individualized)  Outcome: No Change  Goal: Absence of Hospital-Acquired Illness or Injury  Outcome: No Change  Intervention: Identify and Manage Fall Risk  Recent Flowsheet Documentation  Taken 1/15/2022 1600 by Ermelinda Helms RN  Safety Promotion/Fall Prevention:    bed alarm on    increased rounding and observation    patient and family education    safety round/check completed  Taken 1/15/2022 1200 by Ermelinda Helms RN  Safety Promotion/Fall Prevention:    bed alarm on    increased rounding and observation    patient and family education    safety round/check completed  Taken 1/15/2022 0803 by Ermelinda Helms RN  Safety Promotion/Fall Prevention:    bed alarm on    increased rounding and observation    patient and family education    safety round/check completed  Intervention: Prevent Skin Injury  Recent Flowsheet Documentation  Taken 1/15/2022 1600 by Ermelinda Helms RN  Body Position:    turned    supine  Taken 1/15/2022 1200 by Ermelinda Helms RN  Body Position:    turned    supine  Taken 1/15/2022 0803 by Ermelinda Helms RN  Body Position:    turned    supine  Intervention: Prevent and Manage VTE (Venous Thromboembolism) Risk  Recent Flowsheet Documentation  Taken 1/15/2022 1600 by Ermelinda Helms RN  VTE Prevention/Management: anticoagulant therapy adjusted  Taken 1/15/2022 1200 by Ermelinda Helms RN  VTE Prevention/Management: anticoagulant therapy adjusted  Taken 1/15/2022 0803 by Ermelinda Helms RN  VTE Prevention/Management: anticoagulant therapy adjusted  Goal: Optimal Comfort and Wellbeing  Outcome: No Change  Goal: Readiness for Transition of Care  Outcome: No Change     Problem: Risk for Delirium  Goal: Optimal Coping  Outcome: No Change  Goal: Improved Behavioral Control  Outcome: No Change  Goal: Improved Attention and Thought Clarity  Outcome: No Change  Goal:  Improved Sleep  Outcome: No Change     Problem: Adjustment to Illness (Targeted Temperature Management)  Goal: Optimal Response to Life-Threatening Event  Outcome: No Change     Problem: Dysrhythmia (Targeted Temperature Management)  Goal: Stable Cardiac Rate and Rhythm  Outcome: No Change     Problem: Hemodynamic Instability (Targeted Temperature Management)  Goal: Effective Tissue Perfusion  Outcome: No Change     Problem: Infection (Targeted Temperature Management)  Goal: Absence of Infection Signs and Symptoms  Outcome: No Change     Problem: Communication Impairment (Mechanical Ventilation, Invasive)  Goal: Effective Communication  Outcome: No Change     Problem: Device-Related Complication Risk (Mechanical Ventilation, Invasive)  Goal: Optimal Device Function  Outcome: No Change     Problem: Inability to Wean (Mechanical Ventilation, Invasive)  Goal: Mechanical Ventilation Liberation  Outcome: No Change     Problem: Nutrition Impairment (Mechanical Ventilation, Invasive)  Goal: Optimal Nutrition Delivery  Outcome: No Change     Problem: Skin and Tissue Injury (Mechanical Ventilation, Invasive)  Goal: Absence of Device-Related Skin and Tissue Injury  Outcome: No Change     Problem: Ventilator-Induced Lung Injury (Mechanical Ventilation, Invasive)  Goal: Absence of Ventilator-Induced Lung Injury  Outcome: No Change  Intervention: Prevent Ventilator-Associated Pneumonia  Recent Flowsheet Documentation  Taken 1/15/2022 1600 by Ermelinda Helms, RN  Oral Care: swabbed with antiseptic solution  Head of Bed (HOB) Positioning: HOB at 30 degrees  Taken 1/15/2022 1200 by Ermelinda Helms, RN  Oral Care: swabbed with antiseptic solution  Head of Bed (HOB) Positioning: HOB at 30 degrees  Taken 1/15/2022 0803 by Ermelinda Helms, RN  Oral Care: swabbed with antiseptic solution  Head of Bed (HOB) Positioning: HOB at 30 degrees     Patient having rhythmic eye movements and jaw clenching as well as asynchronous/gasping  breathing during initial assessment. Fentanyl bolus given with improvement. This afternoon patient had rhythmic eye movements, Fentanyl bolus given with improvement. Given x2 doses of meperidine, x1 dose of vecuronium, and fentanyl boluses for shivering.     Patient was in and out of sinus rhythm/accelerated junctional rhythm with frequent PVCs. Patient had x3 runs of V-tach this shift.    50 ml of water with medication infused through OG for morning medications. Patient vomited within the hour after medications. MD notified, no medications through OG tube. OG to low intermittent suction. Dark brown/dark red output, 240 ml output this shift.    [place] with yellow, cloudy output.

## 2022-01-15 NOTE — CONSULTS
[facility]  Neurology Consultation    Patient Name:  Jonathan De La Rosa  MRN:  [id number]    :  1965  Date of Service:  January 15, 2022  Primary care provider:  Tha Mandel      Neurology consultation service was asked to see Jonathan De La Rosa by Dr. Romo to evaluate possible anoxic injury.    Chief Complaint:  Cardiac arrest    History of Present Illness:   Jonathan De La Rosa is a 56 year old male with no known previous past medical history who presents after cardiac arrest.  Per wife he had been having chest pain for about a day.  They were on their way to go to the hospital when he suddenly collapsed.  Wife started CPR and estimates that she did CPR for 8-1/2 minutes prior to EMS arriving.  When EMS arrived he had a shockable rhythm and was shocked three times.  He subsequently was found to have profound ST elevation and was taken to Cath Lab and found to have an occlusion of the proximal LAD that was treated with drug-eluting stent.  Patient was initiated on cooling protocol and transferred to [facility] ICU.  Unclear total time to ROSC.  Neurology consulted due to concern for anoxic brain injury.  Initial head CT read is normal patient is unable to answer any review of systems questions due to intubated and sedated state.    Per nursing he did have events when Versed was decreased of right facial twitching, and neck twitching.  They also report bilateral eye twitching and abnormal eye movements.  Subsequently was started on Keppra and propofol was added.    ROS as above    PMH  Hypertension, hyperlipidemia  Medications   I have personally reviewed the patient's medication list.     Allergies  I have personally reviewed the patient's allergy list.     Social History  Not current smoker.    Family History    No family history of seizures      Physical Examination   Vitals: BP (!) 151/100   Pulse 61   Temp (!) 94.8  F (34.9  C) (Bladder)   Resp 20   Ht  "1.854 m (6' 1\")   Wt 94.6 kg (208 lb 8.9 oz)   SpO2 100%   BMI 27.52 kg/m    General: Lying in bed, intubated and sedated.  Head: NC/AT  Eyes: no icterus,  Cardiac: Extremities warm, no edema  Respiratory: Intubated  GI: Nondistended  Skin: No rashes seen on exposed skin, some bruising noted  Psych: Unable to assess  Neuro: Done well on low doses of Versed, propofol, and fentanyl.  He also cooled to 35 degrees.  Intubated, sedated, no clear pupillary reaction.  Extraocular muscles not intact doll's eyes.  Does gag and cough with deep suctioning.  No response to noxious stimuli in upper and lower extremities.  No seizure, or seizure-like activity noted during my examination.      Investigations   I have personally reviewed pertinent labs, tests, and radiological imaging. Discussion of notable findings is included under Impression.     CT Head:INTRACRANIAL CONTENTS: No evidence of acute intracranial hemorrhage or mass effect. Brain attenuation morphology are normal. The ventricles and sulci are normal for age. Normal gray-white matter differentiation. The basilar cisterns are patent.     VISUALIZED ORBITS/SINUSES/MASTOIDS: The globes are unremarkable. The partially imaged paranasal sinuses, mastoid air cells and middle ear cavities are unremarkable.      BONES/SOFT TISSUES: The visualized skull base and calvarium are unremarkable.                                                                      IMPRESSION:    1.  No evidence of acute intracranial hemorrhage or mass effect.    Was patient transferred from outside hospital? Yes  [facility]    Impression  #Cardiac arrest  #Seizure like activity  #Possible anoxic brain injury    Mr. De La Rosa is a 56-year-old male without significant previous past medical history except for hypertension who presented with cardiac arrest.  Found to have a LAD occlusion.  Neurology consulted for concern for anoxic injury and seizures.  At this time his exam is poor but certainly " confounded by sedation and hypothermia protocol and cannot be used to determine long-term prognosis.  Hypothermia protocol should and this evening.  It is unclear to me if the movements in question are seizures, or post anoxic myoclonus which would be an negative prognostic indicator.  We will plan on getting EEG tomorrow after hypothermic protocol over and team can hopefully wean sedation for EEG.  Exam not significantly improving will need an MRI approximately 5 days after the arrest to look for evidence of anoxic injury.  There is no obvious anoxic injury in the initial head CT    Recommendations  -Continue Keppra, propofol, Versed  -EEG tomorrow after hypothermia protocol, please try to limit sedation as much as possible for EEG  -MRI brain approximately 4-5 days after rest if mental status not significantly improving    Thank you for involving Neurology in the care of Jonathan De La Rosa.      Wilman James,

## 2022-01-15 NOTE — PROGRESS NOTES
Pt severely bradycardic. Norepi/vaso requirements going up.  Stop propofol. Start dopamine.  Increase TT to 35 Celsius.   Might need to stop amio and instead use lidocaine.     40min critical care time managing hemodynamics

## 2022-01-15 NOTE — PHARMACY-ADMISSION MEDICATION HISTORY
Pharmacy Note - Admission Medication History    Pertinent Provider Information:  Patient has not been taking any previously prescribed medications for months.     Medications he was supposed to be taking are:  1. Hydrochlorothiazide 25 mg PO Daily  2. Atorvastatin 20 mg PO Daily  3. Amlodipine 10 mg PO Daily   ______________________________________________________________________    Prior To Admission (PTA) med list completed and updated in EMR.       PTA Med List   Medication Sig Note Last Dose     acetaminophen (TYLENOL) 500 MG tablet Take 500-1,000 mg by mouth every 6 hours as needed for fever or pain  Unknown at PRN     aspirin (ASA) 81 MG chewable tablet Take 81 mg by mouth once 1/15/2022: Took x1 with onset of sx at home. DOES NOT TAKE daily/regularly. 1/14/2022 at x1     ibuprofen (ADVIL/MOTRIN) 200 MG tablet Take 400 mg by mouth every 6 hours as needed for moderate pain  1/14/2022 at x1       Information source(s): Family member  Method of interview communication: in-person    Summary of Changes to PTA Med List  New: all    Family was asked about OTC/herbal products specifically.  PTA med list reflects this.    In the past week, patient estimated taking medication this percent of the time:  less than 50% due to other.    Allergies were reviewed, assessed, and updated with the family member.      Patient does not use any multi-dose medications prior to admission.    The information provided in this note is only as accurate as the sources available at the time of the update(s).    Thank you,  April Wilson, [facility]  1/15/2022 12:26 PM

## 2022-01-15 NOTE — PROGRESS NOTES
Patient remains vented/full support. Ventilation and oxygenation are adequate. Pt is transported to/from CT scan without incident. Current vent settings: AC AC 20 500 50% 8. sats 99. RT following.    Elder Meade, RT

## 2022-01-15 NOTE — CONSULTS
Care Management Initial Consult    General Information  Assessment completed with: Children, dtr-Jennifer  Type of CM/SW Visit: Initial Assessment    Primary Care Provider verified and updated as needed:  (has not been to pcp in 2 years)   Readmission within the last 30 days:           Advance Care Planning: Advance Care Planning Reviewed:  (Does not have HCD)          Communication Assessment  Patient's communication style:               Cognitive  Cognitive/Neuro/Behavioral: .WDL except,all  Level of Consciousness: sedated  Arousal Level: unresponsive  Orientation: other (see comments) (sedated)  Mood/Behavior: other (see comments) (IRMA)  Best Language: 0 - No aphasia (IRMA)  Speech: endotracheal tube    Living Environment:   People in home: spouse     Current living Arrangements: house      Able to return to prior arrangements: yes       Family/Social Support:  Care provided by: self  Provides care for: no one  Marital Status:   Wife,Children          Description of Support System: Supportive,Involved         Current Resources:   Patient receiving home care services: No     Community Resources: None  Equipment currently used at home: none  Supplies currently used at home: None    Employment/Financial:  Employment Status: employed part-time        Financial Concerns: insurance, none   Referral to Financial Counselor: Yes       Lifestyle & Psychosocial Needs:  Social Determinants of Health     Tobacco Use: Not on file   Alcohol Use: Not on file   Financial Resource Strain: Not on file   Food Insecurity: Not on file   Transportation Needs: Not on file   Physical Activity: Not on file   Stress: Not on file   Social Connections: Not on file   Intimate Partner Violence: Not on file   Depression: Not on file   Housing Stability: Not on file       Functional Status:  Prior to admission patient needed assistance:   Dependent ADLs:: Independent  Dependent IADLs:: Independent             Additional  Information:  Assessment completed with patient's daughter, Jennifer. Patient lives with spouse in private residence. He is independent with ADLs and IADLs. He ambulates without devices, drives and works part time as a .  Patient is uninsured. Referral to Financial SW. Jennifer would like to be main contact for this but notified primary contact for financials may need to be spouse. She states understanding. Spouse and Jennifer are primary family contacts.    Final discharge plan pending progression and recommendations.    Sherri Colindres RN

## 2022-01-15 NOTE — PLAN OF CARE
Problem: Body Temperature Regulation (Targeted Temperature Management)  Goal: Target Body Temperature Maintained  1/15/2022 0448 by Joyce Morrison, RN  Outcome: No Change   Target temp 35 C.     Problem: Dysrhythmia (Targeted Temperature Management)  Goal: Stable Cardiac Rate and Rhythm  1/15/2022 0448 by Joyce Morrison RN  Outcome: No Change   Frequent PVCs. Several runs of >10 beats of Vtach. Amiodorone gtt started.     Patient became bradycardic with HR as low as 39. MD at bedside. Dopamine gtt started.     Problem: Hemodynamic Instability (Targeted Temperature Management)  Goal: Effective Tissue Perfusion  1/15/2022 0448 by Joyce Morrison, RN  Outcome: No Change   BP labile. Titrated Levophed and Dopamine to achieve MAP > 65.

## 2022-01-15 NOTE — CONSULTS
Impression and Plan     Assessment:  1. VT/VF cardiac arrest s/p ROSC and PCI to proximal LAD. With recurrent NSVT while in hypothermia protocol after revascularization.  2. Coronary artery disease with acute anterior ST elevation myocardial infarction  3. Acute respiratory failure due to cardiac arrest and sedation, cooling protocol  4. Acute circulatory shock, likely cardiogenic and vasoplegic from his STEMI, cardiac arrest, sedation, and hypothermia    Plan:    Start aspirin and load brilinta    Amiodarone bolus and infusion    If ectopy continues would consider rewarming    Echocardiogram tomorrow    Atorvastatin 80 mg at bedtime    Will follow.    Primary Cardiologist: Dr. Gallegos ([facility])    I spent 42 minutes of critical care time caring for this patient (2130 to 2212).      History of Present Illness      History obtained from chart review. Pt unable to provide history as he is sedated and on mechanical ventilation.    Mr. Jonathan De La Rosa is a 56 year old male with a history of hypertension, hyperlipidemia, and nicotine abuse who was admitted via EMS after suffering an out of hospital VT cardiac arrest. According to the record he had been experiencing chest pain for approximately 24 hours before he suddenly became unresponsive. His wife was present and initiated CPR and called 911. He had a shockable rhythm on EMS arrival and he was treated with defibrillation 3 times before ROSC. He was then transferred to [facility] where he was found to have profound ST elevation in anterior leads and taken to the cath lab. He was found to have acute thrombotic occlusion of the proximal LAD that was treated with PCI and placement of a JUAN JOSE. He was initiated on cooling protocol and transferred to [facility] ICU. He was started on cangrelor but no loaded with brilinta or plavix.    Here, he remains cooled, intubated and sedated. He has had a 17 beat run of NSVT. He is on norepinephrine and  vasopressin for blood pressure support.       Review of Systems:  Unable to provide a ROS due to sedation.    Cardiac Diagnostics     ECG: Personally reviewed and interpreted: sinus rhythm with SELENA of anterior leads, improved from earlier today.    Telemetry (personally reviewed): sinus rhythm. A 17 beat run of NSVT occurred around 2030.    Most recent:    Cardiac Cath (results reviewed): 1/14/22  Left Main   The vessel was visualized by selective angiography and is moderate in size. There was 0% vessel disease.   Left Anterior Descending   Prox LAD lesion is 100% stenosed. Culprit lesion. The lesion is thrombotic.   Mid LAD lesion is 30% stenosed.   Dist LAD lesion is 95% stenosed. Lesion is at the apical LAD. Vessel is small beyond the lesion   Left Circumflex   First Obtuse Marginal Branch   1st Mrg lesion is 20% stenosed.   Right Coronary Artery   Prox RCA lesion is 25% stenosed.   Dist RCA lesion is 70% stenosed. The lesion is discrete.         Medical History  Surgical History Family History Social History      Hypertension  Hyperlipidemia  Tobacco abuse Unable to obtain No family history on file.   Unable to obtain     Social History     Socioeconomic History     Marital status:      Spouse name: Not on file     Number of children: Not on file     Years of education: Not on file     Highest education level: Not on file   Occupational History     Not on file   Tobacco Use     Smoking status: Not on file     Smokeless tobacco: Not on file   Substance and Sexual Activity     Alcohol use: Not on file     Drug use: Not on file     Sexual activity: Not on file   Other Topics Concern     Not on file   Social History Narrative     Not on file     Social Determinants of Health     Financial Resource Strain: Not on file   Food Insecurity: Not on file   Transportation Needs: Not on file   Physical Activity: Not on file   Stress: Not on file   Social Connections: Not on file   Intimate Partner Violence: Not on  file   Housing Stability: Not on file             Physical Examination   VITALS: BP (!) 151/100   Pulse 77   Temp 97.5  F (36.4  C) (Bladder)   Resp 20   SpO2 99%   BMI: There is no height or weight on file to calculate BMI.  Wt Readings from Last 3 Encounters:   01/14/22 70 kg (154 lb 5.2 oz)       Intake/Output Summary (Last 24 hours) at 1/14/2022 2154  Last data filed at 1/14/2022 1900  Gross per 24 hour   Intake --   Output 800 ml   Net -800 ml       General Appearance:  Intubated, sedated, paralyzed   Head:  Normocephalic, without obvious abnormality, atraumatic   Eyes:  PERRL, conjunctiva/corneas clear, EOM's intact   Ears:  Normal external ear canals bilaterally   Nose: Nares normal, septum midline, no drainage   Throat: ETT in place   Neck: Supple. Unable to assess JVD on positive pressure ventilation.   Lungs:   Clear to auscultation bilaterally, respirations unlabored   Chest Wall:  No tenderness or deformity   Heart:  Regular rate and rhythm, S1, S2 normal,no murmur, rub or gallop   Abdomen:   Soft, non-tender   Extremities: Extremities normal, atraumatic, no cyanosis or edema   Skin: Cool to touch   Psychiatric: sedated   Neurologic: Sedated and paralyzed.            Imaging      Chest x-ray: IMPRESSION:      ET tube tip roughly 7 cm above the david, near the level of the clavicular heads. Enteric tube tip at the distal esophagus; recommend at least 10 cm of advancement.      Hypoexpanded lungs. No significant change of bilateral opacities. No pleural effusion or pneumothorax. Stable cardiomediastinal silhouette       Lab Results    Chemistry/lipid CBC Cardiac Enzymes/BNP/TSH/INR   No results for input(s): CHOL, HDL, LDL, TRIG, CHOLHDLRATIO in the last 10104 hours.  No results for input(s): LDL in the last 38778 hours.  Recent Labs   Lab Test 01/14/22 2114      POTASSIUM 4.3   CHLORIDE 111*   CO2 17*   *   BUN 23*   CR 1.38*   GFRESTIMATED 60*   NATACHA 8.0*     Recent Labs   Lab Test  01/14/22 2114 01/14/22  1349   CR 1.38* 1.43*     No results for input(s): A1C in the last 45084 hours.       Recent Labs   Lab Test 01/14/22  1349   WBC 14.6*   HGB 16.5   HCT 50.6   MCV 93        Recent Labs   Lab Test 01/14/22  1349   HGB 16.5    No results for input(s): TROPONINI in the last 75885 hours.  No results for input(s): BNP, NTBNPI, NTBNP in the last 74703 hours.  No results for input(s): TSH in the last 47402 hours.  Recent Labs   Lab Test 01/14/22  1349   INR 1.04           Current Inpatient Scheduled Medications   Scheduled Meds:    sodium chloride 0.9%  1,000 mL Intravenous Once     artificial tears   Both Eyes Q8H     [START ON 1/15/2022] aspirin  81 mg Per OG Tube Daily     atorvastatin  80 mg Per OG Tube QPM     chlorhexidine  15 mL Mouth/Throat Q12H     levETIRAcetam  1,000 mg Intravenous Q12H     [START ON 1/15/2022] pantoprazole  40 mg Per Feeding Tube QAM AC    Or     [START ON 1/15/2022] pantoprazole (PROTONIX) IV  40 mg Intravenous QAM AC     [START ON 1/15/2022] piperacillin-tazobactam  3.375 g Intravenous Q8H     [START ON 1/15/2022] ticagrelor  90 mg Oral BID     Continuous Infusions:    amiodarone       [START ON 1/15/2022] amiodarone       [Held by provider] EPINEPHrine Stopped (01/14/22 2034)     fentaNYL 25 mcg/hr (01/14/22 1930)     - MEDICATION INSTRUCTIONS -       - MEDICATION INSTRUCTIONS -       midazolam 1 mg/hr (01/14/22 1930)     norepinephrine 0.05 mcg/kg/min (01/14/22 1934)     propofol (DIPRIVAN) infusion 50 mcg/kg/min (01/14/22 2103)     vasopressin 2.4 Units/hr (01/14/22 2101)       No current outpatient medications on file.          Medications Prior to Admission   Prior to Admission medications    Not on File

## 2022-01-15 NOTE — PROGRESS NOTES
Impression and Plan     Impression:   1. VT/VF cardiac arrest s/p ROSC and PCI to proximal LAD. With recurrent NSVT while in hypothermia protocol after revascularization. Total down time approximately 15 minutes based on discussion with wife who witnessed arrest and promptly started CPR.  2. Coronary artery disease with acute anterior ST elevation myocardial infarction  3. Acute respiratory failure due to cardiac arrest and sedation, cooling protocol  4. Acute circulatory shock, likely cardiogenic and vasoplegic from his STEMI, cardiac arrest, sedation, and hypothermia  5. Possible anoxic brain injury    Plan:    Adjust target temperature to 36 C    Continue amiodarone for now for rhythm stability    Blood pressure support with MAP goal of 65 mmHg    Start cangrelor for antiplatelet medication as he is not tolerating medications through OG    Discussed with Dr. Mckay and nursing staff    Total critical care time: 40 minutes (1105 - 1145)    Primary Cardiologist: Dr. El Mai     Developed bradycardia overnight. Started on dopamine. Reportedly had some seizure activity when not fully paralyzed. Vomited medications given through OG tube.     Cardiac Diagnostics   Telemetry (personally reviewed): sinus rhythm, sinus bradycardia, and junctional rhythm.     ECG (personally reviewed and interpreted): sinus rhythm with anteroseptal infarct, subacute      Cardiac Cath (results reviewed): 1/14/22  Left Main   The vessel was visualized by selective angiography and is moderate in size. There was 0% vessel disease.   Left Anterior Descending   Prox LAD lesion is 100% stenosed. Culprit lesion. The lesion is thrombotic.   Mid LAD lesion is 30% stenosed.   Dist LAD lesion is 95% stenosed. Lesion is at the apical LAD. Vessel is small beyond the lesion   Left Circumflex   First Obtuse Marginal Branch   1st Mrg lesion is 20% stenosed.   Right Coronary Artery   Prox RCA lesion is 25% stenosed.   Dist RCA lesion is  "70% stenosed. The lesion is discrete.         Physical Examination       BP (!) 151/100   Pulse 62   Temp (!) 94.8  F (34.9  C) (Bladder)   Resp 20   Ht 1.854 m (6' 1\")   Wt 94.6 kg (208 lb 8.9 oz)   SpO2 100%   BMI 27.52 kg/m          Intake/Output Summary (Last 24 hours) at 1/15/2022 1101  Last data filed at 1/15/2022 1016  Gross per 24 hour   Intake 2311.61 ml   Output 1703 ml   Net 608.61 ml       General: intubated, sedated, paralyzed  HENT: external ears normal. Nares patent. Mucous membranes moist.  Eyes: perrla, extraocular muscles intact. No scleral icterus.   Neck: No JVD  Lungs: some ventilated breath sounds in anterior fields  COR: regular rate and rhythm, No murmurs, rubs, or gallops  Abd: nondistended, BS present  Extrem: No edema         Imaging      No new imaging    Lab Results   Lab Results   Component Value Date    BUN 25 (H) 01/15/2022     01/15/2022    CO2 15 (L) 01/15/2022       Lab Results   Component Value Date    WBC 24.1 (H) 01/15/2022    HGB 13.7 01/15/2022    HCT 40.6 01/15/2022    MCV 91 01/15/2022     01/15/2022       Lab Results   Component Value Date    INR 1.14 01/15/2022               Current Inpatient Scheduled Medications   Scheduled Meds:    sodium chloride 0.9%  1,000 mL Intravenous Once     artificial tears   Both Eyes Q8H     aspirin  300 mg Rectal Daily     atorvastatin  80 mg Per OG Tube QPM     chlorhexidine  15 mL Mouth/Throat Q12H     levETIRAcetam  1,000 mg Intravenous Q12H     pantoprazole (PROTONIX) IV  40 mg Intravenous QAM AC     piperacillin-tazobactam  3.375 g Intravenous Q8H     ticagrelor  90 mg Oral BID     Continuous Infusions:    amiodarone 0.5 mg/min (01/15/22 0438)     dextrose       DOPamine 2 mcg/kg/min (01/15/22 0430)     [Held by provider] EPINEPHrine Stopped (01/14/22 2034)     fentaNYL 100 mcg/hr (01/15/22 0956)     insulin regular 2 Units/hr (01/15/22 1058)     - MEDICATION INSTRUCTIONS -       - MEDICATION INSTRUCTIONS -       " midazolam 10 mg/hr (01/15/22 0640)     norepinephrine 0.23 mcg/kg/min (01/15/22 1101)     propofol (DIPRIVAN) infusion 10 mcg/kg/min (01/15/22 0940)     sodium chloride 100 mL/hr at 01/15/22 0253     vasopressin 2.4 Units/hr (01/15/22 1028)            Medications Prior to Admission   Prior to Admission medications    Not on File

## 2022-01-15 NOTE — PROGRESS NOTES
PULMONARY / CRITICAL CARE PROGRESS NOTE    Date / Time of Admission:  1/14/2022  6:29 PM    Assessment:     Jonathan De La Rosa is a 56 year old male with history of hypertension, hyperlipidemia, and nicotine abuse who was admitted via EMS after suffering an out of hospital [place] cardiac arrest. According to the record he had been experiencing chest pain for approximately 24 hours before he suddenly became unresponsive. His wife was present and initiated CPR and called 911. He had a shockable rhythm on EMS arrival and he was treated with defibrillation 3 times before ROSC. He was then transferred to [facility] where he was found to have profound ST elevation in anterior leads and taken to the cath lab. He was found to have acute thrombotic occlusion of the proximal LAD that was treated with PCI and placement of a JUAN JOSE. He was initiated on cooling protocol and transferred to [facility] ICU.     1. S/p VT/VFib cardiac arrest 1/14/22  Unclear downtime, EMS arrived, V fib on the field, s/p defibrillation x 3 prior to recovery of pulse and blood pressure. Transferred to [facility]. Intubated in ED.   Diagnosed with STEMI s/p coronary angiogram stent LAD.   Transferred to [facility]. Head CT scan was negative.   Hypothermia protocol started, due to hemodynamic instability, goal of temp was increased to 36 C.   2. Acute respiratory failure s/p intubation 1/14/22  3. Shock   4. STEMI   S/p coronary angiogram, stent LAD  5. NSVT   VT/V fib cardiac arrest secondary to STEMI.   Ongoing NSVT, likely reperfusion arrhthmia, continue amiodarone drip  6. Myoclonus movement of eyelids  Head CT scan was negative. Continue propofol and versed drip.   Keppra IV. Neurology consult, EEG was ordered  7. Dark gastric secretions  Could related to aspiration after traumatic intubation.   Gastric lavage and observe. H/H is stable. Continue PPI.   8. Acute kidney injury  9. Elevated LFTs  Related to hypoperfusion during cardiac  arrest  10. Tobacco user     Advance Directives:  Full code    Plan:   1. Titrate vent settings A/C 22/500/8/40%  2. Sedation to keep RASS -4 to -5, versed, propofol, fentanyl drip   3. Hypothermia protocol, due to hemodynamic instability Temp goal was increased from 34 to 36 degrees.   4. Keppra IV  5. EEG, Neurology consult  6. Pressors to keep MAP > 65, currently on NE, vasopressin and dopamine  7. Amiodarone drip   8. ASA, Ticagrelor, statins  9. Follow up echocardiogram  10. Cardiology following  11. Follow up sputum, blood cultures  12. Continue zosyn IV  13. Monitor kidney function  14. Supplement electrolytes   15. Monitor LFTs  16. NPO  17. PPI IV  18. Insulin drip  19. DVT prophylaxis , held due to hypothermia protocol     Please contact me if you have any questions.  Total critical care time, not including separately billable procedure time: 45 minutes  This patient had a high probability of imminent or life threatening deterioration due to acute respiratory failure which required my direct attention, intervention and personal management.     Neville Pastrana  Pulmonary / Critical Care  01/15/2022  8:12 AM      ICU DAILY CHECKLIST                         Can patient transfer out of MICU? no    FAST HUG:    Feeding:  Feeding: no.  Patient is receiving NPO    Duarte: yes  Analgesia/Sedation:yes  Versed, propofol, fentanyl drip   Thromboembolic prophylaxis: Yes; Mode:  SCDs  HOB>30:  Yes  Stress Ulcer Protocol Active: Yes; Mode: PPI  Glycemic Control: Any glucose > 180 no; Mode of Insulin Therapy: Sliding Scale Insulin    INTUBATED:  Can patient have daily waking:  No  Can patient have spontaneous breathing trial:  No    Restraints? yes    PHYSICAL THERAPY AND MOBILITY:  Can patient have PT and mobility trial: no  Activity: bedrest    Subjective:   HPI:  Jonathan De La Rosa is a 56 year old male with history of hypertension, hyperlipidemia, and nicotine abuse who was admitted via EMS after suffering an  out of hospital [place] cardiac arrest. According to the record he had been experiencing chest pain for approximately 24 hours before he suddenly became unresponsive. His wife was present and initiated CPR and called 911. He had a shockable rhythm on EMS arrival and he was treated with defibrillation 3 times before ROSC. He was then transferred to [facility] where he was found to have profound ST elevation in anterior leads and taken to the cath lab. He was found to have acute thrombotic occlusion of the proximal LAD that was treated with PCI and placement of a JUAN JOSE. He was initiated on cooling protocol and transferred to [facility] ICU.     Events overnight  - Hypothermia was initiated, Temp goal 34 degrees, become bradycardic, then junctional rhythm ,espisodes of NSVT. Temp goal was increased to 35 degrees.   - Cardiology evaluation, started on amiodarone drip.   - Nausea , vomiting, OG tube connected to suction, dark gastric content was aspirated.     Allergies: Patient has no allergy information on record.     MEDS:  Current Facility-Administered Medications   Medication     0.9% sodium chloride BOLUS     amiodarone (NEXTERONE) 900 mg in sodium chloride in non-PVC bag 0.9 % 500 mL infusion     artificial tears ophthalmic ointment     aspirin (ASA) chewable tablet 81 mg     atorvastatin (LIPITOR) tablet 80 mg     chlorhexidine (PERIDEX) 0.12 % solution 15 mL     dextrose 10% infusion     glucose gel 15-30 g    Or     dextrose 50 % injection 25-50 mL    Or     glucagon injection 1 mg     DOPamine 400 mg in dextrose 5% 250 mL (adult std) - premix - CENTRAL     [Held by provider] EPINEPHrine (ADRENALIN) 5 mg in sodium chloride 0.9 % 250 mL infusion CENTRAL     fentaNYL (PF) (SUBLIMAZE) injection 50 mcg     fentaNYL (SUBLIMAZE) 50 mcg/mL bolus from infusion pump 50 mcg     fentaNYL (SUBLIMAZE) infusion     insulin regular (MYXREDLIN) 1 unit/mL infusion     levETIRAcetam (KEPPRA) 1,000 mg in sodium chloride 0.9 % 100  "mL intermittent infusion     medication instruction     Medication Instructions     meperidine (DEMEROL) injection 25-50 mg     metoprolol (LOPRESSOR) injection 5 mg     midazolam (VERSED) drip - ADULT 100 mg/100 mL in NS (pre-mix)     midazolam (VERSED) injection 1-3 mg     naloxone (NARCAN) injection 0.2 mg    Or     naloxone (NARCAN) injection 0.4 mg    Or     naloxone (NARCAN) injection 0.2 mg    Or     naloxone (NARCAN) injection 0.4 mg     norepinephrine (LEVOPHED) 4 mg in  mL infusion PREMIX     pantoprazole (PROTONIX) 2 mg/mL suspension 40 mg    Or     pantoprazole (PROTONIX) IV push injection 40 mg     piperacillin-tazobactam (ZOSYN) 3.375 g vial to attach to  mL bag     potassium chloride 20 mEq in 50 mL intermittent infusion     propofol (DIPRIVAN) infusion     sodium chloride 0.9% infusion     ticagrelor (BRILINTA) tablet 90 mg     vasopressin (VASOSTRICT) 20 Units in sodium chloride 0.9 % 100 mL standard conc infusion     vecuronium (NORCURON) injection 10 mg     vecuronium (NORCURON) injection 4.85 mg       Objective:   VITALS:  BP (!) 151/100   Pulse 66   Temp (!) 96.4  F (35.8  C)   Resp 20   Ht 1.854 m (6' 1\")   SpO2 99%   BMI 20.36 kg/m    VENT:  Ventilation Mode: CMV/AC  (Continuous Mandatory Ventilation/ Assist Control)  FiO2 (%): 40 %  Rate Set (breaths/minute): 20 breaths/min  Tidal Volume Set (mL): 500 mL  PEEP (cm H2O): 8 cmH2O  Oxygen Concentration (%): 40 %  Resp: 20    EXAM:   Gen: sedated, vented  HEENT: pink conjunctiva, moist mucosa  Neck: no thyromegaly, masses or JVD  Lungs: clear  CV: regular, no murmurs or gallops appreciated  Abdomen: soft, distended, NT, BS wnl  Ext: no edema, arterial line and cooling catheter on the groin  Neuro: sedated, pupils reactive to light, myoclonic movement of eyelids       Data Review:  Recent Labs   Lab 01/15/22  0659 01/15/22  0604 01/15/22  0505 01/15/22  0405 01/15/22  0401 01/15/22  0311   * 106* 142* 172* 174* 209*        " 1/15/2022 04:05   Sodium 140   Potassium 3.2 (L)   Chloride 116 (H)   Carbon Dioxide 15 (L)   Urea Nitrogen 25 (H)   Creatinine 1.40 (H)   GFR Estimate 59 (L)   Calcium 7.5 (L)   Anion Gap 9   Albumin 2.9 (L)   Protein Total 5.4 (L)   Bilirubin Total 0.4   Alkaline Phosphatase 100    (H)    (H)      1/14/2022 21:14   pH Arterial 7.35 (L)   pCO2 Arterial 35   PO2 Arterial 204 (H)   Bicarbonate Arterial 20 (L)   Base Excess Art -6.8   Oxyhemoglobin >98.5 (H)      1/15/2022 04:05   WBC 24.1 (H)   Hemoglobin 13.7   Hematocrit 40.6   Platelet Count 182   RBC Count 4.47   MCV 91   MCH 30.6   MCHC 33.7   RDW 13.4   % Neutrophils 87   % Lymphocytes 7   % Monocytes 5   % Eosinophils 0   % Basophils 0      1/14/2022 13:49 1/14/2022 13:50 1/14/2022 15:37   Lactic Acid POCT  9.5 () 1.5   Troponin I High Sensitivity 1,182 ()       XR CHEST PORT 1 VIEW  DATE/TIME: 1/14/2022 7:25 PM  INDICATION: Endotracheal tube positioning.  COMPARISON: 01/14/2022.                                         IMPRESSION:   ET tube tip roughly 7 cm above the david, near the level of the clavicular heads. Enteric tube tip at the distal esophagus; recommend at least 10 cm of advancement. Hypoexpanded lungs. No significant change of bilateral opacities. No pleural effusion or pneumothorax. Stable cardiomediastinal silhouette    Head CT scan 1/14/2022  IMPRESSION:    1.  No evidence of acute intracranial hemorrhage or mass effect.    By:  Neville Pastrana MD, 01/15/2022  8:12 AM    Primary Care Physician:  Tha Mandel

## 2022-01-15 NOTE — PROVIDER NOTIFICATION
2100- Levophed at 0.2. Dr. Tirado notified. Vasopressin ordered.     2300- Noted rhythmic movement in eyelids and decorticate posturing in legs. MD Tirado notified. Per MD, no further interventions at this time as patient is already on Keppra and propofol.     0200- MD Tirado at bedside d/t HR 39.  Increasing pressor requirement to keep MAP >65. Dopamine gtt started. Propofol stopped per MD. 1000 mL bolus initiated d/t low urine output (less than 20 mL/hr.)    0445- Discussed with Dr. Tirado about CO being 2-4. Per MD no further interventions at this time d/t improving urine output.     0530- CO2 15, K 3.2. MD Ordered potassium protocol and 2 amps of bicarb.

## 2022-01-15 NOTE — PLAN OF CARE
Problem: Device-Related Complication Risk (Mechanical Ventilation, Invasive)  Goal: Optimal Device Function  Outcome: No Change     Problem: Inability to Wean (Mechanical Ventilation, Invasive)  Goal: Mechanical Ventilation Liberation  Outcome: No Change     Problem: Ventilator-Induced Lung Injury (Mechanical Ventilation, Invasive)  Goal: Absence of Ventilator-Induced Lung Injury  Outcome: No Change

## 2022-01-16 NOTE — PROGRESS NOTES
PULMONARY / CRITICAL CARE PROGRESS NOTE    Date / Time of Admission:  1/14/2022  6:29 PM    Assessment:     Jonathan De La Rosa is a 56 year old male with history of hypertension, hyperlipidemia, and nicotine abuse who was admitted via EMS after suffering an out of hospital [place] cardiac arrest. According to the record he had been experiencing chest pain for approximately 24 hours before he suddenly became unresponsive. His wife was present and initiated CPR and called 911. He had a shockable rhythm on EMS arrival and he was treated with defibrillation 3 times before ROSC. He was then transferred to [facility] where he was found to have profound ST elevation in anterior leads and taken to the cath lab. He was found to have acute thrombotic occlusion of the proximal LAD that was treated with PCI and placement of a JUAN JOSE. He was initiated on cooling protocol and transferred to [facility] ICU. Due to hemodynamic instability, temperature goal was increased to 36 C.     1. S/p VT/VFib cardiac arrest 1/14/22  Unclear downtime, EMS arrived, V fib on the field, s/p defibrillation x 3 prior to recovery of pulse and blood pressure. Transferred to [facility]. Intubated in ED.   Diagnosed with STEMI s/p coronary angiogram stent LAD.   Transferred to [facility]. Head CT scan was negative.   Hypothermia protocol started, due to hemodynamic instability, goal of temp was increased to 36 C.   Patient was rewarmed at 1 AM on 1/16/22   2. Acute respiratory failure s/p intubation 1/14/22  3. Resolved shock post cardiac arrest  4. STEMI  S/p coronary angiogram, stent LAD  Follow up echocardiogram EF 30-35%, anterior wall akinesis, moderate dilated LA, normal RV size.  Start B-blockers, ASA, Ticagrelor, statins  5. NSVT   VT/V fib cardiac arrest secondary to STEMI. NSVT, likely reperfusion arrhthmia.  On amiodarone drip  6. Aspiration pneumonia  Tracheal culture grew strep agalactiae.   Pulmonary toiletting . Continue zosyn IV.    7. Myoclonus movement of eyelids  Head CT scan was negative. Continue propofol and versed drip.   Keppra IV. Neurology is following EEG pending   8. Anemia   9. Thrombocytopenia  Continue to monitor  10. Resolved acute kidney injury  11. Trending down elevated LFTs  12. Tobacco user     Advance Directives:  Full code    Plan:   1. Titrate vent settings A/C 20/500/8/40%  2. Sedation to keep RASS -1 to -2, titrate versed, propofol, fentanyl drip   3. Keppra IV  4. EEG  5. Neurology is following   6. Heplock IV fluids  7. Diuresis   8. Pressors as needed to keep MAP > 65, currently off  9. Amiodarone drip   10. Start B-blocker is stable BP  11. ASA, statins  12. On cangrelor drip   13. Cardiology is following  14. Continue zosyn IV  15. Supplement electrolytes   16. NPO  17. PPI IV  18. Off insulin drip, start sliding scale insulin  19. Monitor platelet level   20. DVT prophylaxis , arixtra SQ    Please contact me if you have any questions.  Total critical care time, not including separately billable procedure time: 45 minutes  This patient had a high probability of imminent or life threatening deterioration due to acute respiratory failure which required my direct attention, intervention and personal management.     Neville Pastrana  Pulmonary / Critical Care  01/16/2022  11:43 AM      ICU DAILY CHECKLIST                         Can patient transfer out of MICU? no    FAST HUG:    Feeding:  Feeding: no.  Patient is receiving NPO    Duarte: yes  Analgesia/Sedation:yes  Versed, propofol, fentanyl drip   Thromboembolic prophylaxis: Yes; Mode:  SCDs  HOB>30:  Yes  Stress Ulcer Protocol Active: Yes; Mode: PPI  Glycemic Control: Any glucose > 180 no; Mode of Insulin Therapy: Sliding Scale Insulin    INTUBATED:  Can patient have daily waking:  No  Can patient have spontaneous breathing trial:  No    Restraints? yes    PHYSICAL THERAPY AND MOBILITY:  Can patient have PT and mobility trial: no  Activity:  bedrest    Subjective:   HPI:  Jonathan De La Rosa is a 56 year old male with history of hypertension, hyperlipidemia, and nicotine abuse who was admitted via EMS after suffering an out of hospital [place] cardiac arrest. According to the record he had been experiencing chest pain for approximately 24 hours before he suddenly became unresponsive. His wife was present and initiated CPR and called 911. He had a shockable rhythm on EMS arrival and he was treated with defibrillation 3 times before ROSC. He was then transferred to [facility] where he was found to have profound ST elevation in anterior leads and taken to the cath lab. He was found to have acute thrombotic occlusion of the proximal LAD that was treated with PCI and placement of a JUAN JOSE. He was initiated on cooling protocol and transferred to [facility] ICU.     Events overnight  - Hypothermia was initiated, Temp goal 34 degrees, become bradycardic, then junctional rhythm ,espisodes of NSVT. Temp goal was increased to 35 degrees.   - Cardiology evaluation, started on amiodarone drip.   - Nausea , vomiting, OG tube connected to suction, dark gastric content was aspirated.     Allergies: Penicillins     MEDS:  Current Facility-Administered Medications   Medication     0.9% sodium chloride BOLUS     acetaminophen (TYLENOL) tablet 650 mg    Or     acetaminophen (TYLENOL) Suppository 650 mg     amiodarone (NEXTERONE) 900 mg in sodium chloride in non-PVC bag 0.9 % 500 mL infusion     artificial tears ophthalmic ointment     aspirin (ASA) Suppository 300 mg     atorvastatin (LIPITOR) tablet 80 mg     cangrelor (KENGREAL) 50 mg in sodium chloride 0.9 % 250 mL infusion     chlorhexidine (PERIDEX) 0.12 % solution 15 mL     dextrose 10% infusion     glucose gel 15-30 g    Or     dextrose 50 % injection 25-50 mL    Or     glucagon injection 1 mg     glucose gel 15-30 g    Or     dextrose 50 % injection 25-50 mL    Or     glucagon injection 1 mg     DOPamine 400 mg in  "dextrose 5% 250 mL (adult std) - premix - CENTRAL     [Held by provider] EPINEPHrine (ADRENALIN) 5 mg in sodium chloride 0.9 % 250 mL infusion CENTRAL     fentaNYL (PF) (SUBLIMAZE) injection 50 mcg     fentaNYL (SUBLIMAZE) 50 mcg/mL bolus from infusion pump 50 mcg     fentaNYL (SUBLIMAZE) infusion     furosemide (LASIX) injection 20 mg     insulin aspart (NovoLOG) injection (RAPID ACTING)     lactated ringers infusion     levETIRAcetam (KEPPRA) 1,000 mg in sodium chloride 0.9 % 100 mL intermittent infusion     Medication Considerations - To maintain platelet inhibition after discontinuation of cangrelor (KENGREAL) [see admin instructions]     medication instruction     Medication Instructions     metoclopramide (REGLAN) injection 10 mg     metoprolol (LOPRESSOR) injection 5 mg     midazolam (VERSED) drip - ADULT 100 mg/100 mL in NS (pre-mix)     midazolam (VERSED) injection 1-3 mg     naloxone (NARCAN) injection 0.2 mg    Or     naloxone (NARCAN) injection 0.4 mg    Or     naloxone (NARCAN) injection 0.2 mg    Or     naloxone (NARCAN) injection 0.4 mg     norepinephrine (LEVOPHED) 4 mg in  mL infusion PREMIX     ondansetron (ZOFRAN) injection 4 mg     pantoprazole (PROTONIX) IV push injection 40 mg     piperacillin-tazobactam (ZOSYN) 3.375 g vial to attach to  mL bag     propofol (DIPRIVAN) infusion     vasopressin (VASOSTRICT) 20 Units in sodium chloride 0.9 % 100 mL standard conc infusion     vecuronium (NORCURON) injection 4.85 mg       Objective:   VITALS:  /60   Pulse 94   Temp 100  F (37.8  C) (Bladder)   Resp 20   Ht 1.854 m (6' 1\")   Wt 94.6 kg (208 lb 8.9 oz)   SpO2 98%   BMI 27.52 kg/m    VENT:  Ventilation Mode: CMV/AC  (Continuous Mandatory Ventilation/ Assist Control)  FiO2 (%): 40 %  Rate Set (breaths/minute): 20 breaths/min  Tidal Volume Set (mL): 500 mL  PEEP (cm H2O): 8 cmH2O  Oxygen Concentration (%): 4 %  Resp: 20    EXAM:   Gen: sedated, vented  HEENT: pink conjunctiva, " moist mucosa  Neck: no thyromegaly, masses or JVD  Lungs: clear  CV: regular, no murmurs or gallops appreciated  Abdomen: soft, distended, NT, BS wnl  Ext: no edema, arterial line and cooling catheter on the groin  Neuro: sedated, unresponsive, pupils reactive to light    Data Review:  Recent Labs   Lab 01/16/22  0837 01/16/22  0551 01/16/22  0412 01/16/22  0305 01/16/22  0207 01/16/22  0059   GLC 95 113 111* 110* 100* 101*      1/16/2022 05:51   Sodium 145   Potassium 3.6   Chloride 118 (H)   Carbon Dioxide 19 (L)   Urea Nitrogen 23 (H)   Creatinine 0.96   GFR Estimate >90   Calcium 7.5 (L)   Anion Gap 8   Magnesium 1.5 (L)   Albumin 2.6 (L)   Protein Total 4.7 (L)   Bilirubin Total 0.6   Alkaline Phosphatase 75    (H)    (H)   Glucose 113   pH Arterial 7.40   pCO2 Arterial 34 (L)   PO2 Arterial 113 (H)   Bicarbonate Arterial 22 (L)   Base Excess Art -3.8   FIO2 40   Oxyhemoglobin 97.5 (H)   WBC 18.8 (H)   Hemoglobin 10.9 (L)   Hematocrit 31.9 (L)   Platelet Count 114 (L)         Tracheal Culture 4+ Normal talib       3+ Streptococcus agalactiae (Group B Streptococcus) Abnormal               1/14/2022 13:49 1/14/2022 13:50 1/14/2022 15:37   Lactic Acid POCT  9.5 (HH) 1.5   Troponin I High Sensitivity 1,182 (HH)       XR CHEST PORT 1 VIEW  DATE/TIME: 1/14/2022 7:25 PM  INDICATION: Endotracheal tube positioning.  COMPARISON: 01/14/2022.                                         IMPRESSION:   ET tube tip roughly 7 cm above the david, near the level of the clavicular heads. Enteric tube tip at the distal esophagus; recommend at least 10 cm of advancement. Hypoexpanded lungs. No significant change of bilateral opacities. No pleural effusion or pneumothorax. Stable cardiomediastinal silhouette    Head CT scan 1/14/2022  IMPRESSION:    1.  No evidence of acute intracranial hemorrhage or mass effect.    Echocardiogram 1/15/22  Interpretation Summary  1.The left ventricle is mildly dilated.  2.Left ventricular  function is decreased. The ejection fraction is 30-35%  (moderately reduced).  3.There is anterior wall akinesis.  4.Normal right ventricle size and systolic function.  4.The left atrium is moderately dilated.  5.No hemodynamically significant valvular abnormalities on 2D or color flow  imaging.  6.IVC diameter and respiratory changes fall into an intermediate range  suggesting an RA pressure of 8 mmHg.  7.There is no comparison study available.    By:  Neville Pastrana MD, 01/16/2022  11:43 AM    Primary Care Physician:  Tha Mandel

## 2022-01-16 NOTE — PROGRESS NOTES
ETT # 7.5 secured at 23 cm at the teeth. Patient remains vented/full support. Current settings: AC 20 500 40% 8. PIP 21 cmH2O, Pplat 17 cmH2O. sats 100%. RT following.    Elder Meade, RT

## 2022-01-16 NOTE — PROGRESS NOTES
Bedside EEG was performed that included photic, auditory, and sensory stimulation. Hyperventilation was not possible given the patient's clinical state. Skin is intact.  EEG # .  EEG [id number] system used.

## 2022-01-16 NOTE — PROGRESS NOTES
"[facility]  Neurology Consultation - Progress Note    Patient Name:  Jonathan De La Rosa  Date of Service:  January 16, 2022    Subjective:    Per nursing, no significant changes overnight.  Patient continues to have frequent shivering.  He did have abnormal eye movements, but this resolved with only fentanyl.    Objective:    Vitals: /60   Pulse 94   Temp 100  F (37.8  C) (Bladder)   Resp 20   Ht 1.854 m (6' 1\")   Wt 94.6 kg (208 lb 8.9 oz)   SpO2 98%   BMI 27.52 kg/m    General: Lying in bed, intubated and sedated  Head: Atraumatic, normocephalic   Neurologic:  Intubated, sedated, requiring propofol and Versed, but at lower doses than previously.  Pupils are miotic bilaterally but there is subtle movement in left especially to light.  Difficult to fully assess right pupil.  Extraocular muscles not intact to doll's eyes.  Does gag with deep suctioning.  No response to noxious stimuli in upper and lower extremities no seizure-like activity or twitching noted on my assessment.  There was very subtle shivering    Pertinent Investigations:    I have personally reviewed most recent and pertinent labs, tests, and radiological images.     Assessment  #Cardiac arrest  #Seizure-like activity  #Possible anoxic injury    56-year-old male without significant previous past medical history except for hypertension and hyperlipidemia presented with cardiac arrest.  Found to have LAD occlusion.  Neurology consulted for concern for anoxic injury and seizures.  His exam is stable from previous, and is certainly confounded by multiple sedating agents on board.  He only recently completed hypothermia protocol.  Given concerns for some abnormal eye movements and myoclonic jerking, I have messaged EEG tech to get urgent EEG over this weekend.  If significantly abnormal may consider 24-hour EEG on Monday.  If exam not significantly improving will need an MRI in 4 to 5 days after the arrest to " look for evidence of anoxic injury.  I did instruct nursing to limit sedation, and hopefully hold for at least a portion of the EEG recording    Recommendations:   -Continue Keppra 1000 mg twice daily  -EEG today, ordered stat and EEG techs notified  -MRI brain on 1/18 unless exam significantly improved  -Neuro to continue to follow    Thank you for involving Neurology in the care of Jonathan De La Rosa.      Wilman James, DO

## 2022-01-16 NOTE — PROCEDURES
"Procedures  PICC Line Insertion Procedure Note  Pt. Name: Jonathan De La Rosa  MRN:        [id number]    Procedure: Insertion of a  triple Lumen  5 fr  Bard SOLO (valved) Power PICC, Lot number [id number]    Indications: Vascular Access    Contraindications : None noted in H&P    Procedure Details   Patient identified with 2 identifiers and \"Time Out\" conducted.  .     Central line insertion bundle followed: hand hygeine performed prior to procedure, site cleansed with cholraprep, hat, mask, sterile gloves,sterile gown worn, patient draped with maximum barrier head to toe drape, sterile field maintained.    The vein was assessed and found to be compressible and of adequate size. 3 ml 1% Lidocaine administered sq to the insertion site. A 5 Fr PICC was inserted into the basilic vein of the right arm with ultrasound guidance. 1 attempt(s) required to access vein.   Catheter threaded without difficulty. Good blood return noted.    Modified Seldinger Technique used for insertion.    The 8 sharps that are included in the PICC insertion kit were accounted for and disposed of in the sharps container prior to breakdown of the sterile field.    Catheter secured with Statlock, biopatch and Tegaderm dressing applied.    Findings:  Total catheter length  45 cm, with 5 cm exposed. Mid upper arm circumference is 38 cm. Catheter was flushed with 30 cc NS. Patient  tolerated procedure well.    Tip placement verified by 3CG . Tip placement in the SVC.    CLABSI prevention brochure left at bedside.    Patient's primary RN notified PICC is ready for use.    Comments:        Uzma Chisholm RN,BSN  [facility]        "

## 2022-01-16 NOTE — PROGRESS NOTES
RT PROGRESS NOTE    VENT DAY# 3    CURRENT SETTINGS:   Ventilation Mode: CMV/AC  (Continuous Mandatory Ventilation/ Assist Control)  FiO2 (%): 40 %  Rate Set (breaths/minute): 20 breaths/min  Tidal Volume Set (mL): 500 mL  PEEP (cm H2O): 8 cmH2O  Oxygen Concentration (%): 4 %  Resp: 20      PATIENT PARAMETERS:  PIP 22  Pplat:  20  Pmean:  11  Compliance: 48  SBT: NO   Secretions:  occ thick tan secretions  02 Sats:  99%    ETT SIZE 7.5 Secured at 23 cm at teeth/gums    Respiratory Medications: none     ABG: @0550 pH 7.40; pCO2 34; pO2 113; HCO3 22, %O2 Sat 98, BE -3.8 on above settings    NOTE / SHIFT SUMMARY:   Cont current vent settings    Saritha Otero, RT

## 2022-01-16 NOTE — PROGRESS NOTES
Impression and Plan     Impression:   1. VT/VF cardiac arrest s/p ROSC and PCI to proximal LAD. With recurrent NSVT while in hypothermia protocol after revascularization. Total down time approximately 15 minutes based on discussion with wife who witnessed arrest and promptly started CPR. Currently rewarmed, awaiting evidence of return of neurologic function.  2. Coronary artery disease with acute anterior ST elevation myocardial infarction  3. Ischemic cardiomyopathy with LVEF 30-35%.  4. Acute respiratory failure due to cardiac arrest and sedation, cooling protocol  5. Acute circulatory shock, likely cardiogenic and vasoplegic from his STEMI, cardiac arrest, sedation, and hypothermia  6. Possible anoxic brain injury  7. Acute systolic congestive heart failure - net I/O positive 4L since admission. Currently oxygenating and ventilating okay.    Plan:    Continue amiodarone gtt - transition to PO once able to tolerate enteral medications.    Continue cangrelor gtt - plan to transition to ticagrelor once able to tolerate PO medications.    Closely follow and replete K and Mg as needed.    Start furosemide 20 mg q8h to start diuresis.    Will continue to follow.      Primary Cardiologist: Dr. Gallegos    Subjective     Rewarmed. Weaned off of dopamine and norepinephrine. Remains on vasopressin, but coming down on this, too. Received a dose of furosemide overnight. I/O remain positive.     Cardiac Diagnostics   Telemetry (personally reviewed): sinus rhythm, with brief runs of an idioventricular rhythm.    TTE 1/15/22  Interpretation Summary   1.The left ventricle is mildly dilated.   2.Left ventricular function is decreased. The ejection fraction is 30-35%   (moderately reduced).   3.There is anterior wall akinesis.   4.Normal right ventricle size and systolic function.   4.The left atrium is moderately dilated.   5.No hemodynamically significant valvular abnormalities on 2D or color flow   imaging.   6.IVC diameter  "and respiratory changes fall into an intermediate range   suggesting an RA pressure of 8 mmHg.   7.There is no comparison study available.     Cardiac Cath (results reviewed): 1/14/22  Left Main   The vessel was visualized by selective angiography and is moderate in size. There was 0% vessel disease.   Left Anterior Descending   Prox LAD lesion is 100% stenosed. Culprit lesion. The lesion is thrombotic.   Mid LAD lesion is 30% stenosed.   Dist LAD lesion is 95% stenosed. Lesion is at the apical LAD. Vessel is small beyond the lesion   Left Circumflex   First Obtuse Marginal Branch   1st Mrg lesion is 20% stenosed.   Right Coronary Artery   Prox RCA lesion is 25% stenosed.   Dist RCA lesion is 70% stenosed. The lesion is discrete.         Physical Examination       BP (!) 151/100   Pulse 80   Temp (!) 100.6  F (38.1  C)   Resp 20   Ht 1.854 m (6' 1\")   Wt 94.6 kg (208 lb 8.9 oz)   SpO2 100%   BMI 27.52 kg/m          Intake/Output Summary (Last 24 hours) at 1/15/2022 1101  Last data filed at 1/15/2022 1016  Gross per 24 hour   Intake 2311.61 ml   Output 1703 ml   Net 608.61 ml       General: intubated, sedated  HENT: external ears normal. Nares patent. Mucous membranes moist. ETT remains in place.  Eyes: perrla, extraocular muscles intact. No scleral icterus.   Lungs: mostly clear anterior and lateral breath sounds.  COR: regular rate and rhythm, No murmurs, rubs, or gallops  Abd: nondistended, BS present  Extrem: trace edema         Imaging      No new imaging    Lab Results   Lab Results   Component Value Date    BUN 23 (H) 01/16/2022     01/16/2022    CO2 19 (L) 01/16/2022       Lab Results   Component Value Date    WBC 18.8 (H) 01/16/2022    HGB 10.9 (L) 01/16/2022    HCT 31.9 (L) 01/16/2022    MCV 91 01/16/2022     (L) 01/16/2022       Lab Results   Component Value Date    INR 1.24 (H) 01/16/2022               Current Inpatient Scheduled Medications   Scheduled Meds:    sodium chloride 0.9%  " 1,000 mL Intravenous Once     artificial tears   Both Eyes Q8H     aspirin  300 mg Rectal Daily     atorvastatin  80 mg Per OG Tube QPM     chlorhexidine  15 mL Mouth/Throat Q12H     insulin aspart  1-4 Units Subcutaneous Q4H     levETIRAcetam  1,000 mg Intravenous Q12H     magnesium sulfate  4 g Intravenous Once     pantoprazole (PROTONIX) IV  40 mg Intravenous QAM AC     piperacillin-tazobactam  3.375 g Intravenous Q8H     Continuous Infusions:    amiodarone 0.5 mg/min (01/16/22 0644)     cangrelor (KENGREAL) infusion ADULT for PCI 4 mcg/kg/min (01/16/22 0732)     dextrose       DOPamine Stopped (01/16/22 0729)     [Held by provider] EPINEPHrine Stopped (01/14/22 2034)     fentaNYL 50 mcg/hr (01/16/22 0733)     lactated ringers 125 mL/hr at 01/16/22 0645     - MEDICATION INSTRUCTIONS -       - MEDICATION INSTRUCTIONS -       - MEDICATION INSTRUCTIONS -       midazolam 2 mg/hr (01/16/22 0800)     norepinephrine 0.04 mcg/kg/min (01/16/22 0827)     propofol (DIPRIVAN) infusion 10 mcg/kg/min (01/16/22 0800)     vasopressin 2.4 Units/hr (01/16/22 0645)            Medications Prior to Admission   Prior to Admission medications    Not on File

## 2022-01-16 NOTE — PLAN OF CARE
Problem: Adult Inpatient Plan of Care  Goal: Plan of Care Review  Outcome: No Change  Goal: Patient-Specific Goal (Individualized)  Outcome: No Change  Goal: Absence of Hospital-Acquired Illness or Injury  Outcome: No Change  Intervention: Identify and Manage Fall Risk  Recent Flowsheet Documentation  Taken 1/16/2022 0800 by Ermelinda Helms RN  Safety Promotion/Fall Prevention:    bed alarm on    increased rounding and observation    increase visualization of patient    safety round/check completed  Intervention: Prevent Skin Injury  Recent Flowsheet Documentation  Taken 1/16/2022 0800 by Ermelinda Helms RN  Body Position:    right    turned    legs elevated    heels elevated  Intervention: Prevent and Manage VTE (Venous Thromboembolism) Risk  Recent Flowsheet Documentation  Taken 1/16/2022 0800 by Ermelinda Helms RN  VTE Prevention/Management: anticoagulant therapy maintained  Intervention: Prevent Infection  Recent Flowsheet Documentation  Taken 1/16/2022 0800 by Ermelinda Helms RN  Infection Prevention:    equipment surfaces disinfected    hand hygiene promoted    single patient room provided    visitors restricted/screened  Goal: Optimal Comfort and Wellbeing  Outcome: No Change  Goal: Readiness for Transition of Care  Outcome: No Change     Problem: Risk for Delirium  Goal: Optimal Coping  Outcome: No Change  Goal: Improved Behavioral Control  Outcome: No Change  Goal: Improved Attention and Thought Clarity  Outcome: No Change  Goal: Improved Sleep  Outcome: No Change     Problem: Adjustment to Illness (Targeted Temperature Management)  Goal: Optimal Response to Life-Threatening Event  Outcome: No Change     Problem: Infection (Targeted Temperature Management)  Goal: Absence of Infection Signs and Symptoms  Outcome: No Change  Intervention: Prevent Infection  Recent Flowsheet Documentation  Taken 1/16/2022 0800 by Ermelinda Helms RN  Infection Prevention:    equipment surfaces disinfected    hand  hygiene promoted    single patient room provided    visitors restricted/screened     Problem: Communication Impairment (Mechanical Ventilation, Invasive)  Goal: Effective Communication  Outcome: No Change     Problem: Device-Related Complication Risk (Mechanical Ventilation, Invasive)  Goal: Optimal Device Function  Outcome: No Change     Problem: Inability to Wean (Mechanical Ventilation, Invasive)  Goal: Mechanical Ventilation Liberation  Outcome: No Change  Intervention: Promote Extubation and Mechanical Ventilation Liberation  Recent Flowsheet Documentation  Taken 1/16/2022 0800 by Ermelinda Helms, RN  Medication Review/Management:    medications reviewed    pharmacy consulted     Problem: Nutrition Impairment (Mechanical Ventilation, Invasive)  Goal: Optimal Nutrition Delivery  Outcome: No Change     Problem: Skin and Tissue Injury (Mechanical Ventilation, Invasive)  Goal: Absence of Device-Related Skin and Tissue Injury  Outcome: No Change     Problem: Ventilator-Induced Lung Injury (Mechanical Ventilation, Invasive)  Goal: Absence of Ventilator-Induced Lung Injury  Outcome: No Change  Intervention: Prevent Ventilator-Associated Pneumonia  Recent Flowsheet Documentation  Taken 1/16/2022 0800 by Ermelinda Helms, RN  VAP Prevention Bundle:    HOB elevation maintained    oral care regularly provided    readiness to extubate assessed    stress ulcer prophylaxis provided    vent circuit breaks minimized    VTE prophylaxis provided  Oral Care: swabbed with antiseptic solution  Head of Bed (HOB) Positioning: HOB at 30 degrees  VAP Prevention Measures: completed     Able to wean propofol and versed drips today, 1 hour EEG this afternoon. Able to discontinue dopamine, levophed decreased to 0.06. Given fentanyl boluses for shivering with relief. Fever of 100.8 this morning, cooling pads put back on, PRN Tylenol given with relief. Adequate urine output this shift, clear/ashutosh. Arterial line dressing changed this shift.  Femoral central line and left PIV discontinued, new right PICC line placed.

## 2022-01-17 NOTE — PROGRESS NOTES
"[facility]  Neurology Consultation - Progress Note    Patient Name:  Jonathan De La Rosa  Date of Service:  January 17, 2022    Subjective:    Nurses note that patient has had some head twitching.  Stated was potentially rhythmic.  Seem to get better with the increased propofol.  EEG reviewed    Objective:    Vitals: /62   Pulse 113   Temp 99.7  F (37.6  C) (Bladder)   Resp 25   Ht 1.854 m (6' 1\")   Wt 94.6 kg (208 lb 8.9 oz)   SpO2 90%   BMI 27.52 kg/m    General: Lying in bed, intubated, sedated  Head: Atraumatic, normocephalic   Neurologic:  Exam done with propofol for about 8 minutes.  Pupils are miotic bilaterally but there is subtle reaction on the left.  No clear reaction on the right.  Extraocular muscles not intact doll's eyes.  For cough with deep suctioning today.  No response to noxious stimuli in upper and lower extremities.  Did have a few twitches of right shoulder and had, but these were single and not in clusters.    Pertinent Investigations:    I have personally reviewed most recent and pertinent labs, tests, and radiological images.     EEG personally reviewed.  Diffusely slow background.  No periodic discharges or abnormal sharp wave activity.  Was not particularly low voltage    Assessment  Cardiac arrest  #Seizure-like activity  #Possible anoxic injury    56-year-old man with a history of hypertension and hyperlipidemia found to have a cardiac arrest.  Neurology consulted for concern for seizures and anoxic injury.  Exam continues to be confounded by sedation, although appears relatively unchanged on lower doses of propofol and off of Versed today.  EEG did not have any concerning findings.  EEG was done off sedation for at least.  Per report.  It appears required some sedation.  The events of head twitching that I saw today were not particularly concerning for seizures, however if they were to occur rhythmically and in rounds this would raise the " question.  At this time, objective evidence of anoxic injury is limited.  Would get an MRI tomorrow or Wednesday.  Patient will continue to follow-up clinical exams as sedation is weaned to determine if underlying anoxic injury.  I did update wife on neurologic status today    Recommendations:   -Continue Keppra 1000 mg twice  -MRI brain 1/18, if can be done safely from hemodynamic standpoint  -Neuro to continue to follow    Thank you for involving Neurology in the care of Jonathan De La Rosa.      Wilman James, DO

## 2022-01-17 NOTE — PHARMACY-VANCOMYCIN DOSING SERVICE
"Pharmacy Vancomycin Initial Note  Date of Service 2022  Patient's  1965  56 year old, male    Indication: Aspiration Pneumonia    Current estimated CrCl = Estimated Creatinine Clearance: 108.2 mL/min (based on SCr of 1.02 mg/dL).    Creatinine for last 3 days  2022:  1:49 PM Creatinine 1.43 mg/dL;  9:14 PM Creatinine 1.38 mg/dL  1/15/2022:  4:05 AM Creatinine 1.40 mg/dL;  3:03 PM Creatinine 1.03 mg/dL  2022:  5:51 AM Creatinine 0.96 mg/dL  2022:  4:09 AM Creatinine 1.02 mg/dL    Recent Vancomycin Level(s) for last 3 days  No results found for requested labs within last 72 hours.      Vancomycin IV Administrations (past 72 hours)      No vancomycin orders with administrations in past 72 hours.                Nephrotoxins and other renal medications (From now, onward)    Start     Dose/Rate Route Frequency Ordered Stop    22 0200  vancomycin (VANCOCIN) 1000 mg in dextrose 5% 200 mL PREMIX         1,000 mg  200 mL/hr over 1 Hours Intravenous EVERY 12 HOURS 22 1213      22 1400  vancomycin (VANCOCIN) 1,750 mg in sodium chloride 0.9 % 500 mL intermittent infusion         1,750 mg  over 2 Hours Intravenous ONCE 22 1241      22 1100  furosemide (LASIX) injection 20 mg         20 mg  over 1-3 Minutes Intravenous EVERY 8 HOURS 22 0850      01/15/22 0140  piperacillin-tazobactam (ZOSYN) 3.375 g vial to attach to  mL bag        Note to Pharmacy: Extended infusion dosing to start 6 hours after initial infusion.   \"Followed by\" Linked Group Details    3.375 g  over 240 Minutes Intravenous EVERY 8 HOURS 22 1940      22 1930  norepinephrine (LEVOPHED) 4 mg in  mL infusion PREMIX         0.01-0.6 mcg/kg/min × 70 kg  2.6-157.5 mL/hr  Intravenous CONTINUOUS 22 1919            Contrast Orders - past 72 hours (72h ago, onward)            Start     Dose/Rate Route Frequency Ordered Stop    10/30/22 1030  perflutren lipid microsphere " (DEFINITY) injection SUSP 3 mL         3 mL Intravenous ONCE 01/15/22 1050 01/15/22 1030          [employer] Prediction of Planned Initial Vancomycin Regimen  Loading dose: 1750 mg  Regimen: 1000 mg IV every 12 hours.  Start time: 02:00 on 01/18/2022  Exposure target: AUC24 (range)400-600 mg/L.hr   AUC24,ss: 476 mg/L.hr  Probability of AUC24 > 400: 68 %  Ctrough,ss: 15.3 mg/L  Probability of Ctrough,ss > 20: 28 %  Probability of nephrotoxicity (Lodise KEKE 2009): 11 %        Plan:  1. Start vancomycin  1000 mg IV q12h.   2. Vancomycin monitoring method: AUC  3. Vancomycin therapeutic monitoring goal: 400-600 mg*h/L  4. Pharmacy will check vancomycin levels as appropriate in 1-3 Days.    5. Serum creatinine levels will be ordered daily for the first week of therapy and at least twice weekly for subsequent weeks.      Evangelina Rowland, [facility] [place]

## 2022-01-17 NOTE — PROGRESS NOTES
CLINICAL NUTRITION SERVICES - ASSESSMENT NOTE     Nutrition Prescription    RECOMMENDATIONS FOR MDs/PROVIDERS TO ORDER:      Malnutrition Status:    none    Recommendations already ordered by Registered Dietitian (RD):  Slow rate of TF advance to 10 ml every 8 hrs as electrolytes needing replacement.  Check phosphorus.  Adjust TF goal to 65 ml/hr.  Start 2 pkts prosource/day.  Increase FWF to 200 ml every 4 hrs for hypernatremia.    Jevity 1.2 Calin @ goal of  65ml/hr  (1560ml/day)  will provide: 1952 kcals, 108 g PRO, 1258 ml free H20, 1200 ml flushes, 264 g CHO, and 27 g fiber daily.    Future/Additional Recommendations:  TF/propofol/hydration     REASON FOR ASSESSMENT  Jonathan De La Rosa is a/an 56 year old male assessed by the dietitian for Provider Order - Registered Dietitian to Assess and Order TF per Medical Nutrition Therapy Protocol    NUTRITION HISTORY  Pt intubated.  Met pt's spouse.  Pt with decreased po intake a few days prior to admit.  Pt's spouse interested in heart healthy diet ed before discharge    CURRENT NUTRITION ORDERS  Diet: NPO  TF started per MD:  Jevity 1.2 start at 20 ml/hr increase 10 ml every 4 hrs to goal 50 ml/hr   FWF 60 ml every 4 hrs.    Intake/Tolerance: Jevity 1.2 Calin @ goal of  50ml/hr  (1200ml/day)  will provide: 1440 kcals, 66 g PRO, 968 ml free H20, 360 ml flushes, 203 g CHO, and 20 g fiber daily.  Propofol provided 240 calories/24 hrs.    LABS  ROUTINE ICU LABS (Last four results)  CMPRecent Labs   Lab 01/17/22  1157 01/17/22  0829 01/17/22  0823 01/17/22  0409 01/17/22  0405 01/16/22  1216 01/16/22  0854 01/16/22  0837 01/16/22  0551 01/15/22  1511 01/15/22  1503 01/15/22  0820 01/15/22  0819 01/15/22  0505 01/15/22  0405 01/14/22  2112 01/14/22  1349   NA  --   --   --  148*  --   --   --   --  145  --  144  --   --   --  140   < > 140   POTASSIUM  --  4.1  --  3.2*  --   --  3.6  --  3.6  3.6   < > 3.8   < >  --   --  3.2*   < > 3.2*   CHLORIDE  --   --   --  118*  --   --    --   --  118*  --  117*  --   --   --  116*   < > 108   CO2  --   --   --  20*  --   --   --   --  19*  --  20*  --   --   --  15*   < > 15*   ANIONGAP  --   --   --  10  --   --   --   --  8  --  7  --   --   --  9   < > 17*   GLC 86  --  112* 120 114*   < >  --    < > 113   < > 103   < >  --    < > 172*   < > 300*   BUN  --   --   --  26*  --   --   --   --  23*  --  26*  --   --   --  25*   < > 20   CR  --   --   --  1.02  --   --   --   --  0.96  --  1.03  --   --   --  1.40*   < > 1.43*   GFRESTIMATED  --   --   --  86  --   --   --   --  >90  --  85  --   --   --  59*   < > 58*   NATACHA  --   --   --  7.8*  --   --   --   --  7.5*  --  7.4*  --   --   --  7.5*   < > 8.9   MAG  --   --   --  2.3  --   --   --   --  1.5*  --   --   --  1.9  --   --   --  2.7*   PROTTOTAL  --   --   --  5.0*  --   --   --   --  4.7*  --   --   --   --   --  5.4*  --  7.3   ALBUMIN  --   --   --  2.4*  --   --   --   --  2.6*  --   --   --   --   --  2.9*  --  3.6   BILITOTAL  --   --   --  0.7  --   --   --   --  0.6  --   --   --   --   --  0.4  --  0.6   ALKPHOS  --   --   --  74  --   --   --   --  75  --   --   --   --   --  100  --  148   AST  --   --   --  131*  --   --   --   --  215*  --   --   --   --   --  578*  --  175*   ALT  --   --   --  123*  --   --   --   --  166*  --   --   --   --   --  280*  --  242*    < > = values in this interval not displayed.     CBC  Recent Labs   Lab 01/17/22  0408 01/16/22  0551 01/15/22  1503 01/15/22  0405 01/14/22  1349   WBC 16.2* 18.8*  --  24.1* 14.6*   RBC 3.29* 3.51*  --  4.47 5.43   HGB 10.2* 10.9* 12.7* 13.7 16.5   HCT 30.2* 31.9*  --  40.6 50.6   MCV 92 91  --  91 93   MCH 31.0 31.1  --  30.6 30.4   MCHC 33.8 34.2  --  33.7 32.6   RDW 14.5 14.0  --  13.4 13.3   PLT 87* 114*  --  182 216     INR  Recent Labs   Lab 01/16/22  0854 01/16/22  0551 01/16/22  0004 01/15/22  0958   INR 1.26* 1.24* 1.29* 1.14     Arterial Blood Gas  Recent Labs   Lab 01/16/22  0551 01/15/22  1510  01/15/22  0959 01/15/22  0819 01/14/22  2114 01/14/22  1350 01/14/22  1349   PH 7.40 7.38 7.27* 7.34* 7.35*   < >  --    PCO2 34* 34* 41  --  35   < >  --    PO2 113* 158* 142*  --  204*  --   --    HCO3 22* 21* 18*  --  20*   < >  --    O2PER 40 40 40  --   --   --  100    < > = values in this interval not displayed.   Labs reviewed    MEDICATIONS    sodium chloride 0.9%  1,000 mL Intravenous Once     albuterol  2.5 mg Nebulization 2 times daily     artificial tears   Both Eyes Q8H     [START ON 1/18/2022] aspirin  81 mg Oral or Feeding Tube Daily     atorvastatin  80 mg Per OG Tube QPM     carvedilol  12.5 mg Oral or Feeding Tube BID     chlorhexidine  15 mL Mouth/Throat Q12H     fondaparinux ANTICOAGULANT  2.5 mg Subcutaneous Q24H     furosemide  20 mg Intravenous Q8H     insulin aspart  1-6 Units Subcutaneous Q4H     levETIRAcetam  1,000 mg Intravenous Q12H     pantoprazole (PROTONIX) IV  40 mg Intravenous QAM AC     piperacillin-tazobactam  3.375 g Intravenous Q8H     sodium chloride (PF)  10-40 mL Intracatheter Q7 Days     ticagrelor  90 mg Oral or Feeding Tube BID     vancomycin  1,750 mg Intravenous Once     [START ON 1/18/2022] vancomycin  1,000 mg Intravenous Q12H        amiodarone 0.5 mg/min (01/17/22 1200)     cangrelor (KENGREAL) infusion ADULT for PCI 4 mcg/kg/min (01/17/22 1200)     dextrose       dextrose       DOPamine Stopped (01/16/22 9447)     fentaNYL 75 mcg/hr (01/17/22 1200)     lactated ringers 25 mL/hr at 01/17/22 1200     - MEDICATION INSTRUCTIONS -       - MEDICATION INSTRUCTIONS -       - MEDICATION INSTRUCTIONS -       midazolam Stopped (01/16/22 1547)     norepinephrine Stopped (01/17/22 5202)     propofol (DIPRIVAN) infusion 45 mcg/kg/min (01/17/22 1200)      acetaminophen **OR** acetaminophen, dextrose, dextrose, glucose **OR** dextrose **OR** glucagon, fentaNYL, fentaNYL, lidocaine 4%, lidocaine (buffered or not buffered), - MEDICATION INSTRUCTIONS -, - MEDICATION INSTRUCTIONS -, -  "MEDICATION INSTRUCTIONS -, metoclopramide, metoprolol, midazolam, naloxone **OR** naloxone **OR** naloxone **OR** naloxone, ondansetron, sodium chloride (PF), sodium chloride (PF), sodium chloride (PF), vecuronium   Medications reviewed    ANTHROPOMETRICS  Height: 185.4 cm (6' 1\")  Most Recent Weight: 94.6 kg (208 lb 8.9 oz)      BMI: Overweight BMI 25-29.9  Weight History:   Wt Readings from Last 8 Encounters:   01/15/22 94.6 kg (208 lb 8.9 oz)   01/14/22 70 kg (154 lb 5.2 oz)   +7.88L per I/Os    Dosing Weight: 83.6 kg, IBW    ASSESSED NUTRITION NEEDS  Estimated Energy Needs: 9753-1026 kcals/day (25 - 30 kcals/kg)  Justification: Maintenance  Estimated Protein Needs: 100-125 grams protein/day (1.2 - 1.5 grams of pro/kg)  Justification: Increased needs  Estimated Fluid Needs: 2505+ mL/day (30+)   Justification: Increased needs    PHYSICAL FINDINGS  See malnutrition section below.  Mild edema per chart.       MALNUTRITION:  % Weight Loss:  None noted  % Intake:  Decreased intake does not meet criteria for malnutrition, a few days of decreased intake  Subcutaneous Fat Loss:  None observed  Muscle Loss:  None observed  Fluid Retention:  Mild per chart    Malnutrition Diagnosis: Patient does not meet two of the above criteria necessary for diagnosing malnutrition  In Context of:  Acute illness or injury    NUTRITION DIAGNOSIS  Swallowing difficulty related to acute illness as evidenced by intubation      INTERVENTIONS  Implementation  Nutrition Education: Unable to complete due to pt intubated   Enteral Nutrition - Initiate     Goals  Tolerate TF at goal rate  Meet nutrition needs  Electrolytes WNL     Monitoring/Evaluation  Progress toward goals will be monitored and evaluated per protocol.    "

## 2022-01-17 NOTE — PROGRESS NOTES
PULMONARY / CRITICAL CARE PROGRESS NOTE    Date / Time of Admission:  1/14/2022  6:29 PM    Assessment:     Jonathan De La Rosa is a 56 year old male with history of hypertension, hyperlipidemia, and nicotine abuse who was admitted via EMS after suffering an out of hospital VT cardiac arrest. According to the record he had been experiencing chest pain for approximately 24 hours before he suddenly became unresponsive. His wife was present and initiated CPR and called 911. He had a shockable rhythm on EMS arrival and he was treated with defibrillation 3 times before ROSC. He was then transferred to [facility] where he was found to have profound ST elevation in anterior leads and taken to the cath lab. He was found to have acute thrombotic occlusion of the proximal LAD that was treated with PCI and placement of a JUAN JOSE. He was initiated on cooling protocol and transferred to [facility] ICU. Due to hemodynamic instability, temperature goal was increased to 36 C.     1. S/p VT/VFib cardiac arrest 1/14/22  Unclear downtime, EMS arrived, V fib on the field, s/p defibrillation x 3 prior to recovery of pulse and blood pressure. Transferred to [facility]. Intubated in ED.   Diagnosed with STEMI s/p coronary angiogram stent LAD.   Transferred to [facility] on 1/14. Head CT scan was negative.   Hypothermia protocol started, due to hemodynamic instability, goal of temp was increased to 36 C.   Patient was rewarmed on 1/16/22 at 1 AM.   Neurology consulted. EEG was done.   2. Acute respiratory failure s/p intubation 1/14/22  Increase O2 requirements , follow up CXR showed bilateral pulmonary infiltrates. Pneumonia , pulmonary edema. Titrate vent settings.   3. Resolved shock post cardiac arrest  4. STEMI  S/p coronary angiogram, stent LAD on 1/14  Follow up echocardiogram EF 30-35%, anterior wall akinesis, moderate dilated LA, normal RV size.  Start B-blockers, continue ASA, statins,   On cangrelor due to concerns of  GI absorption of meds. Patient has bowel sounds, resume Ticagrelor.   Cardiology following.   5. NSVT   VT/V fib cardiac arrest secondary to STEMI. NSVT, likely reperfusion arrhthmia.  On amiodarone drip. Plan to transition to enteric route if needed.   6. Aspiration pneumonia  Follow up CXR showed increase bilateral pulmonary infiltrates  Tracheal culture is growing strep agalactiae and staph aureus.   Schedule bronchodilators. Continue zosyn IV, add vancomycin.   7. Myoclonus movement of eyelids  Head CT scan was negative. Continue propofol and versed drip.   Keppra IV. Neurology is following. EEG results are pending   8. Anemia   9. Thrombocytopenia  HIPA pending. Continue to monitor  10. Resolved acute kidney injury  11. Hypokalemia  12. Trending down elevated LFTs  13. Tobacco user     Advance Directives:  Full code    Plan:   1. Titrate vent settings A/C 16/500/10/60%  2. Sedation to keep RASS -1 to -2, titrate versed, propofol, fentanyl drip   3. Pulmonary toiletting , schedule albuterol nebs  4. Follow up CXR in view of trending up O2 requirements  5. Neurology is following   6. On keppra IV 1000 mg BID  7. EEG results pending   8. Brain MRI in AM  9. Heplock IV fluids  10. Start carvedilol   11. Continue IV diuresis   12. Transition amiodarone drip to enteric dose  13. ASA, statins  14. On cangrelor drip , in view of recovery GI function, start ticagrelor   15. Cardiology is following  16. Abx , continue zosyn IV, add vancomycin in view new pulmonary infiltrates and tracheal cultures is growing staph aureus in addition to strep agalactiae   17. Supplement electrolytes   18. Start tube feedings  19. PPI IV  20. Glucose level monitoring , sliding scale insulin  21. Check HIPA  22. Monitor platelet level   23. DVT prophylaxis , arixtra SQ    Please contact me if you have any questions.  Total critical care time, not including separately billable procedure time: 45 minutes  This patient had a high probability  of imminent or life threatening deterioration due to acute respiratory failure which required my direct attention, intervention and personal management.     Neville Pastrana  Pulmonary / Critical Care  01/17/2022  10:00 AM    ICU DAILY CHECKLIST                         Can patient transfer out of MICU? no    FAST HUG:    Feeding:  Feeding: yes Patient is receiving tube feedings   Duarte: yes  Analgesia/Sedation:yes  Versed, propofol, fentanyl drip   Thromboembolic prophylaxis: Yes; Mode:  SCDs  HOB>30:  Yes  Stress Ulcer Protocol Active: Yes; Mode: PPI  Glycemic Control: Any glucose > 180 no; Mode of Insulin Therapy: Sliding Scale Insulin    INTUBATED:  Can patient have daily waking: yes  Can patient have spontaneous breathing trial: yes    Restraints? yes    PHYSICAL THERAPY AND MOBILITY:  Can patient have PT and mobility trial: no  Activity: bedrest    Subjective:   HPI:  Jonathan De La Rosa is a 56 year old male with history of hypertension, hyperlipidemia, and nicotine abuse who was admitted via EMS after suffering an out of hospital [place] cardiac arrest. According to the record he had been experiencing chest pain for approximately 24 hours before he suddenly became unresponsive. His wife was present and initiated CPR and called 911. He had a shockable rhythm on EMS arrival and he was treated with defibrillation 3 times before ROSC. He was then transferred to [facility] where he was found to have profound ST elevation in anterior leads and taken to the cath lab. He was found to have acute thrombotic occlusion of the proximal LAD that was treated with PCI and placement of a JUAN JOSE. He was initiated on cooling protocol and transferred to [facility] ICU.   Hypothermia was initiated, Temp goal 34 degrees, become bradycardic, then junctional rhythm ,espisodes of NSVT. Temp goal was increased to 36 degrees. On amiodarone drip. Treated for aspiration pneumonia.     Events overnight  - No further episodes of N/V  -  Increase O2 requirements from 40% to 60%  - Febrile overnight  - Off pressors. On amiodarone drip     Allergies: Penicillins     MEDS:  Current Facility-Administered Medications   Medication     0.9% sodium chloride BOLUS     acetaminophen (TYLENOL) tablet 650 mg    Or     acetaminophen (TYLENOL) Suppository 650 mg     amiodarone (NEXTERONE) 900 mg in sodium chloride in non-PVC bag 0.9 % 500 mL infusion     artificial tears ophthalmic ointment     aspirin (ASA) Suppository 300 mg     atorvastatin (LIPITOR) tablet 80 mg     cangrelor (KENGREAL) 50 mg in sodium chloride 0.9 % 250 mL infusion     chlorhexidine (PERIDEX) 0.12 % solution 15 mL     dextrose 10% infusion     glucose gel 15-30 g    Or     dextrose 50 % injection 25-50 mL    Or     glucagon injection 1 mg     glucose gel 15-30 g    Or     dextrose 50 % injection 25-50 mL    Or     glucagon injection 1 mg     DOPamine 400 mg in dextrose 5% 250 mL (adult std) - premix - CENTRAL     fentaNYL (PF) (SUBLIMAZE) injection 50 mcg     fentaNYL (SUBLIMAZE) 50 mcg/mL bolus from infusion pump 50 mcg     fentaNYL (SUBLIMAZE) infusion     fondaparinux ANTICOAGULANT (ARIXTRA) injection 2.5 mg     furosemide (LASIX) injection 20 mg     insulin aspart (NovoLOG) injection (RAPID ACTING)     lactated ringers infusion     levETIRAcetam (KEPPRA) 1,000 mg in sodium chloride 0.9 % 100 mL intermittent infusion     lidocaine (LMX4) cream     lidocaine 1 % 0.1-5 mL     Medication Considerations - To maintain platelet inhibition after discontinuation of cangrelor (KENGREAL) [see admin instructions]     medication instruction     Medication Instructions     metoclopramide (REGLAN) injection 10 mg     metoprolol (LOPRESSOR) injection 5 mg     midazolam (VERSED) drip - ADULT 100 mg/100 mL in NS (pre-mix)     midazolam (VERSED) injection 1-3 mg     naloxone (NARCAN) injection 0.2 mg    Or     naloxone (NARCAN) injection 0.4 mg    Or     naloxone (NARCAN) injection 0.2 mg    Or      "naloxone (NARCAN) injection 0.4 mg     norepinephrine (LEVOPHED) 4 mg in  mL infusion PREMIX     ondansetron (ZOFRAN) injection 4 mg     pantoprazole (PROTONIX) IV push injection 40 mg     piperacillin-tazobactam (ZOSYN) 3.375 g vial to attach to  mL bag     propofol (DIPRIVAN) infusion     sodium chloride (PF) 0.9% PF flush 10-20 mL     sodium chloride (PF) 0.9% PF flush 10-40 mL     sodium chloride (PF) 0.9% PF flush 10-40 mL     sodium chloride (PF) 0.9% PF flush 10-40 mL     vecuronium (NORCURON) injection 4.85 mg       Objective:   VITALS:  /62   Pulse 113   Temp 99.7  F (37.6  C) (Bladder)   Resp 25   Ht 1.854 m (6' 1\")   Wt 94.6 kg (208 lb 8.9 oz)   SpO2 90%   BMI 27.52 kg/m    VENT:  Ventilation Mode: CMV/AC  (Continuous Mandatory Ventilation/ Assist Control)  FiO2 (%): 60 %  Rate Set (breaths/minute): 20 breaths/min  Tidal Volume Set (mL): 500 mL  PEEP (cm H2O): 8 cmH2O  Oxygen Concentration (%): 40 %  Resp: 25    EXAM:   Gen: sedated, vented  HEENT: pink conjunctiva, moist mucosa  Neck: no thyromegaly, masses or JVD  Lungs: ronchi both HT  CV: regular, no murmurs or gallops appreciated  Abdomen: soft, distended, NT, BS wnl  Ext: no edema, arterial line and cooling catheter on the groin  Neuro: sedated, unresponsive, pupils reactive to light    Data Review:  Recent Labs   Lab 01/17/22  0823 01/17/22  0409 01/17/22  0405 01/17/22  0023 01/16/22 2037 01/16/22  1706   * 120 114* 108* 98 116*      1/16/2022 05:51   Sodium 145   Potassium 3.6   Chloride 118 (H)   Carbon Dioxide 19 (L)   Urea Nitrogen 23 (H)   Creatinine 0.96   GFR Estimate >90   Calcium 7.5 (L)   Anion Gap 8   Magnesium 1.5 (L)   Albumin 2.6 (L)   Protein Total 4.7 (L)   Bilirubin Total 0.6   Alkaline Phosphatase 75    (H)    (H)   Glucose 113   pH Arterial 7.40   pCO2 Arterial 34 (L)   PO2 Arterial 113 (H)   Bicarbonate Arterial 22 (L)   Base Excess Art -3.8   FIO2 40   Oxyhemoglobin 97.5 (H)   WBC " 18.8 (H)   Hemoglobin 10.9 (L)   Hematocrit 31.9 (L)   Platelet Count 114 (L)         Tracheal Culture 4+ Normal talib       3+ Streptococcus agalactiae (Group B Streptococcus) Abnormal               1/14/2022 13:49 1/14/2022 13:50 1/14/2022 15:37   Lactic Acid POCT  9.5 (HH) 1.5   Troponin I High Sensitivity 1,182 ()       XR CHEST PORT 1 VIEW  DATE/TIME: 1/14/2022 7:25 PM  INDICATION: Endotracheal tube positioning.  COMPARISON: 01/14/2022.                                         IMPRESSION:   ET tube tip roughly 7 cm above the david, near the level of the clavicular heads. Enteric tube tip at the distal esophagus; recommend at least 10 cm of advancement. Hypoexpanded lungs. No significant change of bilateral opacities. No pleural effusion or pneumothorax. Stable cardiomediastinal silhouette    Head CT scan 1/14/2022  IMPRESSION:    1.  No evidence of acute intracranial hemorrhage or mass effect.    Echocardiogram 1/15/22  Interpretation Summary  1.The left ventricle is mildly dilated.  2.Left ventricular function is decreased. The ejection fraction is 30-35%  (moderately reduced).  3.There is anterior wall akinesis.  4.Normal right ventricle size and systolic function.  4.The left atrium is moderately dilated.  5.No hemodynamically significant valvular abnormalities on 2D or color flow  imaging.  6.IVC diameter and respiratory changes fall into an intermediate range  suggesting an RA pressure of 8 mmHg.  7.There is no comparison study available.    XR CHEST PORT 1 VIEW  DATE/TIME: 1/17/2022 11:38 AM  INDICATION: increasing O2 needs  COMPARISON: 01/14/2022  IMPRESSION: Endotracheal tube tip 5.6 cm from david. Nasogastric tube tip projects over the upper stomach. Increasing bilateral pulmonary infiltrates in a bat wing appearance could represent pulmonary edema, pneumonia, hemorrhage, or bilateral pneumoni such as influenza or Covid.  No effusion. Stable normal heart size.    XR ABDOMEN PORT 1 VIEWS  LOCATION: [facility]  DATE/TIME: 1/17/2022 11:40 AM  INDICATION: OG tube placement verification  COMPARISON: None.  IMPRESSION: Nasogastric tube tip in proximal port project over the upper stomach. Nonobstructive gas pattern.     By:  Neville Pastrana MD, 01/17/2022  10:00 AM  Primary Care Physician:  Tha Mandel

## 2022-01-17 NOTE — PROGRESS NOTES
ETT # 7.5 secured at 23 cm at the teeth. Patient continues to be on the vent/full support; current settings: AC 20 500 40% 8. PIP 23 cmH2O, Pplat 20 cmH2O. Sats 100%. BS coarse/diminished; thick/yellow secretions suctioned. RT following.    Elder Meade, RT

## 2022-01-17 NOTE — PROGRESS NOTES
Care Management Follow Up    Length of Stay (days): 3    Expected Discharge Date: 01/24/2022     Concerns to be Addressed:    Requires ICU level of care due to intubation, ventilatory support, and IV medications. Monitor Electrolytes. X Ray pending.   Patient plan of care discussed at interdisciplinary rounds: Yes    Anticipated Discharge Disposition:  To be determined, home if able     Anticipated Discharge Services:  To be determined pending progression and recommendations.   Anticipated Discharge DME:  To be determined.     Patient/family educated on Medicare website which has current facility and service quality ratings:  Not at this time   Education Provided on the Discharge Plan:  Per Team  Patient/Family in Agreement with the Plan:  Yes    Referrals Placed by CM/SW:  None at this time   Private pay costs discussed: Not applicable    Additional Information:  Chart Reviewed. Per discussion in rounds monitor labs, start neb treatments. From home with spouse and is independent at baseline. Works part time as a . No insurance previous care manager made referral to Financial . Main family contacts are daughter Jennifer and spouse. Discharge plan pending progression and recommendations. Care manager to follow.       Julia Sierra RN

## 2022-01-17 NOTE — PROCEDURES
ARTERIAL LINE INSERTION PROCEDURE NOTE  (NON-OR)    Procedure Date:  1/17/2022   Performing Physician:  SRINIVASAN rGullon CNP    Pre-Procedure Diagnosis:     cardiac arrest and RESPIRATORY FAILURE  Post-Procedure Diagnosis:  Same as Pre-Procedure Diagnosis    Procedure:  Arterial line insertion  Left Brachial  Indications:  s/p cardaic arrest and RESPIRATORY FAILURE      Estimated Blood Loss: Minimal   Complications: none      Procedure Details:   The risks, benefits, complications, treatment options, and expected outcomes were discussed with the patient. The risks and potential complications of their problem and purposed procedure include but are not limited to infection, bleeding, pain,  the need for additional procedures, and nerve and vessel injury. The patient/alternate (see above) concurred with the proposed plan, giving informed consent.  The site of the procedure was properly noted/marked. The patient was identified as Jonathan De La Rosa with Date of Birth 1965 and the procedure verified as Arterial Line Insertion.  A Time Out was held and the above information confirmed.        An Marvin test was not  done before the procedure.  In sterile fashion, the line site was prepped with Chlorhexidine.  Strict sterile conditions were maintained,  Cap, mask, and sterile gloves were worn by all participants.  The arterial line was placed in the Left Brachial artery percutaneously,  without  difficulty.  The linewas  sutured in place  and an occlusive sterile dressing was applied.  The total number of needle stick attempts was 1.      Condition: stable    SRINIVASAN Grullon CNP, 1/17/2022, 3:39 PM  [facility]

## 2022-01-17 NOTE — PROGRESS NOTES
HEART CARE NOTE          Assessment/Recommendations     1. VT/VF cardiac arrest and cardiogenic shock  Assessment / Plan    S/p PCI to proximal LAD    Noted to have recurrent non-sustained VT s/p hypothermic protocol and revascularization    Witnessed arrest and bystander CPR with ~ 15 mins down time    Continue amiodarone gtt given NSVT    2. HFrEF/ICM c/b VT/VF arrest  Assessment / Plan    Progressively hypervolemic --> will increase intermittent furosemide to 60 q 8 hrs while continuing to monitor UOP and hemodynamics closely    GDMT on hold given the above    3. CAD/ICM c/b STEMI  Assessment / Plan    Acute anterior MI; S/p PCI to LAD    Continue cangrelor gtt    Awaiting assessment of neurologic recovery    Patient remains critically ill in the ICU s/p cardiac arrest and PCI in the setting of VT/VF; Completing hypothermic protocol and currently on mechanical ventilation while awaiting neurologic recovery. 40 minutes spent on critical care.      History of Present Illness/Subjective    Mr. Jonathan De La Rosa is a 56 year old male with a PMHx significant for HTN and HLD admitted in cardiogenic shock after VT/VF arrest in the setting of acute STEMI.    Today, patient is intubated and sedated    ECG: Personally reviewed. normal sinus rhythm, fusion complexes; acute MI.    ECHO (personnaly Reviewed):   1.The left ventricle is mildly dilated.  2.Left ventricular function is decreased. The ejection fraction is 30-35%  (moderately reduced).  3.There is anterior wall akinesis.  4.Normal right ventricle size and systolic function.  4.The left atrium is moderately dilated.  5.No hemodynamically significant valvular abnormalities on 2D or color flow  imaging.  6.IVC diameter and respiratory changes fall into an intermediate range  suggesting an RA pressure of 8 mmHg.  7.There is no comparison study available.        Physical Examination Review of Systems   /62   Pulse 87   Temp 99.7  F (37.6  C) (Bladder)   Resp 20  "  Ht 1.854 m (6' 1\")   Wt 94.6 kg (208 lb 8.9 oz)   SpO2 100%   BMI 27.52 kg/m    Body mass index is 27.52 kg/m .  Wt Readings from Last 3 Encounters:   01/15/22 94.6 kg (208 lb 8.9 oz)   01/14/22 70 kg (154 lb 5.2 oz)     General Appearance:   Intubated/sedated   ENT/Mouth: membranes moist, no oral lesions or bleeding gums.      EYES:  no scleral icterus, normal conjunctivae   Neck: no carotid bruits or thyromegaly   Chest/Lungs:   lungs are clear to auscultation, no rales or wheezing, equal chest wall expansion    Cardiovascular:   Regular. Normal first and second heart sounds with no murmurs, rubs, or gallops; the carotid, radial and posterior tibial pulses are intact, + LE edema edema bilaterally    Abdomen:  no organomegaly, masses, bruits, or tenderness; bowel sounds are present   Extremities: no cyanosis or clubbing   Skin: no xanthelasma, warm.    Neurologic: Intubated/sedated     Psychiatric: Intubated/sedated     A complete 10 systems ROS was reviewed  And is negative except what is listed in the HPI.          Medical History  Surgical History Family History Social History   No past medical history on file. No past surgical history on file. no family history of premature coronary artery disease Social History     Socioeconomic History     Marital status:      Spouse name: Not on file     Number of children: Not on file     Years of education: Not on file     Highest education level: Not on file   Occupational History     Not on file   Tobacco Use     Smoking status: Not on file     Smokeless tobacco: Not on file   Substance and Sexual Activity     Alcohol use: Not on file     Drug use: Not on file     Sexual activity: Not on file   Other Topics Concern     Not on file   Social History Narrative     Not on file     Social Determinants of Health     Financial Resource Strain: Not on file   Food Insecurity: Not on file   Transportation Needs: Not on file   Physical Activity: Not on file   Stress: " Not on file   Social Connections: Not on file   Intimate Partner Violence: Not on file   Housing Stability: Not on file           Lab Results    Chemistry/lipid CBC Cardiac Enzymes/BNP/TSH/INR   Lab Results   Component Value Date    BUN 26 (H) 01/17/2022     (H) 01/17/2022    CO2 20 (L) 01/17/2022    Lab Results   Component Value Date    WBC 16.2 (H) 01/17/2022    HGB 10.2 (L) 01/17/2022    HCT 30.2 (L) 01/17/2022    MCV 92 01/17/2022    PLT 87 (L) 01/17/2022    Lab Results   Component Value Date    INR 1.26 (H) 01/16/2022     No results found for: CKTOTAL, CKMB, TROPONINI       Weight:    Wt Readings from Last 3 Encounters:   01/15/22 94.6 kg (208 lb 8.9 oz)   01/14/22 70 kg (154 lb 5.2 oz)       Allergies  Allergies   Allergen Reactions     Penicillins Hives and Rash         Surgical History  No past surgical history on file.    Social History  Tobacco:   History   Smoking Status     Not on file   Smokeless Tobacco     Not on file    Alcohol:   Social History    Substance and Sexual Activity      Alcohol use: Not on file   Illicit Drugs:   History   Drug Use Not on file       Family History  No family history on file.       Patricia Darling MD on 1/17/2022      cc: Praveen Loaiza

## 2022-01-17 NOTE — PLAN OF CARE
Problem: Seizure, Active Management  Goal: Absence of Seizure/Seizure-Related Injury  Outcome: No Change   Decreased propofol gtt to 10 at approximately 0400. Around 0420 noted synchronized eyelid movements without stimuli and corresponding eye movements. HR and BP increased at this time as well. Titrated propofol back to 15 and eyelid movements stopped.       Problem: Risk for Delirium  Goal: Improved Attention and Thought Clarity  Outcome: No Change   RASS at -4. RASS goal -1/-2. Decreased sedation. Versed stopped at 2328. Patient remained at -4. Pupils sluggish at 2. No response to any stimuli. Weak gag reflex with ETT suction. Occasional eye fluttering with oral cares.       Problem: Dysrhythmia (Targeted Temperature Management)  Goal: Stable Cardiac Rate and Rhythm  Outcome: No Change   HR 80-90s NSR.  No runs of Vtach noted. Rare PVCs.       Problem: Nutrition Impairment (Mechanical Ventilation, Invasive)  Goal: Optimal Nutrition Delivery  Outcome: No Change   NPO. OG clamped per orders.       Problem: Body Temperature Regulation (Targeted Temperature Management)  Goal: Target Body Temperature Maintained  Intervention: Maintain Target Body Temperature    Normothermia maintained with cooling pads. Temp ranged between 97.9- 99.7. Shivering noted in jaw, deltoid, and chest. PRN fentanyl and versed bumps given with some effect.

## 2022-01-18 NOTE — PROGRESS NOTES
Care Management Follow Up    Length of Stay (days): 4    Expected Discharge Date: 01/24/2022     Concerns to be Addressed:     Requires ICU level of care due to intubation, ventilatory support, and IV medications. Neurology following-MRI pending, consider EEG once off of sedation. Cardiology following-Lasix drip.   Patient plan of care discussed at interdisciplinary rounds: Yes    Anticipated Discharge Disposition:  To be determined, Home if able.      Anticipated Discharge Services:  To be determined pending progression and recommendations.   Anticipated Discharge DME:  To be determined closer to time of discharge    Patient/family educated on Medicare website which has current facility and service quality ratings:  Not needed at this time  Education Provided on the Discharge Plan:  Per team  Patient/Family in Agreement with the Plan:  Yes    Referrals Placed by CM/SW:  None at this time   Private pay costs discussed: Not applicable    Additional Information:  Per discussion in rounds plan for MRI today, work up pending. From home with spouse and is independent at baseline. Works part time as a . No insurance previous care manager made referral to Financial . Main family contacts are daughter Jennifer and spouse. Discharge plan pending progression and recommendations. Care manager to follow.       Jluia Sierra RN

## 2022-01-18 NOTE — PROGRESS NOTES
RT PROGRESS NOTE    VENT DAY# 5    CURRENT SETTINGS:   Ventilation Mode: CMV/AC  (Continuous Mandatory Ventilation/ Assist Control)  FiO2 (%): 50 %  Rate Set (breaths/minute): 16 breaths/min  Tidal Volume Set (mL): 500 mL  PEEP (cm H2O): 10 cmH2O  Oxygen Concentration (%): 50 %  Resp: 19      PATIENT PARAMETERS:  PIP 25  Pplat:  21    ETT SIZE 7.5 Secured at 23 cm at teeth/gums    Respiratory Medications: Albuterol neb BID.      NOTE / SHIFT SUMMARY:   Pt had uneventful night, tolerating well.     Enmanuel Hickman, RT

## 2022-01-18 NOTE — PROGRESS NOTES
"[facility]  Neurology Consultation - Progress Note    Patient Name:  Jonathan De La Rosa  Date of Service:  January 18, 2022    Subjective:    Nurse notes that he has had no more head twitching. However overnight they noticed some eyelid twitching. Described as rapid eye blinking. He has had problems with vent synchrony and propofol has been increased. He did have low-grade temperature to 100.2 this morning    Objective:    Vitals: /62   Pulse 80   Temp 100.2  F (37.9  C)   Resp 16   Ht 1.854 m (6' 1\")   Wt 107.8 kg (237 lb 10.5 oz)   SpO2 93%   BMI 31.35 kg/m    General: Lying in bed, intubated and sedated  Head: Atraumatic, normocephalic   Neurologic:  Exam done on propofol was recently increased due to vent synchrony. Pupils are miotic, and left does react, right with minimal reaction. Extraocular muscles not intact with doll's eyes. He is overbreathing vent. No response to noxious stimuli in all extremities. No increase in tone. Toes are downgoing. No twitching noted on today's exam    Pertinent Investigations:    I have personally reviewed most recent and pertinent labs, tests, and radiological images.     Assessment   Cardiac arrest  #Seizure-like activity  #Possible anoxic injury    56-year-old male with a history of hypertension hyperlipidemia who presented with cardiac arrest. At home CPR was at least 8 minutes. Exam is been unchanged, but has been confounded by sedation due to inability to wean due to vent dyssynchrony. Concern for anoxic injury given time down and questionable absence of some brainstem signs. EEG did note some reactivity. No concerning patterns, but there may have been some residual propofol still present. Low suspicion that his single twitches represent seizure, but can consider repeating EEG if they are worsening. Will order MRI today to look for evidence of anoxic injury, neurology will continue to follow    Recommendations:   -Continue " Keppra 1000 mg twice daily  -MRI brain ordered today  -Can consider repeating EEG when off sedation    Thank you for involving Neurology in the care of Jonathan De La Rosa.      Wilman James, DO

## 2022-01-18 NOTE — PROGRESS NOTES
HEART CARE NOTE          Assessment/Recommendations     1. VT/VF cardiac arrest and cardiogenic shock  Assessment / Plan    S/p PCI to proximal LAD    Noted to have recurrent non-sustained VT s/p hypothermic protocol and revascularization    Witnessed arrest and bystander CPR with ~ 15 mins down time    Continue amiodarone gtt given NSVT     2. HFrEF/ICM c/b VT/VF arrest  Assessment / Plan    Progressively hypervolemic --> inadequate diuresis on current regimen; will start furosemide gtt after bolus while continuing to monitor UOP and hemodynamics closely    GDMT on hold given the above     3. CAD/ICM c/b STEMI  Assessment / Plan    Acute anterior MI; S/p PCI to LAD    Cangrelor gtt transitioned to ticagreloe    Neurology following --> assessment of neurologic recovery in process     Patient remains critically ill in the ICU s/p cardiac arrest and PCI in the setting of VT/VF; Completed hypothermic protocol and currently on mechanical ventilation while awaiting neurologic recovery. 30 minutes spent on critical care.    History of Present Illness/Subjective      Mr. Jonathan De La Rosa is a 56 year old male with a PMHx significant for HTN and HLD admitted in cardiogenic shock after VT/VF arrest in the setting of acute STEMI.     Today, patient is intubated and sedated     ECG: Personally reviewed. normal sinus rhythm, fusion complexes; acute MI.     ECHO (personnaly Reviewed):   1.The left ventricle is mildly dilated.  2.Left ventricular function is decreased. The ejection fraction is 30-35%  (moderately reduced).  3.There is anterior wall akinesis.  4.Normal right ventricle size and systolic function.  4.The left atrium is moderately dilated.  5.No hemodynamically significant valvular abnormalities on 2D or color flow  imaging.  6.IVC diameter and respiratory changes fall into an intermediate range  suggesting an RA pressure of 8 mmHg.  7.There is no comparison study available.          Physical Examination Review of  "Systems   /62   Pulse 82   Temp 99.9  F (37.7  C)   Resp 21   Ht 1.854 m (6' 1\")   Wt 107.8 kg (237 lb 10.5 oz)   SpO2 94%   BMI 31.35 kg/m    Body mass index is 31.35 kg/m .  Wt Readings from Last 3 Encounters:   01/18/22 107.8 kg (237 lb 10.5 oz)   01/14/22 70 kg (154 lb 5.2 oz)     General Appearance:   Intubated/sedated   ENT/Mouth: membranes moist, no oral lesions or bleeding gums.      EYES:  no scleral icterus, normal conjunctivae   Neck: no carotid bruits or thyromegaly   Chest/Lungs:   lungs are clear to auscultation, no rales or wheezing, equal chest wall expansion    Cardiovascular:   Regular. Normal first and second heart sounds with no murmurs, rubs, or gallops; the carotid, radial and posterior tibial pulses are intact, + JVD and LE edema bilaterally    Abdomen:  no organomegaly, masses, bruits, or tenderness; bowel sounds are present   Extremities: no cyanosis or clubbing   Skin: no xanthelasma, warm.    Neurologic: Intubated/sedated     Psychiatric: Intubated/sedated     A complete 10 systems ROS was reviewed  And is negative except what is listed in the HPI.          Medical History  Surgical History Family History Social History   No past medical history on file. Past Surgical History:   Procedure Laterality Date     CV HEART CATHETERIZATION WITH POSSIBLE INTERVENTION N/A 1/14/2022    Procedure: coronary angiogram;  Surgeon: Leo Gallegos MD;  Location:  HEART CARDIAC CATH LAB     CV INTRAVASULAR ULTRASOUND N/A 1/14/2022    Procedure: Intravascular Ultrasound;  Surgeon: Leo Gallegos MD;  Location:  HEART CARDIAC CATH LAB     CV LEFT HEART CATH N/A 1/14/2022    Procedure: Left Heart Cath;  Surgeon: Leo Gallegos MD;  Location:  HEART CARDIAC CATH LAB     CV PCI STENT DRUG ELUTING N/A 1/14/2022    Procedure: Percutaneous Coronary Intervention Stent Drug Eluting;  Surgeon: Leo Gallegos MD;  Location:  HEART CARDIAC CATH LAB     CV THERAPEUTIC HYPOTHERMIA N/A " 1/14/2022    Procedure: Therapeutic Hypothermia;  Surgeon: Leo Gallegos MD;  Location: [facility]ARDIAC CATH LAB     CV THROMBECTOMY CORONARY N/A 1/14/2022    Procedure: Thrombectomy Coronary;  Surgeon: Leo Gallegos MD;  Location: [facility]ARD[facility] LAB    no family history of premature coronary artery disease Social History     Socioeconomic History     Marital status:      Spouse name: Not on file     Number of children: Not on file     Years of education: Not on file     Highest education level: Not on file   Occupational History     Not on file   Tobacco Use     Smoking status: Not on file     Smokeless tobacco: Not on file   Substance and Sexual Activity     Alcohol use: Not on file     Drug use: Not on file     Sexual activity: Not on file   Other Topics Concern     Not on file   Social History Narrative     Not on file     Social Determinants of Health     Financial Resource Strain: Not on file   Food Insecurity: Not on file   Transportation Needs: Not on file   Physical Activity: Not on file   Stress: Not on file   Social Connections: Not on file   Intimate Partner Violence: Not on file   Housing Stability: Not on file           Lab Results    Chemistry/lipid CBC Cardiac Enzymes/BNP/TSH/INR   Lab Results   Component Value Date    TRIG 228 (H) 01/18/2022    BUN 31 (H) 01/18/2022     (H) 01/18/2022    CO2 22 01/18/2022    Lab Results   Component Value Date    WBC 19.5 (H) 01/18/2022    HGB 10.1 (L) 01/18/2022    HCT 30.8 (L) 01/18/2022    MCV 93 01/18/2022    PLT 98 (L) 01/18/2022    Lab Results   Component Value Date    INR 1.26 (H) 01/16/2022     No results found for: CKTOTAL, CKMB, TROPONINI       Weight:    Wt Readings from Last 3 Encounters:   01/18/22 107.8 kg (237 lb 10.5 oz)   01/14/22 70 kg (154 lb 5.2 oz)       Allergies  Allergies   Allergen Reactions     Penicillins Hives and Rash         Surgical History  Past Surgical History:   Procedure Laterality Date     CV HEART  CATHETERIZATION WITH POSSIBLE INTERVENTION N/A 1/14/2022    Procedure: coronary angiogram;  Surgeon: Leo Gallegos MD;  Location:  HEART CARDIAC CATH LAB     CV INTRAVASULAR ULTRASOUND N/A 1/14/2022    Procedure: Intravascular Ultrasound;  Surgeon: Leo Gallegos MD;  Location:  HEART CARDIAC CATH LAB     CV LEFT HEART CATH N/A 1/14/2022    Procedure: Left Heart Cath;  Surgeon: Leo Gallegos MD;  Location:  HEART CARDIAC CATH LAB     CV PCI STENT DRUG ELUTING N/A 1/14/2022    Procedure: Percutaneous Coronary Intervention Stent Drug Eluting;  Surgeon: Leo Gallegos MD;  Location:  HEART CARDIAC CATH LAB     CV THERAPEUTIC HYPOTHERMIA N/A 1/14/2022    Procedure: Therapeutic Hypothermia;  Surgeon: Leo Gallegos MD;  Location:  HEART CARDIAC CATH LAB     CV THROMBECTOMY CORONARY N/A 1/14/2022    Procedure: Thrombectomy Coronary;  Surgeon: Leo Gallegos MD;  Location:  HEART CARDIAC CATH LAB       Social History  Tobacco:   History   Smoking Status     Not on file   Smokeless Tobacco     Not on file    Alcohol:   Social History    Substance and Sexual Activity      Alcohol use: Not on file   Illicit Drugs:   History   Drug Use Not on file       Family History  No family history on file.       Patricia Darling MD on 1/18/2022      cc: Praveen Loaiza

## 2022-01-18 NOTE — PROGRESS NOTES
Respiratory Care Note    Vent day #4    Ventilation Mode: CMV/AC  (Continuous Mandatory Ventilation/ Assist Control)  FiO2 (%): 60 %  Rate Set (breaths/minute): 16 breaths/min  Tidal Volume Set (mL): 500 mL  PEEP (cm H2O): 10 cmH2O  Oxygen Concentration (%): 60 %  Resp: 22    Pt Parameters:     PIP: 25  Pplat: unable to obtain.  Secretions: small yellow    Notes/plan: no recent ABGs, no weaning at this time, titrating FiO2, scheduled nebs.      Gary Awad, RT

## 2022-01-18 NOTE — PROGRESS NOTES
[facility] Pulmonary/Critical Care Consult Team Note    Jonathan De La Rosa,  1965, MRN [id number]  Admitting Dx: Cardiac arrest (H) [I46.9]  Date / Time of Admission:  2022  6:29 PM    Overnight Events:  Intake/Output last 3 shifts:  I/O last 3 completed shifts:  In: 4930.07 [I.V.:3634.07; NG/GT:1219]  Out: 3405 [Urine:3405]  Able to wean off the levo overnight  FiO2 70%    Assessment/Plan: Jonathan De La Rosa is a 56 year old male with PMHx of hypertension and nicotine abuse who was admitted via EMS after suffering an out of hospital [place] cardiac arrest. According to the record he had been experiencing chest pain for approximately 24 hours before he suddenly became unresponsive. His wife was present and initiated CPR and called 911. He had a shockable rhythm on EMS arrival and he was treated with defibrillation 3 times before ROSC. He was then transferred to [facility] where he was found to have profound ST elevation in anterior leads and taken to the cath lab. He was found to have acute thrombotic occlusion of the proximal LAD that was treated with PCI and placement of a JUANJ OSE. He was initiated on cooling protocol and transferred to [facility] ICU    PULM/ID: Acute resp failure due to cardiac arrest intubated   - LAST VENT SETTINGS: Ventilation Mode: CMV/AC  (Continuous Mandatory Ventilation/ Assist Control)  FiO2 (%): 70 %  Rate Set (breaths/minute): 16 breaths/min  Tidal Volume Set (mL): 500 mL  PEEP (cm H2O): 10 cmH2O  Oxygen Concentration (%): 70 %  Resp: 16  6. Aspiration pneumonia  - CXR showed increase bilateral pulmonary infiltrates  - Tracheal culture is growing strep agalactiae and staph aureus.   - Scheduled bronchodilators.   - Continue zosyn IV, vancomycin    CV: S/p VT/VFib cardiac arrest 22 due to STEMI s/p JUAN JOSE   - Hypothermia protocol and Patient was rewarmed on 22 at 1 AM.  - Monitor on tele  - Follow up echocardiogram EF 30-35%, anterior wall akinesis, moderate dilated  LA, normal RV size.-   - Start B-blockers, continue ASA, statins,   - On cangrelor due to concerns of GI absorption of meds. Patient has bowel sounds, resume Ticagrelor.   - Cardiology following.     GI: NPO  - consult dietary for tube feed recs  - GI proph    RENAL:   - furosemide increased to 60 q 8 hrs 1/17, switched to lasix gtt this morning  - baseline Cr of 0.8  - Follow BUN/Creatinine  - strict I/O's    NEURO: cardiac arrest concerning for anoxic brain injury  - Head CT scan was negative. Continue propofol and versed drip.   - Keppra IV. Neurology is following.    - MRI Brain today    ENDO:-   - Critical Care hyperglycemic protocol  - Accuchecks and sliding scale q4hrs    Pt has critical illness and impairs breathing on vent such as there is high probability of imminent of life threatening deterioration in the patient's condition.    Code Status: FULL CODE    Infusions:    amiodarone 0.5 mg/min (01/18/22 1124)     dextrose       dextrose       DOPamine Stopped (01/16/22 0729)     fentaNYL 75 mcg/hr (01/18/22 1000)     furosemide (LASIX) infusion ADULT STANDARD 15 mg/hr (01/18/22 1124)     lactated ringers 25 mL/hr at 01/18/22 1000     - MEDICATION INSTRUCTIONS -       - MEDICATION INSTRUCTIONS -       - MEDICATION INSTRUCTIONS -       midazolam Stopped (01/16/22 9118)     norepinephrine Stopped (01/18/22 0916)     propofol (DIPRIVAN) infusion 40 mcg/kg/min (01/18/22 1000)       ICU DAILY CHECKLIST           Can patient transfer out of MICU? no  FAST HUG:  Feeding:  Feeding: Yes  Duarte:{Yes  Analgesia/Sedation:Yes  Thromboembolic prophylaxis:SCDs  HOB>30:  Yes  Stress Ulcer Protocol Active: Yes; Mode: PPI/H2 Antagonist  Glycemic Control: No components found for: GLU Any glucose > 180 yes; Mode of Insulin Therapy: Sliding Scale Insulin  INTUBATED:  Can patient have daily waking: no  Can patient have spontaneous breathing trial: no;  Does pt have restraints: MD RESTRAINT FOR NON-VIOLENT BEHAVIOR FACE TO FACE  "EVALUATION Patient's Immediate Situation: Patient demonstrated the following behaviors: Impulsive behavior, grabbing at support tubes/lines.  Patient's Reaction to the intervention Does patient understand the reason for restraint/seclusion? Unable to Express Medical Condition Is there any evidence of compromise of Skin integrity, Respiratory, Cardiovascular, Musculoskeletal, Hydration? No Behavioral ConditionIn consultation with the RN, is there a need to continue this restraint or seclusion? Yes See Restraint Flowsheet for complete restraint documentation and assessment.  PHYSICAL THERAPY AND MOBILITY: Can patient have PT and mobility trial: no Activity: ICU mobility protocol    Critical Care Time excluding procedures and family discussions greater than: 1 Hour    Risk Factors Present on Admission:  Clinically Significant Risk Factors Present on Admission               # Obesity: last Body mass index is 31.35 kg/m .          Belinda Sarkar DO  Pulmonary and Critical Care Attending  pgr [phone]    Allergies   Allergen Reactions     Penicillins Hives and Rash       Meds: See MAR    Physical Exam:  /62   Pulse 79   Temp 100.2  F (37.9  C)   Resp 16   Ht 1.854 m (6' 1\")   Wt 107.8 kg (237 lb 10.5 oz)   SpO2 94%   BMI 31.35 kg/m    Intake/Output this shift:  I/O this shift:  In: 1065.18 [I.V.:705.18; NG/GT:360]  Out: 500 [Urine:500]  GEN: Intubated and sedated, NAD  HEENT: et tube in place, OG tube in place  CVS: regular rhythm, no murmurs  RESP: CTA BL, mechanical breath sounds  ABD: Soft, No abdominal pain with palpation, no guarding, no rigidity  EXT: Warm, well perfused, no edema  NEURO:  sedated  PSYCH: sedated    Pertinent Labs: Latest lab results in EHR personally reviewed.   CMP  Recent Labs   Lab 01/18/22  0900 01/18/22  0420 01/18/22  0350 01/17/22  2355 01/17/22  1157 01/17/22  0829 01/17/22  0823 01/17/22  0409 01/16/22  0837 01/16/22  0551 01/15/22  1511 01/15/22  1503 01/15/22  0820 " 01/15/22  0819 01/15/22  0505 01/15/22  0405 01/14/22  2112 01/14/22  1349   NA  --  146*  --   --   --   --   --  148*  --  145  --  144  --   --   --  140   < > 140   POTASSIUM 3.2* 3.1*  --   --   --  4.1  --  3.2*   < > 3.6  3.6   < > 3.8   < >  --   --  3.2*   < > 3.2*   CHLORIDE  --  116*  --   --   --   --   --  118*  --  118*  --  117*  --   --   --  116*   < > 108   CO2  --  22  --   --   --   --   --  20*  --  19*  --  20*  --   --   --  15*   < > 15*   ANIONGAP  --  8  --   --   --   --   --  10  --  8  --  7  --   --   --  9   < > 17*   * 147* 129* 125*   < >  --    < > 120   < > 113   < > 103   < >  --    < > 172*   < > 300*   BUN  --  31*  --   --   --   --   --  26*  --  23*  --  26*  --   --   --  25*   < > 20   CR  --  1.10  --   --   --   --   --  1.02  --  0.96  --  1.03  --   --   --  1.40*   < > 1.43*   GFRESTIMATED  --  79  --   --   --   --   --  86  --  >90  --  85  --   --   --  59*   < > 58*   NATACHA  --  8.0*  --   --   --   --   --  7.8*  --  7.5*  --  7.4*  --   --   --  7.5*   < > 8.9   MAG  --  2.1  --   --   --   --   --  2.3  --  1.5*  --   --   --  1.9  --   --   --  2.7*   PHOS  --  1.6*  --   --   --   --   --   --   --   --   --   --   --   --   --   --   --   --    PROTTOTAL  --   --   --   --   --   --   --  5.0*  --  4.7*  --   --   --   --   --  5.4*  --  7.3   ALBUMIN  --   --   --   --   --   --   --  2.4*  --  2.6*  --   --   --   --   --  2.9*  --  3.6   BILITOTAL  --   --   --   --   --   --   --  0.7  --  0.6  --   --   --   --   --  0.4  --  0.6   ALKPHOS  --   --   --   --   --   --   --  74  --  75  --   --   --   --   --  100  --  148   AST  --   --   --   --   --   --   --  131*  --  215*  --   --   --   --   --  578*  --  175*   ALT  --   --   --   --   --   --   --  123*  --  166*  --   --   --   --   --  280*  --  242*    < > = values in this interval not displayed.     CBC  Recent Labs   Lab 01/18/22  0420 01/17/22  0408 01/16/22  0551 01/15/22  1500  01/15/22  0405   WBC 19.5* 16.2* 18.8*  --  24.1*   RBC 3.33* 3.29* 3.51*  --  4.47   HGB 10.1* 10.2* 10.9* 12.7* 13.7   HCT 30.8* 30.2* 31.9*  --  40.6   MCV 93 92 91  --  91   MCH 30.3 31.0 31.1  --  30.6   MCHC 32.8 33.8 34.2  --  33.7   RDW 14.7 14.5 14.0  --  13.4   PLT 98* 87* 114*  --  182     INR  Recent Labs   Lab 01/16/22  0854 01/16/22  0551 01/16/22  0004 01/15/22  0958   INR 1.26* 1.24* 1.29* 1.14     Arterial Blood Gas  Recent Labs   Lab 01/16/22  0551 01/15/22  1510 01/15/22  0959 01/15/22  0819 01/14/22  2114 01/14/22  1350 01/14/22  1349   PH 7.40 7.38 7.27* 7.34* 7.35*   < >  --    PCO2 34* 34* 41  --  35   < >  --    PO2 113* 158* 142*  --  204*  --   --    HCO3 22* 21* 18*  --  20*   < >  --    O2PER 40 40 40  --   --   --  100    < > = values in this interval not displayed.       Cultures: personally reviewed.   7-Day Micro Results    Collected Updated Procedure Result Status    01/14/2022 2240 01/18/2022 1003 Blood Culture Peripheral Blood [[id number]]   Peripheral Blood    Preliminary result Component Value   Culture No growth after 3 days P              01/14/2022 2119 01/18/2022 0824 Respiratory Aerobic Bacterial Culture [[id number]]   (Abnormal)   Washings from Trachea    Preliminary result Component Value   Culture 4+ Normal talib P    3+ Streptococcus agalactiae (Group B Streptococcus) Abnormal  P    This organism is susceptible to ampicillin, penicillin, vancomycin and the cephalosporins. If treatment is required and your patient is allergic to penicillin, contact the microbiology lab within 5 days to request susceptibility testing.    3+ Staphylococcus aureus Abnormal  P              01/14/2022 1415 01/14/2022 1547 Asymptomatic COVID-19 Virus (Coronavirus) by PCR Nasopharyngeal [[id number]]    Swab from Nasopharyngeal    Final result Component Value   SARS CoV2 PCR Negative   NEGATIVE: SARS-CoV-2 (COVID-19) RNA not detected, presumed negative.          Imaging: personally  reviewed.   Results for orders placed or performed during the hospital encounter of 01/14/22   XR Chest Port 1 View    Narrative    EXAM: XR CHEST PORT 1 VIEW  LOCATION: [facility]  DATE/TIME: 1/14/2022 7:25 PM    INDICATION: Endotracheal tube positioning.  COMPARISON: 01/14/2022.      Impression    IMPRESSION:     ET tube tip roughly 7 cm above the david, near the level of the clavicular heads. Enteric tube tip at the distal esophagus; recommend at least 10 cm of advancement.     Hypoexpanded lungs. No significant change of bilateral opacities. No pleural effusion or pneumothorax. Stable cardiomediastinal silhouette.       CT Head w/o Contrast    Narrative    EXAM: CT HEAD W/O CONTRAST  LOCATION: [facility]  DATE/TIME: 1/14/2022 8:07 PM    INDICATION: Cardiac arrest.  COMPARISON: None.  TECHNIQUE: Routine CT Head without IV contrast. Multiplanar reformats. Dose reduction techniques were used.    FINDINGS:  INTRACRANIAL CONTENTS: No evidence of acute intracranial hemorrhage or mass effect. Brain attenuation morphology are normal. The ventricles and sulci are normal for age. Normal gray-white matter differentiation. The basilar cisterns are patent.    VISUALIZED ORBITS/SINUSES/MASTOIDS: The globes are unremarkable. The partially imaged paranasal sinuses, mastoid air cells and middle ear cavities are unremarkable.     BONES/SOFT TISSUES: The visualized skull base and calvarium are unremarkable.      Impression    IMPRESSION:    1.  No evidence of acute intracranial hemorrhage or mass effect.   XR Chest Port 1 View    Narrative    EXAM: XR CHEST PORT 1 VIEW  LOCATION: [facility]  DATE/TIME: 1/17/2022 11:38 AM    INDICATION: increasing O2 needs  COMPARISON: 01/14/2022      Impression    IMPRESSION: Endotracheal tube tip 5.6 cm from david. Nasogastric tube tip projects over the upper stomach. Increasing bilateral pulmonary infiltrates in a bat  wing appearance could represent pulmonary edema, pneumonia, hemorrhage, or bilateral pneumonia   such as influenza or Covid.    No effusion. Stable normal heart size.   XR Abdomen Port 1 View    Narrative    EXAM: XR ABDOMEN PORT 1 VIEWS  LOCATION: [facility]  DATE/TIME: 1/17/2022 11:40 AM    INDICATION: OG tube placement verification  COMPARISON: None.      Impression    IMPRESSION: Nasogastric tube tip in proximal port project over the upper stomach. Nonobstructive gas pattern.    XR Chest Port 1 View    Narrative    EXAM: XR CHEST PORT 1 VIEW  LOCATION: [facility]  DATE/TIME: 1/18/2022 6:17 AM    INDICATION: Location of ET tube  COMPARISON: 01/17/2022.      Impression    IMPRESSION: Endotracheal tube tip is 6.5 cm above the david. PICC catheter from right upper extremity approach is in the mid superior vena cava. Enteric tube tip is in the stomach. Again noted are bilateral perihilar airspace opacities consistent with   edema or bilateral pneumonia there has been slight increased consolidation at the left base behind the heart. No pneumothorax or pleural effusion.   Echocardiogram Complete     Value    LVEF  30-35% (moderately reduced)    Narrative    [id number]  [id number]  [id number]    ______________________________________________________________________________  Procedure  Definity ([facility] #23829-080) given intravenously. No hemodynamically significant  valvular abnormalities on 2D or color flow imaging. There is no comparison  study available.  ______________________________________________________________________________  Interpretation Summary     1.The left ventricle is mildly dilated.  2.Left ventricular function is decreased. The ejection fraction is 30-35%  (moderately reduced).  3.There is anterior wall akinesis.  4.Normal right ventricle size and systolic function.  4.The left atrium is moderately dilated.  5.No hemodynamically significant valvular  abnormalities on 2D or color flow  imaging.  6.IVC diameter and respiratory changes fall into an intermediate range  suggesting an RA pressure of 8 mmHg.  7.There is no comparison study available.  ______________________________________________________________________________  I   Left Ventricle  The left ventricle is mildly dilated. Left ventricular function is decreased.  The ejection fraction is 30-35% (moderately reduced). There is borderline  concentric left ventricular hypertrophy. Left ventricular diastolic function  is normal. There is anterior wall akinesis.     Right Ventricle  Normal right ventricle size and systolic function.     Atria  The left atrium is moderately dilated. Right atrial size is normal. There is  no color Doppler evidence of an atrial shunt.     Mitral Valve  Mitral valve leaflets appear normal. There is no evidence of mitral stenosis  or clinically significant mitral regurgitation. There is trace mitral  regurgitation. There is no mitral valve stenosis.     Tricuspid Valve  The tricuspid valve is not well visualized, but is grossly normal. Right  ventricle systolic pressure estimate normal. There is physiologic tricuspid  regurgitation. There is no tricuspid stenosis.     Aortic Valve  Aortic valve leaflets appear normal. There is no evidence of aortic stenosis  or clinically significant aortic regurgitation. The aortic valve is not well  visualized. No aortic regurgitation is present. No aortic stenosis is present.     Pulmonic Valve  The pulmonic valve is not well seen, but is grossly normal. The pulmonic valve  is not well visualized. This degree of valvular regurgitation is within normal  limits. There is no pulmonic valvular stenosis.     Vessels  The aorta root is normal. Normal size ascending aorta. IVC diameter and  respiratory changes fall into an intermediate range suggesting an RA pressure  of 8 mmHg.     Pericardium  There is no pericardial effusion.     Rhythm  Sinus rhythm  was noted.     ______________________________________________________________________________  _________________________________________________  Report approved by: Doyle Hilario 01/15/2022 01:43 PM             Patient Active Problem List   Diagnosis     Cardiac arrest (H)       Belinda Sarkar,   Pulmonary and Critical Care Attending  [facility] [phone]

## 2022-01-18 NOTE — PLAN OF CARE
Problem: Communication Impairment (Mechanical Ventilation, Invasive)  Goal: Effective Communication  Outcome: Improving     Problem: Device-Related Complication Risk (Mechanical Ventilation, Invasive)  Goal: Optimal Device Function  Outcome: Improving

## 2022-01-18 NOTE — SIGNIFICANT EVENT
PT back from MRI without completing exam due to hypertension and significant hypoxia on transport vent. Pt rebounded back on original vent settings. MD notified.     At 1725, pt became suddenly hypoxic again sats to 85% on 100%FiO2. Began bagging pt, getting sats to 89%. RT and MD called to room. Sedation boluses and vecuronium given as well as bronching pt. Peep to 14. US called stat to room, heparin started per Dr. Snowden.     Sats now 91% on 100% FiO2.

## 2022-01-19 PROBLEM — E78.00 HYPERCHOLESTEROLEMIA: Status: ACTIVE | Noted: 2018-09-26

## 2022-01-19 PROBLEM — H52.223 REGULAR ASTIGMATISM OF BOTH EYES: Status: ACTIVE | Noted: 2018-09-27

## 2022-01-19 NOTE — PROGRESS NOTES
CLINICAL NUTRITION SERVICES - Brief Note     Nutrition Prescription    RECOMMENDATIONS FOR MDs/PROVIDERS TO ORDER:  Reglan to start.    Malnutrition Status:    none    Recommendations already ordered by Registered Dietitian (RD):  Adjust TF to 30 ml/hr, if tolerates increase 10 ml every 8 hrs to goal 65 ml/hr.      Future/Additional Recommendations:  Monitor hydration, GI tolerance, wt.     EVALUATION OF THE PROGRESS TOWARD GOALS   Diet: NPO  Nutrition Support: Jevity 1.2 goal rate 65 ml/hr  2 pkts prosource/day   ml every 4 hrs.  Intake: Jevity 1.2 Calin @ goal of  65ml/hr  (1560ml/day)  will provide: 1952 kcals, 108 g PRO, 1258 ml free H20, 1200 ml flushes, 264 g CHO, and 27 g fiber daily.  Propofol provided 574 calories/24 hrs.     NEW FINDINGS   TF held for elevated gastric residual overnight 450 mls.  No BM since admission.  Plan to decrease TF rate and MD plans to start reglan to help GI motility.  Awaiting labs.  Na was improved yesterday with increased free water.  Fluid wt up +9.3L.  Pt on IV lasix, propofol and levophed.

## 2022-01-19 NOTE — PROGRESS NOTES
NEUROLOGY INPATIENT PROGRESS NOTE       [facility] NEUROLOGY [place]  [address] [place] [address]  Tel: [phone]  Fax: [phone]  [email or web address]     ASSESSMENT & PLAN   Hospital Day#: 5  Visit diagnosis: Anoxic encephalopathy    Anoxic encephalopathy  56 years old male with history of HTN, HLD admitted after patient had a cardiac arrest and was found to have LAD occlusion that was treated with drug-eluting stent.    CT of the head unremarkable    EEG shows diffuse slowing in theta and delta range but no periodic discharges seen to suggest seizures    Patient on Keppra 1000 mg every 12 hours.  Check Keppra level    Unable to do MRI scan, will repeat CT head    Repeat EEG    Check serum neuronal specific enolase    Neurology Discharge Planning :   SOHAIL De La Cruz MD  [facility]  [facility], [facility]     PROBLEM LIST      Patient Active Problem List   Diagnosis Code     Cardiac arrest (H) I46.9     Essential hypertension I10     Hypercholesterolemia E78.00     Regular astigmatism of both eyes H52.223     Past Medical History:   Patient  has no past medical history on file.     SUBJECTIVE     Patient intubated and sedated no seizure activity reported.  MRI could not be performed as patient developed worsening hypoxia     OBJECTIVE     Vital signs in last 24 hours  Temp:  [98  F (36.7  C)-100.2  F (37.9  C)] 98.2  F (36.8  C)  Pulse:  [66-98] 67  Resp:  [10-31] 16  MAP:  [59 mmHg-109 mmHg] 72 mmHg  Arterial Line BP: ()/(47-83) 95/58  FiO2 (%):  [60 %-100 %] 80 %  SpO2:  [85 %-98 %] 96 %    Weight:   Wt Readings from Last 1 Encounters:   01/18/22 107.8 kg (237 lb 10.5 oz)         I/O last 24 hours    Intake/Output Summary (Last 24 hours) at 1/18/2022 1909  Last data filed at 1/18/2022 1849  Gross per 24 hour   Intake 6070.42 ml   Output 7050 ml   Net -979.58 ml     Review of Systems   Pertinent items are noted  in HPI.    General Physical Exam: Patient is alert and oriented x 0. Vital signs were reviewed and are documented in EMR. Neck was supple, no Carotid bruit, thyromegaly, JVD or lymphadenopathy noted.  Neurological Exam:  Patient alert and oriented x0 sedated and intubated pupil small and nonreactive.  Doll's eyes are absent.  .  Patient is assisting the vent.  No withdrawal to painful stimuli.  Reflexes 1+, equivocal toes downgoing.     DIAGNOSTIC STUDIES     Pertinent Radiology   Following imaging studies were reviewed:    CT  No evidence of acute intracranial hemorrhage or mass effect.    EEG  This is an abnormal EEG due to diffusely slow background in theta and delta range that suggests nonspecific generalized cerebral dysfunction that intermittently reacts to alerting procedure which is a good prognostic sign.  Such slowing can be seen due to metabolic and toxic abnormalities.  No periodic discharges or a burst suppression pattern was noted to suggest anoxic brain damage.  If clinically indicated, a repeat tracing when patient is off sedating drugs will be helpful in evaluating for any cortical abnormality.     Echocardiogram  Echo result w/o MOPS: Interpretation Summary 1.The left ventricle is mildly dilated.2.Left ventricular function is decreased. The ejection fraction is 30-35%(moderately reduced).3.There is anterior wall akinesis.4.Normal right ventricle size and systolic function.4.The left atrium is moderately dilated.5.No hemodynamically significant valvular abnormalities on 2D or color flowimaging.6.IVC diameter and respiratory changes fall into an intermediate rangesuggesting an RA pressure of 8 mmHg.7.There is no comparison study available.    Pertinent Labs   Lab Results: Personally Reviewed  Recent Results (from the past 24 hour(s))   Potassium    Collection Time: 01/18/22  9:00 AM   Result Value Ref Range    Potassium 3.2 (L) 3.5 - 5.0 mmol/L   Glucose by meter    Collection Time: 01/18/22  9:00 AM    Result Value Ref Range    GLUCOSE BY METER POCT 131 (H) 70 - 99 mg/dL   Potassium    Collection Time: 01/18/22  3:35 PM   Result Value Ref Range    Potassium 2.9 (L) 3.5 - 5.0 mmol/L   Glucose by meter    Collection Time: 01/18/22  3:36 PM   Result Value Ref Range    GLUCOSE BY METER POCT 166 (H) 70 - 99 mg/dL   CBC with platelets    Collection Time: 01/18/22  6:46 PM   Result Value Ref Range    WBC Count 18.8 (H) 4.0 - 11.0 10e3/uL    RBC Count 3.50 (L) 4.40 - 5.90 10e6/uL    Hemoglobin 10.6 (L) 13.3 - 17.7 g/dL    Hematocrit 32.1 (L) 40.0 - 53.0 %    MCV 92 78 - 100 fL    MCH 30.3 26.5 - 33.0 pg    MCHC 33.0 31.5 - 36.5 g/dL    RDW 14.9 10.0 - 15.0 %    Platelet Count 112 (L) 150 - 450 10e3/uL   Glucose by meter    Collection Time: 01/18/22  7:55 PM   Result Value Ref Range    GLUCOSE BY METER POCT 139 (H) 70 - 99 mg/dL   Heparin Unfractionated Anti Xa Level    Collection Time: 01/18/22 11:28 PM   Result Value Ref Range    Anti Xa Unfractionated Heparin 0.47 For Reference Range, See Comment IU/mL   Glucose by meter    Collection Time: 01/18/22 11:37 PM   Result Value Ref Range    GLUCOSE BY METER POCT 168 (H) 70 - 99 mg/dL   Potassium    Collection Time: 01/19/22  1:18 AM   Result Value Ref Range    Potassium 4.3 3.5 - 5.0 mmol/L   Glucose by meter    Collection Time: 01/19/22  3:54 AM   Result Value Ref Range    GLUCOSE BY METER POCT 197 (H) 70 - 99 mg/dL   Magnesium    Collection Time: 01/19/22  5:28 AM   Result Value Ref Range    Magnesium 2.2 1.8 - 2.6 mg/dL   Heparin Unfractionated Anti Xa Level    Collection Time: 01/19/22  5:28 AM   Result Value Ref Range    Anti Xa Unfractionated Heparin 0.43 For Reference Range, See Comment IU/mL   Phosphorus    Collection Time: 01/19/22  5:28 AM   Result Value Ref Range    Phosphorus 4.4 2.5 - 4.5 mg/dL   Potassium    Collection Time: 01/19/22  5:28 AM   Result Value Ref Range    Potassium 4.0 3.5 - 5.0 mmol/L   AST    Collection Time: 01/19/22  5:28 AM   Result Value  Ref Range    AST 37 0 - 40 U/L   ALT    Collection Time: 01/19/22  5:28 AM   Result Value Ref Range    ALT 56 (H) 0 - 45 U/L   CBC with platelets    Collection Time: 01/19/22  5:28 AM   Result Value Ref Range    WBC Count 15.2 (H) 4.0 - 11.0 10e3/uL    RBC Count 3.16 (L) 4.40 - 5.90 10e6/uL    Hemoglobin 9.8 (L) 13.3 - 17.7 g/dL    Hematocrit 29.4 (L) 40.0 - 53.0 %    MCV 93 78 - 100 fL    MCH 31.0 26.5 - 33.0 pg    MCHC 33.3 31.5 - 36.5 g/dL    RDW 15.0 10.0 - 15.0 %    Platelet Count 107 (L) 150 - 450 10e3/uL         HOSPITAL MEDICATIONS       sodium chloride 0.9%  1,000 mL Intravenous Once     albuterol  2.5 mg Nebulization 2 times daily     artificial tears   Both Eyes Q8H     aspirin  81 mg Oral or Feeding Tube Daily     atorvastatin  80 mg Per OG Tube QPM     carvedilol  12.5 mg Oral or Feeding Tube BID     chlorhexidine  15 mL Mouth/Throat Q12H     insulin aspart  1-6 Units Subcutaneous Q4H     levETIRAcetam  1,000 mg Intravenous Q12H     pantoprazole (PROTONIX) IV  40 mg Intravenous QAM AC     piperacillin-tazobactam  3.375 g Intravenous Q8H     protein modular  1 packet Per Feeding Tube BID     sodium chloride (PF)  10-40 mL Intracatheter Q7 Days     ticagrelor  90 mg Oral or Feeding Tube BID     vancomycin  1,000 mg Intravenous Q12H        Total time spent for face to face visit, reviewing labs/imaging studies, counseling and coordination of care was: 30 Minutes More than 50% of this time was spent on counseling and coordination of care.        This note was dictated using voice recognition software.  Any grammatical or context distortions are unintentional and inherent to the software.

## 2022-01-19 NOTE — PLAN OF CARE
Problem: Hemodynamic Instability (Targeted Temperature Management)  Goal: Effective Tissue Perfusion  Outcome: No Change     Problem: Infection (Targeted Temperature Management)  Goal: Absence of Infection Signs and Symptoms  Outcome: No Change     Problem: Nutrition Impairment (Mechanical Ventilation, Invasive)  Goal: Optimal Nutrition Delivery  Outcome: No Change     Pt remains intubated and sedated. No seizure actively on shift. Pt not tolerating turns well at beginning of shift and requiring increase to levophed to maintain map >65, but able to decrease levophed needs by end of shift. Scant amount of secretions. UO slowed down throughout shift despite lasix gtt - DR Cameron notified.     More Fuentes RN

## 2022-01-19 NOTE — PROGRESS NOTES
ETT # 7.5 secured at 25 cm at the teeth. Pt remains vented/full support. Current settings: AC 16 500 80% 14. sats 96%. BS decreased. Changes are not made. RT following.    Elder Meade, RT

## 2022-01-19 NOTE — PROGRESS NOTES
Critical Care Addendum    Patient developed acutely worsening hypoxia this afternoon, unable to tolerate brain MRI. Stat CXR noted ETT to be quite high, performed bronchoscopy with ETT shown to be in the trachea, but did advance 2 cm for better positioning. No PTX. Does have bilateral pulmonary edema vs CPR-related contusions vs aspiration pneumonitis. Good UOP with diuretics, PEEP increased from 10 to 14, FiO2 100%, SpO2 still 87-91%. Bedside US showed large clot in transit in IVC. RV not dilated. Too hypoxic to transport for chest CTA. Stopped fondaparinux and started high-intensity heparin drip. Stat US IVC and US venous bilateral legs. Discussed with wife, discussed possible need for alteplase but hoping to avoid given risk of severe hemorrhage. Remains full code.    Additional critical care time exclusive of procedures: 40 minutes    Mikey Snowden MD  Pulmonary and Critical Care Medicine  [facility]  Pager [phone]  Office [phone]  (he/him/his)

## 2022-01-19 NOTE — PROGRESS NOTES
Respiratory Care Note    Vent day #5    Ventilation Mode: CMV/AC  (Continuous Mandatory Ventilation/ Assist Control)  FiO2 (%): 80 %  Rate Set (breaths/minute): 16 breaths/min  Tidal Volume Set (mL): 500 mL  PEEP (cm H2O): 14 cmH2O  Oxygen Concentration (%): 80 %  Resp: 18    Pt Parameters:     PIP: 28  Pplat: unable to obtain.  Secretions: mod red streaked pink    Notes/plan: tried to go to MRI with patient today started to desat 85-86% on 100% FiO2. SpO2 wasn't increasing so transported pt back to room and placed on vent. Pt emergently bronched to confirm tube position. ETT marking advanced from 23 to 25 at teeth per MD. Moderate red streaked secretions suctioned. SpO2 high 80s post procedure.       Gary Awad, RT

## 2022-01-19 NOTE — PROGRESS NOTES
[facility] Pulmonary/Critical Care Consult Team Note    Jonathan De La Rosa,  1965, MRN [id number]  Admitting Dx: Cardiac arrest (H) [I46.9]  Date / Time of Admission:  2022  6:29 PM    Overnight Events:  Intake/Output last 3 shifts:  I/O last 3 completed shifts:  In: 6676.52 [I.V.:4916.52; NG/GT:1760]  Out: 5925 [Urine:5925]  Hypoxic event overnight while traveling to Radiology for MRI  Pt had to undergo bronchoscopy and adjustment of the et tube  Now back to 70% FiO2  US showed IVC Clot and DVTs  Stopped fondaparinux and started on high-intensity heparin drip    Assessment/Plan: Jonathan De La Rosa is a 56 year old male with PMHx of hypertension and nicotine abuse who was admitted via EMS after suffering an out of hospital [place] cardiac arrest. According to the record he had been experiencing chest pain for approximately 24 hours before he suddenly became unresponsive. His wife was present and initiated CPR and called 911. He had a shockable rhythm on EMS arrival and he was treated with defibrillation 3 times before ROSC. He was then transferred to [facility] where he was found to have profound ST elevation in anterior leads and taken to the cath lab. He was found to have acute thrombotic occlusion of the proximal LAD that was treated with PCI and placement of a JUAN JOSE. He was initiated on cooling protocol and transferred to [facility] ICU    PULM/ID: Acute resp failure due to cardiac arrest intubated   - LAST VENT SETTINGS: Ventilation Mode: CMV/AC  (Continuous Mandatory Ventilation/ Assist Control)  FiO2 (%): (S) 65 %  Rate Set (breaths/minute): 16 breaths/min  Tidal Volume Set (mL): 500 mL  PEEP (cm H2O): 14 cmH2O  Oxygen Concentration (%): 70 %  Resp: 16  6. Aspiration pneumonia  - CXR showed increase bilateral pulmonary infiltrates  - Tracheal culture is growing strep agalactiae and staph aureus.   - Scheduled bronchodilators.   - Continue zosyn IV, vancomycin  - s/p bronchoscopy on     CV: S/p  VT/VFib cardiac arrest 1/14/22 due to STEMI s/p JUAN JOSE 1/14  - Hypothermia protocol and Patient was rewarmed on 1/16/22 at 1 AM.  - Monitor on tele  - Follow up echocardiogram EF 30-35%, anterior wall akinesis, moderate dilated LA, normal RV size.-   - continue ASA, statins, Ticagrelor  - holding bblockers due to hypotension on levophed  - Cardiology following.     GI: NPO  - consulted dietary for tube feed recs  - GI proph    Heme: IVC Clot and bilateral occlusive DVT in peroneal veins  - on high intensity heparin    RENAL:   - furosemide increased to 60 q 8 hrs 1/17, switched to lasix gtt this morning  - baseline Cr of 0.8  - Follow BUN/Creatinine  - strict I/O's    NEURO: cardiac arrest concerning for anoxic brain injury  - Head CT scan was negative. Continue propofol and versed drip.   - Keppra IV. Neurology is following.    - MRI Brain was unable to be done, too unstable  - Neuro recs    ENDO:-   - Critical Care hyperglycemic protocol  - Accuchecks and sliding scale q4hrs    Pt has critical illness and impairs breathing on vent such as there is high probability of imminent of life threatening deterioration in the patient's condition.    Code Status: FULL CODE    Infusions:    dextrose       dextrose       DOPamine Stopped (01/16/22 0729)     fentaNYL 150 mcg/hr (01/19/22 0439)     furosemide (LASIX) infusion ADULT STANDARD 15 mg/hr (01/19/22 0956)     heparin 1,800 Units/hr (01/19/22 0451)     lactated ringers 25 mL/hr at 01/18/22 9853     - MEDICATION INSTRUCTIONS -       - MEDICATION INSTRUCTIONS -       - MEDICATION INSTRUCTIONS -       midazolam Stopped (01/16/22 1738)     norepinephrine 0.04 mcg/kg/min (01/19/22 1031)     propofol (DIPRIVAN) infusion 60 mcg/kg/min (01/19/22 0716)       ICU DAILY CHECKLIST           Can patient transfer out of MICU? no  FAST HUG:  Feeding:  Feeding: Yes  Duarte:{Yes  Analgesia/Sedation:Yes  Thromboembolic prophylaxis:SCDs  HOB>30:  Yes  Stress Ulcer Protocol Active: Yes; Mode:  "PPI/H2 Antagonist  Glycemic Control: No components found for: GLU Any glucose > 180 yes; Mode of Insulin Therapy: Sliding Scale Insulin  INTUBATED:  Can patient have daily waking: no  Can patient have spontaneous breathing trial: no;  Does pt have restraints: MD RESTRAINT FOR NON-VIOLENT BEHAVIOR FACE TO FACE EVALUATION Patient's Immediate Situation: Patient demonstrated the following behaviors: Impulsive behavior, grabbing at support tubes/lines.  Patient's Reaction to the intervention Does patient understand the reason for restraint/seclusion? Unable to Express Medical Condition Is there any evidence of compromise of Skin integrity, Respiratory, Cardiovascular, Musculoskeletal, Hydration? No Behavioral ConditionIn consultation with the RN, is there a need to continue this restraint or seclusion? Yes See Restraint Flowsheet for complete restraint documentation and assessment.  PHYSICAL THERAPY AND MOBILITY: Can patient have PT and mobility trial: no Activity: ICU mobility protocol    Critical Care Time excluding procedures and family discussions greater than: 1 Hour    Risk Factors Present on Admission:  Clinically Significant Risk Factors Present on Admission                         Belinda Sarkar DO  Pulmonary and Critical Care Attending  pgr [phone]    Allergies   Allergen Reactions     Penicillins Hives and Rash       Meds: See MAR    Physical Exam:  /62   Pulse 63   Temp 98.1  F (36.7  C) (Oral)   Resp 16   Ht 1.854 m (6' 1\")   Wt 107.8 kg (237 lb 10.5 oz)   SpO2 98%   BMI 31.35 kg/m    Intake/Output this shift:  I/O this shift:  In: 468.5 [I.V.:268.5; NG/GT:200]  Out: 475 [Urine:475]  GEN: Intubated and sedated, NAD  HEENT: et tube in place, OG tube in place  CVS: regular rhythm, no murmurs  RESP: CTA BL, mechanical breath sounds  ABD: Soft, No abdominal pain with palpation, no guarding, no rigidity  EXT: Warm, well perfused, no edema  NEURO:  sedated  PSYCH: sedated    Pertinent Labs: " Latest lab results in EHR personally reviewed.   CMP  Recent Labs   Lab 01/19/22  0811 01/19/22  0528 01/19/22  0354 01/19/22  0118 01/18/22  2337 01/18/22  1955 01/18/22  1536 01/18/22  1535 01/18/22  0900 01/18/22  0420 01/17/22  0823 01/17/22  0409 01/16/22  0837 01/16/22  0551 01/15/22  1511 01/15/22  1503 01/15/22  0505 01/15/22  0405 01/14/22  2112 01/14/22  1349   NA  --   --   --   --   --   --   --   --   --  146*  --  148*  --  145  --  144  --  140   < > 140   POTASSIUM  --  4.0  --  4.3  --   --   --  2.9* 3.2* 3.1*   < > 3.2*   < > 3.6  3.6   < > 3.8   < > 3.2*   < > 3.2*   CHLORIDE  --   --   --   --   --   --   --   --   --  116*  --  118*  --  118*  --  117*  --  116*   < > 108   CO2  --   --   --   --   --   --   --   --   --  22  --  20*  --  19*  --  20*  --  15*   < > 15*   ANIONGAP  --   --   --   --   --   --   --   --   --  8  --  10  --  8  --  7  --  9   < > 17*   *  --  197*  --  168* 139*   < >  --  131* 147*   < > 120   < > 113   < > 103   < > 172*   < > 300*   BUN  --   --   --   --   --   --   --   --   --  31*  --  26*  --  23*  --  26*  --  25*   < > 20   CR  --   --   --   --   --   --   --   --   --  1.10  --  1.02  --  0.96  --  1.03  --  1.40*   < > 1.43*   GFRESTIMATED  --   --   --   --   --   --   --   --   --  79  --  86  --  >90  --  85  --  59*   < > 58*   NATACHA  --   --   --   --   --   --   --   --   --  8.0*  --  7.8*  --  7.5*  --  7.4*  --  7.5*   < > 8.9   MAG  --  2.2  --   --   --   --   --   --   --  2.1  --  2.3  --  1.5*  --   --    < >  --   --  2.7*   PHOS  --  4.4  --   --   --   --   --   --   --  1.6*  --   --   --   --   --   --   --   --   --   --    PROTTOTAL  --   --   --   --   --   --   --   --   --   --   --  5.0*  --  4.7*  --   --   --  5.4*  --  7.3   ALBUMIN  --   --   --   --   --   --   --   --   --   --   --  2.4*  --  2.6*  --   --   --  2.9*  --  3.6   BILITOTAL  --   --   --   --   --   --   --   --   --   --   --  0.7  --  0.6  --   --    --  0.4  --  0.6   ALKPHOS  --   --   --   --   --   --   --   --   --   --   --  74  --  75  --   --   --  100  --  148   AST  --  37  --   --   --   --   --   --   --   --   --  131*  --  215*  --   --   --  578*  --  175*   ALT  --  56*  --   --   --   --   --   --   --   --   --  123*  --  166*  --   --   --  280*  --  242*    < > = values in this interval not displayed.     CBC  Recent Labs   Lab 01/19/22  0528 01/18/22  1846 01/18/22  0420 01/17/22  0408   WBC 15.2* 18.8* 19.5* 16.2*   RBC 3.16* 3.50* 3.33* 3.29*   HGB 9.8* 10.6* 10.1* 10.2*   HCT 29.4* 32.1* 30.8* 30.2*   MCV 93 92 93 92   MCH 31.0 30.3 30.3 31.0   MCHC 33.3 33.0 32.8 33.8   RDW 15.0 14.9 14.7 14.5   * 112* 98* 87*     INR  Recent Labs   Lab 01/16/22  0854 01/16/22  0551 01/16/22  0004 01/15/22  0958   INR 1.26* 1.24* 1.29* 1.14     Arterial Blood Gas  Recent Labs   Lab 01/16/22  0551 01/15/22  1510 01/15/22  0959 01/15/22  0819 01/14/22  2114 01/14/22  1350 01/14/22  1349   PH 7.40 7.38 7.27* 7.34* 7.35*   < >  --    PCO2 34* 34* 41  --  35   < >  --    PO2 113* 158* 142*  --  204*  --   --    HCO3 22* 21* 18*  --  20*   < >  --    O2PER 40 40 40  --   --   --  100    < > = values in this interval not displayed.       Cultures: personally reviewed.   7-Day Micro Results    Collected Updated Procedure Result Status    01/14/2022 2240 01/18/2022 1003 Blood Culture Peripheral Blood [[id number]]   Peripheral Blood    Preliminary result Component Value   Culture No growth after 3 days P              01/14/2022 2119 01/18/2022 0824 Respiratory Aerobic Bacterial Culture [[id number]]   (Abnormal)   Washings from Trachea    Preliminary result Component Value   Culture 4+ Normal talib P    3+ Streptococcus agalactiae (Group B Streptococcus) Abnormal  P    This organism is susceptible to ampicillin, penicillin, vancomycin and the cephalosporins. If treatment is required and your patient is allergic to penicillin, contact the microbiology  lab within 5 days to request susceptibility testing.    3+ Staphylococcus aureus Abnormal  P              01/14/2022 1415 01/14/2022 1547 Asymptomatic COVID-19 Virus (Coronavirus) by PCR Nasopharyngeal [[id number]]    Swab from Nasopharyngeal    Final result Component Value   SARS CoV2 PCR Negative   NEGATIVE: SARS-CoV-2 (COVID-19) RNA not detected, presumed negative.          Imaging: personally reviewed.   Results for orders placed or performed during the hospital encounter of 01/14/22   XR Chest Port 1 View    Narrative    EXAM: XR CHEST PORT 1 VIEW  LOCATION: [facility]  DATE/TIME: 1/14/2022 7:25 PM    INDICATION: Endotracheal tube positioning.  COMPARISON: 01/14/2022.      Impression    IMPRESSION:     ET tube tip roughly 7 cm above the david, near the level of the clavicular heads. Enteric tube tip at the distal esophagus; recommend at least 10 cm of advancement.     Hypoexpanded lungs. No significant change of bilateral opacities. No pleural effusion or pneumothorax. Stable cardiomediastinal silhouette.       CT Head w/o Contrast    Narrative    EXAM: CT HEAD W/O CONTRAST  LOCATION: [facility]  DATE/TIME: 1/14/2022 8:07 PM    INDICATION: Cardiac arrest.  COMPARISON: None.  TECHNIQUE: Routine CT Head without IV contrast. Multiplanar reformats. Dose reduction techniques were used.    FINDINGS:  INTRACRANIAL CONTENTS: No evidence of acute intracranial hemorrhage or mass effect. Brain attenuation morphology are normal. The ventricles and sulci are normal for age. Normal gray-white matter differentiation. The basilar cisterns are patent.    VISUALIZED ORBITS/SINUSES/MASTOIDS: The globes are unremarkable. The partially imaged paranasal sinuses, mastoid air cells and middle ear cavities are unremarkable.     BONES/SOFT TISSUES: The visualized skull base and calvarium are unremarkable.      Impression    IMPRESSION:    1.  No evidence of acute intracranial  hemorrhage or mass effect.   XR Chest Port 1 View    Narrative    EXAM: XR CHEST PORT 1 VIEW  LOCATION: [facility]  DATE/TIME: 1/17/2022 11:38 AM    INDICATION: increasing O2 needs  COMPARISON: 01/14/2022      Impression    IMPRESSION: Endotracheal tube tip 5.6 cm from david. Nasogastric tube tip projects over the upper stomach. Increasing bilateral pulmonary infiltrates in a bat wing appearance could represent pulmonary edema, pneumonia, hemorrhage, or bilateral pneumonia   such as influenza or Covid.    No effusion. Stable normal heart size.   XR Abdomen Port 1 View    Narrative    EXAM: XR ABDOMEN PORT 1 VIEWS  LOCATION: [facility]  DATE/TIME: 1/17/2022 11:40 AM    INDICATION: OG tube placement verification  COMPARISON: None.      Impression    IMPRESSION: Nasogastric tube tip in proximal port project over the upper stomach. Nonobstructive gas pattern.    XR Chest Port 1 View    Narrative    EXAM: XR CHEST PORT 1 VIEW  LOCATION: [facility]  DATE/TIME: 1/18/2022 6:17 AM    INDICATION: Location of ET tube  COMPARISON: 01/17/2022.      Impression    IMPRESSION: Endotracheal tube tip is 6.5 cm above the david. PICC catheter from right upper extremity approach is in the mid superior vena cava. Enteric tube tip is in the stomach. Again noted are bilateral perihilar airspace opacities consistent with   edema or bilateral pneumonia there has been slight increased consolidation at the left base behind the heart. No pneumothorax or pleural effusion.   Echocardiogram Complete     Value    LVEF  30-35% (moderately reduced)    Narrative    [id number]  [id number]  [id number]    ______________________________________________________________________________  Procedure  Definity ([facility]C #57507-023) given intravenously. No hemodynamically significant  valvular abnormalities on 2D or color flow imaging. There is no comparison  study  available.  ______________________________________________________________________________  Interpretation Summary     1.The left ventricle is mildly dilated.  2.Left ventricular function is decreased. The ejection fraction is 30-35%  (moderately reduced).  3.There is anterior wall akinesis.  4.Normal right ventricle size and systolic function.  4.The left atrium is moderately dilated.  5.No hemodynamically significant valvular abnormalities on 2D or color flow  imaging.  6.IVC diameter and respiratory changes fall into an intermediate range  suggesting an RA pressure of 8 mmHg.  7.There is no comparison study available.  ______________________________________________________________________________  I   Left Ventricle  The left ventricle is mildly dilated. Left ventricular function is decreased.  The ejection fraction is 30-35% (moderately reduced). There is borderline  concentric left ventricular hypertrophy. Left ventricular diastolic function  is normal. There is anterior wall akinesis.     Right Ventricle  Normal right ventricle size and systolic function.     Atria  The left atrium is moderately dilated. Right atrial size is normal. There is  no color Doppler evidence of an atrial shunt.     Mitral Valve  Mitral valve leaflets appear normal. There is no evidence of mitral stenosis  or clinically significant mitral regurgitation. There is trace mitral  regurgitation. There is no mitral valve stenosis.     Tricuspid Valve  The tricuspid valve is not well visualized, but is grossly normal. Right  ventricle systolic pressure estimate normal. There is physiologic tricuspid  regurgitation. There is no tricuspid stenosis.     Aortic Valve  Aortic valve leaflets appear normal. There is no evidence of aortic stenosis  or clinically significant aortic regurgitation. The aortic valve is not well  visualized. No aortic regurgitation is present. No aortic stenosis is present.     Pulmonic Valve  The pulmonic valve is not  well seen, but is grossly normal. The pulmonic valve  is not well visualized. This degree of valvular regurgitation is within normal  limits. There is no pulmonic valvular stenosis.     Vessels  The aorta root is normal. Normal size ascending aorta. IVC diameter and  respiratory changes fall into an intermediate range suggesting an RA pressure  of 8 mmHg.     Pericardium  There is no pericardial effusion.     Rhythm  Sinus rhythm was noted.     ______________________________________________________________________________  _________________________________________________  Report approved by: Doyle Hilario 01/15/2022 01:43 PM             Patient Active Problem List   Diagnosis     Cardiac arrest (H)     Essential hypertension     Hypercholesterolemia     Regular astigmatism of both eyes       Belinda Sarkar,   Pulmonary and Critical Care Attending  pgr [phone]

## 2022-01-19 NOTE — PLAN OF CARE
Problem: Adult Inpatient Plan of Care  Goal: Plan of Care Review  Outcome: No Change     Problem: Adult Inpatient Plan of Care  Goal: Absence of Hospital-Acquired Illness or Injury  Intervention: Prevent Skin Injury  Recent Flowsheet Documentation  Taken 1/19/2022 1600 by Rosanne Wong RN  Body Position:   right   side-lying 30 degrees  Taken 1/19/2022 1400 by Rosanne Wong RN  Body Position:   side-lying 30 degrees   left   weight shifting  Taken 1/19/2022 1200 by Rosanne Wong RN  Body Position:   weight shifting   supine, head elevated  Taken 1/19/2022 1000 by Rosanne Wong RN  Body Position:   right   side-lying 30 degrees  Taken 1/19/2022 0800 by Rosanne Wong RN  Body Position:   weight shifting   supine, head elevated  Intervention: Prevent Infection  Recent Flowsheet Documentation  Taken 1/19/2022 1200 by Rosanne Wogn RN  Infection Prevention: hand hygiene promoted  Taken 1/19/2022 0800 by Rosanne Wong RN  Infection Prevention: hand hygiene promoted     Problem: Dysrhythmia (Targeted Temperature Management)  Goal: Stable Cardiac Rate and Rhythm  Intervention: Monitor and Manage Cardiac Rhythm Effect  Recent Flowsheet Documentation  Taken 1/19/2022 1200 by Rosanne Wong RN  Dysrhythmia Management: (amio off at 6 am this morning) other (see comments)  Taken 1/19/2022 0800 by Rosanne Wong RN  Dysrhythmia Management: (amio off at 6 am this morning) other (see comments)

## 2022-01-19 NOTE — PHARMACY-VANCOMYCIN DOSING SERVICE
Pharmacy Vancomycin Note  Date of Service 2022  Patient's  1965   56 year old, male    Indication: Aspiration Pneumonia  Day of Therapy: 3   Current vancomycin regimen:  1000 mg IV q12h  Current vancomycin monitoring method: AUC  Current vancomycin therapeutic monitoring goal: 400-600 mg*h/L    Insight[employer] Prediction of Current Vancomycin Regimen  Regimen: 1000 mg IV every 12 hours.  Start time: 14:38 on 2022  Exposure target: AUC24 (range)400-600 mg/L.hr   AUC24,ss: 616 mg/L.hr  Probability of AUC24 > 400: 99 %  Ctrough,ss: 21.1 mg/L  Probability of Ctrough,ss > 20: 59 %  Probability of nephrotoxicity (Lodise KEKE ): 19 %    Current estimated CrCl = Estimated Creatinine Clearance: 76.5 mL/min (A) (based on SCr of 1.39 mg/dL (H)).    Creatinine for last 3 days  2022:  4:09 AM Creatinine 1.02 mg/dL  2022:  4:20 AM Creatinine 1.10 mg/dL  2022: 11:29 AM Creatinine 1.39 mg/dL    Recent Vancomycin Levels (past 3 days)  2022: 11:29 AM Vancomycin 17.0 mg/L    Vancomycin IV Administrations (past 72 hours)                   vancomycin (VANCOCIN) 1000 mg in dextrose 5% 200 mL PREMIX (mg) 1,000 mg New Bag 22 1348     1,000 mg New Bag  0127     1,000 mg New Bag 22 1502     1,000 mg New Bag  0120    vancomycin (VANCOCIN) 1,750 mg in sodium chloride 0.9 % 500 mL intermittent infusion (mg) 1,750 mg Given 22 1427                Nephrotoxins and other renal medications (From now, onward)    Start     Dose/Rate Route Frequency Ordered Stop    22 1000  furosemide (LASIX) 100 mg in sodium chloride 0.9 % 100 mL infusion         15 mg/hr  15 mL/hr  Intravenous CONTINUOUS 22 0912      22 0200  vancomycin (VANCOCIN) 1000 mg in dextrose 5% 200 mL PREMIX         1,000 mg  200 mL/hr over 1 Hours Intravenous EVERY 12 HOURS 22 1213      01/15/22 0140  piperacillin-tazobactam (ZOSYN) 3.375 g vial to attach to  mL bag        Note to Pharmacy: Extended  "infusion dosing to start 6 hours after initial infusion.   \"Followed by\" Linked Group Details    3.375 g  over 240 Minutes Intravenous EVERY 8 HOURS 01/14/22 1940      01/14/22 1930  norepinephrine (LEVOPHED) 4 mg in  mL infusion PREMIX         0.01-0.6 mcg/kg/min × 70 kg  2.6-157.5 mL/hr  Intravenous CONTINUOUS 01/14/22 1919               Contrast Orders - past 72 hours (72h ago, onward)            Start     Dose/Rate Route Frequency Ordered Stop    10/30/22 1030  perflutren lipid microsphere (DEFINITY) injection SUSP 3 mL         3 mL Intravenous ONCE 01/15/22 1050 01/15/22 1030          Interpretation of levels and current regimen:  Vancomycin level is reflective of AUC greater than 600    Has serum creatinine changed greater than 50% in last 72 hours: Yes    Urine output:  diminished urine output    Renal Function: Worsening    InsightRX Prediction of Planned New Vancomycin Regimen  Regimen: 1500 mg IV every 24 hours.  Start time: 14:38 on 01/19/2022  Exposure target: AUC24 (range)400-600 mg/L.hr   AUC24,ss: 477 mg/L.hr  Probability of AUC24 > 400: 84 %  Ctrough,ss: 13.9 mg/L  Probability of Ctrough,ss > 20: 9 %  Probability of nephrotoxicity (Lodise KEKE 2009): 9 %      Plan:  1. Decrease Dose to 1500mg IV q24h  2. Vancomycin monitoring method: AUC  3. Vancomycin therapeutic monitoring goal: 400-600 mg*h/L  4. Pharmacy will check vancomycin levels as appropriate   5. Serum creatinine levels will be ordered daily BMP in ICU.    Chel Latif [facility]    "

## 2022-01-19 NOTE — PROGRESS NOTES
Bedside EEG was performed that included photic, auditory, and sensory stimulation. Hyperventilation was not possible given the patient's clinical state.   EEG # .  EEG [id number] system used.

## 2022-01-19 NOTE — PROGRESS NOTES
HEART CARE NOTE          Assessment/Recommendations     1. VT/VF cardiac arrest and cardiogenic shock  Assessment / Plan    Witnessed arrest and bystander CPR with ~ 15 mins down time; S/p PCI to proximal LAD    No recurrence of non-sustained VT; will hold amiodarone given VT/VF occurred in the setting of acute ischemia now s/p revascularization      2. HFrEF/ICM c/b VT/VF arrest  Assessment / Plan    Improvement in diuresis on current regimen --> no changes at this time; continue to monitor UOP and hemodynamics closely    GDMT on hold given the above     3. CAD/ICM c/b STEMI  Assessment / Plan    Acute anterior MI; S/p PCI to LAD    Cangrelor gtt transitioned to ticagreloe    Neurology following --> assessment of neurologic recovery in process     Patient remains critically ill in the ICU s/p cardiac arrest and PCI in the setting of VT/VF; Completed hypothermic protocol and currently on mechanical ventilation while awaiting neurologic recovery. 45 minutes spent on critical care.    History of Present Illness/Subjective      Mr. Jonathan De La Rosa is a 56 year old male with a PMHx significant for HTN and HLD admitted in cardiogenic shock after VT/VF arrest in the setting of acute STEMI.     Today, patient is intubated and sedated     ECG: Personally reviewed. normal sinus rhythm, fusion complexes; acute MI.     ECHO (personnaly Reviewed):   1.The left ventricle is mildly dilated.  2.Left ventricular function is decreased. The ejection fraction is 30-35%  (moderately reduced).  3.There is anterior wall akinesis.  4.Normal right ventricle size and systolic function.  4.The left atrium is moderately dilated.  5.No hemodynamically significant valvular abnormalities on 2D or color flow  imaging.  6.IVC diameter and respiratory changes fall into an intermediate range  suggesting an RA pressure of 8 mmHg.  7.There is no comparison study available.          Physical Examination Review of Systems   /62   Pulse 67    "Temp 98.2  F (36.8  C) (Oral)   Resp 16   Ht 1.854 m (6' 1\")   Wt 107.8 kg (237 lb 10.5 oz)   SpO2 96%   BMI 31.35 kg/m    Body mass index is 31.35 kg/m .  Wt Readings from Last 3 Encounters:   01/18/22 107.8 kg (237 lb 10.5 oz)   01/14/22 70 kg (154 lb 5.2 oz)     General Appearance:   Intubated/sedated   ENT/Mouth: membranes moist, no oral lesions or bleeding gums.      EYES:  no scleral icterus, normal conjunctivae   Neck: no carotid bruits or thyromegaly   Chest/Lungs:   lungs are clear to auscultation, no rales or wheezing, equal chest wall expansion    Cardiovascular:   Regular. Normal first and second heart sounds with no murmurs, rubs, or gallops; the carotid, radial and posterior tibial pulses are intact, + JVD and LE edema bilaterally    Abdomen:  no organomegaly, masses, bruits, or tenderness; bowel sounds are present   Extremities: no cyanosis or clubbing   Skin: no xanthelasma, warm.    Neurologic: intubated/sedated     Psychiatric: Intubated/sedated     A complete 10 systems ROS was reviewed  And is negative except what is listed in the HPI.          Medical History  Surgical History Family History Social History   No past medical history on file. Past Surgical History:   Procedure Laterality Date     CV HEART CATHETERIZATION WITH POSSIBLE INTERVENTION N/A 1/14/2022    Procedure: coronary angiogram;  Surgeon: Leo Gallegos MD;  Location:  HEART CARDIAC CATH LAB     CV INTRAVASULAR ULTRASOUND N/A 1/14/2022    Procedure: Intravascular Ultrasound;  Surgeon: Leo Gallegos MD;  Location:  HEART CARDIAC CATH LAB     CV LEFT HEART CATH N/A 1/14/2022    Procedure: Left Heart Cath;  Surgeon: Leo Gallegos MD;  Location:  HEART CARDIAC CATH LAB     CV PCI STENT DRUG ELUTING N/A 1/14/2022    Procedure: Percutaneous Coronary Intervention Stent Drug Eluting;  Surgeon: Leo Gallegos MD;  Location:  HEART CARDIAC CATH LAB     CV THERAPEUTIC HYPOTHERMIA N/A 1/14/2022    Procedure: " Therapeutic Hypothermia;  Surgeon: Leo Gallegos MD;  Location:  HEART CARDIAC CATH LAB     CV THROMBECTOMY CORONARY N/A 1/14/2022    Procedure: Thrombectomy Coronary;  Surgeon: Leo Gallegos MD;  Location: [facility]    no family history of premature coronary artery disease Social History     Socioeconomic History     Marital status:      Spouse name: Not on file     Number of children: Not on file     Years of education: Not on file     Highest education level: Not on file   Occupational History     Not on file   Tobacco Use     Smoking status: Not on file     Smokeless tobacco: Not on file   Substance and Sexual Activity     Alcohol use: Not on file     Drug use: Not on file     Sexual activity: Not on file   Other Topics Concern     Not on file   Social History Narrative     Not on file     Social Determinants of Health     Financial Resource Strain: Not on file   Food Insecurity: Not on file   Transportation Needs: Not on file   Physical Activity: Not on file   Stress: Not on file   Social Connections: Not on file   Intimate Partner Violence: Not on file   Housing Stability: Not on file           Lab Results    Chemistry/lipid CBC Cardiac Enzymes/BNP/TSH/INR   Lab Results   Component Value Date    TRIG 228 (H) 01/18/2022    BUN 31 (H) 01/18/2022     (H) 01/18/2022    CO2 22 01/18/2022    Lab Results   Component Value Date    WBC 15.2 (H) 01/19/2022    HGB 9.8 (L) 01/19/2022    HCT 29.4 (L) 01/19/2022    MCV 93 01/19/2022     (L) 01/19/2022    Lab Results   Component Value Date    INR 1.26 (H) 01/16/2022     No results found for: CKTOTAL, CKMB, TROPONINI       Weight:    Wt Readings from Last 3 Encounters:   01/18/22 107.8 kg (237 lb 10.5 oz)   01/14/22 70 kg (154 lb 5.2 oz)       Allergies  Allergies   Allergen Reactions     Penicillins Hives and Rash         Surgical History  Past Surgical History:   Procedure Laterality Date     CV HEART CATHETERIZATION WITH  POSSIBLE INTERVENTION N/A 1/14/2022    Procedure: coronary angiogram;  Surgeon: Leo Gallegos MD;  Location:  HEART CARDIAC CATH LAB     CV INTRAVASULAR ULTRASOUND N/A 1/14/2022    Procedure: Intravascular Ultrasound;  Surgeon: Leo Gallegos MD;  Location:  HEART CARDIAC CATH LAB     CV LEFT HEART CATH N/A 1/14/2022    Procedure: Left Heart Cath;  Surgeon: Leo Gallegos MD;  Location:  HEART CARDIAC CATH LAB     CV PCI STENT DRUG ELUTING N/A 1/14/2022    Procedure: Percutaneous Coronary Intervention Stent Drug Eluting;  Surgeon: Leo Gallegos MD;  Location: [facility]     CV THERAPEUTIC HYPOTHERMIA N/A 1/14/2022    Procedure: Therapeutic Hypothermia;  Surgeon: Leo Gallegos MD;  Location:  HEART CARDIAC CATH LAB     CV THROMBECTOMY CORONARY N/A 1/14/2022    Procedure: Thrombectomy Coronary;  Surgeon: Leo Gallegos MD;  Location:  HEART CARDIAC CATH LAB       Social History  Tobacco:   History   Smoking Status     Not on file   Smokeless Tobacco     Not on file    Alcohol:   Social History    Substance and Sexual Activity      Alcohol use: Not on file   Illicit Drugs:   History   Drug Use Not on file       Family History  No family history on file.       Patricia Darling MD on 1/19/2022      cc: Praveen Loaiza

## 2022-01-19 NOTE — PROCEDURES
FIBEROPTIC BRONCHOSCOPY PROCEDURE NOTE (NON-OR)  Date of Procedure: 1/18/2022  Performing Physician: Mikey Snowden MD  Pre-Procedure Diagnosis: acute hypoxia  Post-Procedure Diagnosis: same as pre-procedure diagnosis  Procedure: diagnostic flexible fiberoptic bronchoscopy   Indications: acute hypoxia  Preop evaluation:  Procedural Sedation: intravenous sedation   Expected level: moderate sedation  ASA Class: ASA 4 - Patient with severe systemic disease that is a constant threat to life  Mallampati: NA  Respiratory Precautions: The patient was evaluated for TB risk prior to the procedure. The risk was judged to be low, but special respiratory isolation was observed due to the pandemic  Anesthesia:  Fentanyl 100 mcg IV, propofol continuous, fentanyl continuous  Specimen: none  Estimated Blood Loss: minimal  Complications: none immediate     Procedure Details and Findings:   The procedure was performed as an emergency, so consent was not obtained. The patient was identified as Jonathan De La Rosa with date of birth 1965 and the procedure verified as diagnostic flexible fiberoptic bronchoscopy. A time out was held and the above information confirmed.    After application of anesthesia, the patient was placed in the supine position and the bronchoscope was passed through the endotracheal tube. The distal end of the endotracheal tube was noted to be within the tracheal lumen, but in the proximal trachea, and was advanced to approximately 2 cm proximal to the david. Minimal clear secretions were emerging from the distal airways bilaterally, without focal mucous plugs. The procedure then concluded. BAL was not performed due the patient's severe hypoxia with SpO2 86-88% on FiO2 100%.    Treatment Plan Based on Findings:  - see other documentation for diagnosis of likely pulmonary embolism  - ongoing diuresis  - antibiotics  - heparin drip for likely pulmonary embolism    Mikey Snowden MD  Pulmonary and Critical  [facility]  Office [phone]

## 2022-01-19 NOTE — PROGRESS NOTES
Care Management Follow Up    Length of Stay (days): 5    Expected Discharge Date: 01/26/2022     Concerns to be Addressed:     Requires ICU level of care due to intubation, ventilator support and IV medications. Neurology following-Repeat EEG. IV Lasix drip. Clots-IV heparin drip   Patient plan of care discussed at interdisciplinary rounds: Yes    Anticipated Discharge Disposition:  To be determined, Home if able     Anticipated Discharge Services:  To be determined pending progression and recommendations.   Anticipated Discharge DME:  To be determined closer to time of discharge.     Patient/family educated on Medicare website which has current facility and service quality ratings:  Not needed at this time   Education Provided on the Discharge Plan:  Per team  Patient/Family in Agreement with the Plan:  Yes    Referrals Placed by CM/SW:  None at this time  Private pay costs discussed: Not applicable    Additional Information:  Per discussion in rounds Neurology following, patient has clots-getting heparin. From home with spouse and is independent at baseline. Works part time as a . No insurance previous care manager made referral to Financial . Main family contacts are daughter Jennifer and spouse. Discharge plan pending progression and recommendations. Care manager to follow.       Julia Sierra RN

## 2022-01-20 NOTE — PROGRESS NOTES
Pt taken for head CT at around 1945 without incident. Pt has no response to being moved, no movement or facial expression. Will wean sedation.

## 2022-01-20 NOTE — PLAN OF CARE
Problem: Communication Impairment (Mechanical Ventilation, Invasive)  Goal: Effective Communication  Outcome: No Change     Problem: Device-Related Complication Risk (Mechanical Ventilation, Invasive)  Goal: Optimal Device Function  Outcome: No Change     Problem: Inability to Wean (Mechanical Ventilation, Invasive)  Goal: Mechanical Ventilation Liberation  Outcome: No Change     Problem: Ventilator-Induced Lung Injury (Mechanical Ventilation, Invasive)  Goal: Absence of Ventilator-Induced Lung Injury  Outcome: No Change     Hermelindo Conde RT on 1/20/2022 at 5:25 AM

## 2022-01-20 NOTE — PHARMACY-VANCOMYCIN DOSING SERVICE
Pharmacy Vancomycin Note  Date of Service 2022  Patient's  1965   56 year old, male    Indication: Aspiration Pneumonia  Day of Therapy: 4   Current vancomycin regimen:  1500 mg IV q24h  Current vancomycin monitoring method: AUC  Current vancomycin therapeutic monitoring goal: 400-600 mg*h/L    [employer] Prediction of Current Vancomycin Regimen  Regimen: 1500 mg IV every 24 hours.  Start time: 07:48 on 2022  Exposure target: AUC24 (range)400-600 mg/L.hr   AUC24,ss: 451 mg/L.hr  Probability of AUC24 > 400: 80 %  Ctrough,ss: 12.7 mg/L  Probability of Ctrough,ss > 20: 3 %  Probability of nephrotoxicity (Lodise KEKE ): 8 %      Current estimated CrCl = Estimated Creatinine Clearance: 81.4 mL/min (based on SCr of 1.3 mg/dL).    Creatinine for last 3 days  2022:  4:20 AM Creatinine 1.10 mg/dL  2022: 11:29 AM Creatinine 1.39 mg/dL  2022:  4:21 AM Creatinine 1.30 mg/dL    Recent Vancomycin Levels (past 3 days)  2022: 11:29 AM Vancomycin 17.0 mg/L  2022:  4:21 AM Vancomycin 16.2 mg/L    Vancomycin IV Administrations (past 72 hours)                   vancomycin (VANCOCIN) 1000 mg in dextrose 5% 200 mL PREMIX (mg) 1,000 mg New Bag 22 1348     1,000 mg New Bag  0127     1,000 mg New Bag 22 1502     1,000 mg New Bag  0120    vancomycin (VANCOCIN) 1,750 mg in sodium chloride 0.9 % 500 mL intermittent infusion (mg) 1,750 mg Given 22 1427                Nephrotoxins and other renal medications (From now, onward)    Start     Dose/Rate Route Frequency Ordered Stop    22 1400  vancomycin (VANCOCIN) 1,500 mg in sodium chloride 0.9 % 250 mL intermittent infusion         1,500 mg  over 90 Minutes Intravenous EVERY 24 HOURS 22 1451      22 1000  furosemide (LASIX) 100 mg in sodium chloride 0.9 % 100 mL infusion         15 mg/hr  15 mL/hr  Intravenous CONTINUOUS 22 0912      01/15/22 0140  piperacillin-tazobactam (ZOSYN) 3.375 g vial to attach  "to  mL bag        Note to Pharmacy: Extended infusion dosing to start 6 hours after initial infusion.   \"Followed by\" Linked Group Details    3.375 g  over 240 Minutes Intravenous EVERY 8 HOURS 01/14/22 1940      01/14/22 1930  norepinephrine (LEVOPHED) 4 mg in  mL infusion PREMIX         0.01-0.6 mcg/kg/min × 70 kg  2.6-157.5 mL/hr  Intravenous CONTINUOUS 01/14/22 1919               Contrast Orders - past 72 hours (72h ago, onward)            Start     Dose/Rate Route Frequency Ordered Stop    10/30/22 1030  perflutren lipid microsphere (DEFINITY) injection SUSP 3 mL         3 mL Intravenous ONCE 01/15/22 1050 01/15/22 1030          Interpretation of levels and current regimen:  Vancomycin level is reflective of -600    Has serum creatinine changed greater than 50% in last 72 hours: Yes    Urine output:  good urine output    Renal Function: Stable      Plan:  1. Continue Current Dose  2. Vancomycin monitoring method: AUC  3. Vancomycin therapeutic monitoring goal: 400-600 mg*h/L  4. Pharmacy will check vancomycin levels as appropriate   5. Serum creatinine levels will be ordered daily BMP in ICU.    Chel Latif RPH    "

## 2022-01-20 NOTE — PLAN OF CARE
Problem: Communication Impairment (Mechanical Ventilation, Invasive)  Goal: Effective Communication  Outcome: No Change     Problem: Device-Related Complication Risk (Mechanical Ventilation, Invasive)  Goal: Optimal Device Function  Outcome: No Change     Problem: Inability to Wean (Mechanical Ventilation, Invasive)  Goal: Mechanical Ventilation Liberation  Outcome: No Change     Problem: Skin and Tissue Injury (Mechanical Ventilation, Invasive)  Goal: Absence of Device-Related Skin and Tissue Injury  Outcome: No Change     Problem: Ventilator-Induced Lung Injury (Mechanical Ventilation, Invasive)  Goal: Absence of Ventilator-Induced Lung Injury  Outcome: No Change     Renee Whiting, RT

## 2022-01-20 NOTE — PROGRESS NOTES
RT PROGRESS NOTE    VENT DAY# 6    CURRENT SETTINGS:   Ventilation Mode: CMV/AC  (Continuous Mandatory Ventilation/ Assist Control)  FiO2 (%): (S) 65 %  Rate Set (breaths/minute): 16 breaths/min  Tidal Volume Set (mL): 500 mL  PEEP (cm H2O): 14 cmH2O  Oxygen Concentration (%): (S) 60 %  Resp: 16      PATIENT PARAMETERS:  PIP 28  Pplat:  26  Pmean:  17  SBT: No  Secretions: small, blood tinged  02 Sats:  95%    ETT SIZE 7.5 Secured at 25 cm at teeth/gums    Respiratory Medications: Alb BID     NOTE / SHIFT SUMMARY:       Able to wean FiO2 down from 80% to 60%.  No other vent changes made.  No SBT secondary to ventilator needs.  Plan is full ventilator support, titrate O2 as able.    Marylou Tan, RT

## 2022-01-20 NOTE — PROGRESS NOTES
RESPIRATORY CARE    VENT DAY #:  5; intubated on 1/14/22    ETT SIZE + PLACEMENT: 7.5 secured 25 cm at the teeth    CURRENT VENT SETTINGS:  Mode: AC  Rate: 16  Tidal volume: 500  PEEP: +14  FiO2: 50%    PATIENT PARAMETERS:  PIP: 30  Pplateau: 29  Pmean: 16  RR: 16  SpO2: 93%    BREATH SOUNDS: Coarse    SECRETIONS: Copious tan/bloody secretions    RESPIRATORY MEDS: Albuterol BID    SBT: No; PEEP>8    ABG:    Ref. Range 1/20/2022 08:45   pH Arterial Latest Ref Range: 7.37 - 7.44  7.41   pCO2 Arterial Latest Ref Range: 35 - 45 mm Hg 53 (H)   PO2 Arterial Latest Ref Range: 80 - 90 mm Hg 80   Bicarbonate Arterial Latest Ref Range: 23 - 29 mmol/L 31 (H)     NOTE/PLAN: Pt remains on full mechanical ventilator support. Sputum sent this morning. Double stacking breaths noted. Pt was transported to [facility] today. Ambu bag ventilation was initiated while on the way back from MRI due to increased secretions and desaturation (mid 80s). Saturation in the 90s with bagging. Continue to monitor.      Renee Whiting, RT

## 2022-01-20 NOTE — PROGRESS NOTES
[facility] [place] [facility] Pulmonary/Critical Care Consult Team Note    Jonathan De La Rosa,  1965, MRN [id number]  Admitting Dx: Cardiac arrest (H) [I46.9]  Date / Time of Admission:  2022  6:29 PM    Overnight Events:  Intake/Output last 3 shifts:  I/O last 3 completed shifts:  In: 3593.87 [I.V.:2053.87; NG/GT:1260]  Out: 6210 [Urine:5810; Stool:400]  On lasix gtt 15  Net -2L YESTERDAY  Titrated off levophed this morning  Titrated down on the vent to 50%    Assessment/Plan: Jonathan De La Rosa is a 56 year old male with PMHx of hypertension and nicotine abuse who was admitted via EMS after suffering an out of hospital [place] cardiac arrest. According to the record he had been experiencing chest pain for approximately 24 hours before he suddenly became unresponsive. His wife was present and initiated CPR and called 911. He had a shockable rhythm on EMS arrival and he was treated with defibrillation 3 times before ROSC. He was then transferred to [facility] where he was found to have profound ST elevation in anterior leads and taken to the cath lab. He was found to have acute thrombotic occlusion of the proximal LAD that was treated with PCI and placement of a JUAN JOSE. He was initiated on cooling protocol and transferred to [facility] ICU    PULM/ID: Acute resp failure due to cardiac arrest intubated  also with Aspiration pneumonia  Ventilation Mode: CMV/AC  (Continuous Mandatory Ventilation/ Assist Control)  FiO2 (%): 60 %  Rate Set (breaths/minute): 16 breaths/min  Tidal Volume Set (mL): 500 mL  PEEP (cm H2O): 14 cmH2O  Oxygen Concentration (%): 50 %  Resp: 16  - Tracheal culture is growing strep agalactiae and staph aureus .   - Scheduled bronchodilators.   - Continue zosyn IV, vancomycin  - s/p bronchoscopy on   - will repeat Septum Cx  - will send Fungitel     CV: S/p VT/VFib cardiac arrest 22 due to STEMI s/p JUAN JOSE   - Hypothermia protocol and Patient was rewarmed on 22 at 1 AM.  - Monitor on tele  -  Follow up echocardiogram EF 30-35%, anterior wall akinesis, moderate dilated LA, normal RV size  - continue ASA, statins, Ticagrelor  - holding bblockers due to hypotension on levophed  - Cardiology following.     GI: NPO  - consulted dietary for tube feed recs  - GI proph    Heme: IVC Clot and bilateral occlusive DVT in peroneal veins  - on high intensity heparin    RENAL: Hypernatremia 152 <-- 149  - lasix gtt at 15  - increasing free water flushes  - baseline Cr of 0.8  - Follow BUN/Creatinine  - strict I/O's    NEURO: cardiac arrest concerning for anoxic brain injury  - Head CT scan was negative. Continue propofol and versed drip.   - Keppra IV. Neurology is following.    - MRI Brain was unable to be done earlier this week, desaturated on portable vent  - repeat Head CT 1/19 no acute findings  - Neuro recs  - will try for MRI today, he is on lower FiO2, will switch him to portable vent and see how he tolerates before full transport     ENDO:-   - Critical Care hyperglycemic protocol  - Accuchecks and sliding scale q4hrs    Pt has critical illness and impairs breathing on vent such as there is high probability of imminent of life threatening deterioration in the patient's condition.    Code Status: FULL CODE    Infusions:    dextrose       DOPamine Stopped (01/16/22 0729)     fentaNYL 75 mcg/hr (01/20/22 0602)     furosemide (LASIX) infusion ADULT STANDARD 15 mg/hr (01/20/22 0601)     heparin 1,800 Units/hr (01/20/22 0601)     midazolam Stopped (01/16/22 2328)     norepinephrine Stopped (01/19/22 2259)     propofol (DIPRIVAN) infusion 45 mcg/kg/min (01/20/22 0601)       ICU DAILY CHECKLIST           Can patient transfer out of MICU? no  FAST HUG:  Feeding:  Feeding: Yes  Duarte:{Yes  Analgesia/Sedation:Yes  Thromboembolic prophylaxis:SCDs  HOB>30:  Yes  Stress Ulcer Protocol Active: Yes; Mode: PPI/H2 Antagonist  Glycemic Control: No components found for: GLU Any glucose > 180 yes; Mode of Insulin Therapy: Sliding  "Scale Insulin  INTUBATED:  Can patient have daily waking: no  Can patient have spontaneous breathing trial: no;  Does pt have restraints: MD RESTRAINT FOR NON-VIOLENT BEHAVIOR FACE TO FACE EVALUATION Patient's Immediate Situation: Patient demonstrated the following behaviors: Impulsive behavior, grabbing at support tubes/lines.  Patient's Reaction to the intervention Does patient understand the reason for restraint/seclusion? Unable to Express Medical Condition Is there any evidence of compromise of Skin integrity, Respiratory, Cardiovascular, Musculoskeletal, Hydration? No Behavioral ConditionIn consultation with the RN, is there a need to continue this restraint or seclusion? Yes See Restraint Flowsheet for complete restraint documentation and assessment.  PHYSICAL THERAPY AND MOBILITY: Can patient have PT and mobility trial: no Activity: ICU mobility protocol    Critical Care Time excluding procedures and family discussions greater than: 1 Hour    Risk Factors Present on Admission:  Clinically Significant Risk Factors Present on Admission                         Belinda Sarkar DO  Pulmonary and Critical Care Attending  pgr [phone]    Allergies   Allergen Reactions     Penicillins Hives and Rash       Meds: See MAR    Physical Exam:  /62   Pulse 79   Temp 98.3  F (36.8  C) (Oral)   Resp 16   Ht 1.854 m (6' 1\")   Wt 107 kg (235 lb 14.3 oz)   SpO2 99%   BMI 31.12 kg/m    Intake/Output this shift:  No intake/output data recorded.  GEN: Intubated and sedated, NAD  HEENT: et tube in place, OG tube in place  CVS: regular rhythm, no murmurs  RESP: CTA BL, mechanical breath sounds  ABD: Soft, No abdominal pain with palpation, no guarding, no rigidity  EXT: Warm, well perfused, 1+ edema  NEURO:  sedated  PSYCH: sedated    Pertinent Labs: Latest lab results in EHR personally reviewed.   CMP  Recent Labs   Lab 01/20/22  0421 01/20/22  0420 01/19/22  2342 01/19/22  2019 01/19/22  1140 01/19/22  1129 " 01/19/22  0811 01/19/22  0528 01/19/22  0354 01/19/22  0118 01/18/22  0900 01/18/22  0420 01/17/22  0823 01/17/22  0409 01/16/22  0837 01/16/22  0551 01/15/22  0505 01/15/22  0405 01/14/22  2112 01/14/22  1349   *  --   --   --   --  149*  --   --   --   --   --  146*  --  148*  --  145   < > 140   < > 140   POTASSIUM 2.9*  --   --   --   --  3.8  --  4.0  --  4.3   < > 3.1*   < > 3.2*   < > 3.6  3.6   < > 3.2*   < > 3.2*   CHLORIDE 112*  --   --   --   --  113*  --   --   --   --   --  116*  --  118*  --  118*   < > 116*   < > 108   CO2 30  --   --   --   --  27  --   --   --   --   --  22  --  20*  --  19*   < > 15*   < > 15*   ANIONGAP 10  --   --   --   --  9  --   --   --   --   --  8  --  10  --  8   < > 9   < > 17*   * 127* 168* 155*   < > 189*   < >  --    < >  --    < > 147*   < > 120   < > 113   < > 172*   < > 300*   BUN 38*  --   --   --   --  38*  --   --   --   --   --  31*  --  26*  --  23*   < > 25*   < > 20   CR 1.30  --   --   --   --  1.39*  --   --   --   --   --  1.10  --  1.02  --  0.96   < > 1.40*   < > 1.43*   GFRESTIMATED 64  --   --   --   --  59*  --   --   --   --   --  79  --  86  --  >90   < > 59*   < > 58*   NATACHA 7.8*  --   --   --   --  7.4*  --   --   --   --   --  8.0*  --  7.8*  --  7.5*   < > 7.5*   < > 8.9   MAG 2.2  --   --   --   --   --   --  2.2  --   --   --  2.1  --  2.3  --  1.5*   < >  --   --  2.7*   PHOS 3.1  --   --   --   --   --   --  4.4  --   --   --  1.6*  --   --   --   --   --   --   --   --    PROTTOTAL  --   --   --   --   --   --   --   --   --   --   --   --   --  5.0*  --  4.7*  --  5.4*  --  7.3   ALBUMIN  --   --   --   --   --   --   --   --   --   --   --   --   --  2.4*  --  2.6*  --  2.9*  --  3.6   BILITOTAL  --   --   --   --   --   --   --   --   --   --   --   --   --  0.7  --  0.6  --  0.4  --  0.6   ALKPHOS  --   --   --   --   --   --   --   --   --   --   --   --   --  74  --  75  --  100  --  148   AST  --   --   --   --   --   --    --  37  --   --   --   --   --  131*  --  215*  --  578*  --  175*   ALT  --   --   --   --   --   --   --  56*  --   --   --   --   --  123*  --  166*  --  280*  --  242*    < > = values in this interval not displayed.     CBC  Recent Labs   Lab 01/20/22  0421 01/19/22  0528 01/18/22  1846 01/18/22  0420   WBC 13.8* 15.2* 18.8* 19.5*   RBC 3.12* 3.16* 3.50* 3.33*   HGB 9.4* 9.8* 10.6* 10.1*   HCT 28.6* 29.4* 32.1* 30.8*   MCV 92 93 92 93   MCH 30.1 31.0 30.3 30.3   MCHC 32.9 33.3 33.0 32.8   RDW 15.1* 15.0 14.9 14.7   * 107* 112* 98*     INR  Recent Labs   Lab 01/16/22  0854 01/16/22  0551 01/16/22  0004 01/15/22  0958   INR 1.26* 1.24* 1.29* 1.14     Arterial Blood Gas  Recent Labs   Lab 01/16/22  0551 01/15/22  1510 01/15/22  0959 01/15/22  0819 01/14/22  2114 01/14/22  1350 01/14/22  1349   PH 7.40 7.38 7.27* 7.34* 7.35*   < >  --    PCO2 34* 34* 41  --  35   < >  --    PO2 113* 158* 142*  --  204*  --   --    HCO3 22* 21* 18*  --  20*   < >  --    O2PER 40 40 40  --   --   --  100    < > = values in this interval not displayed.       Cultures: personally reviewed.   7-Day Micro Results    Collected Updated Procedure Result Status    01/14/2022 2240 01/18/2022 1003 Blood Culture Peripheral Blood [[id number]]   Peripheral Blood    Preliminary result Component Value   Culture No growth after 3 days P              01/14/2022 2119 01/18/2022 0824 Respiratory Aerobic Bacterial Culture [[id number]]   (Abnormal)   Washings from Trachea    Preliminary result Component Value   Culture 4+ Normal talib P    3+ Streptococcus agalactiae (Group B Streptococcus) Abnormal  P    This organism is susceptible to ampicillin, penicillin, vancomycin and the cephalosporins. If treatment is required and your patient is allergic to penicillin, contact the microbiology lab within 5 days to request susceptibility testing.    3+ Staphylococcus aureus Abnormal  P              01/14/2022 1415 01/14/2022 1547 Asymptomatic  COVID-19 Virus (Coronavirus) by PCR Nasopharyngeal [[id number]]    Swab from Nasopharyngeal    Final result Component Value   SARS CoV2 PCR Negative   NEGATIVE: SARS-CoV-2 (COVID-19) RNA not detected, presumed negative.          Imaging: personally reviewed.   Results for orders placed or performed during the hospital encounter of 01/14/22   XR Chest Port 1 View    Narrative    EXAM: XR CHEST PORT 1 VIEW  LOCATION: [facility]  DATE/TIME: 1/14/2022 7:25 PM    INDICATION: Endotracheal tube positioning.  COMPARISON: 01/14/2022.      Impression    IMPRESSION:     ET tube tip roughly 7 cm above the david, near the level of the clavicular heads. Enteric tube tip at the distal esophagus; recommend at least 10 cm of advancement.     Hypoexpanded lungs. No significant change of bilateral opacities. No pleural effusion or pneumothorax. Stable cardiomediastinal silhouette.       CT Head w/o Contrast    Narrative    EXAM: CT HEAD W/O CONTRAST  LOCATION: [facility]  DATE/TIME: 1/14/2022 8:07 PM    INDICATION: Cardiac arrest.  COMPARISON: None.  TECHNIQUE: Routine CT Head without IV contrast. Multiplanar reformats. Dose reduction techniques were used.    FINDINGS:  INTRACRANIAL CONTENTS: No evidence of acute intracranial hemorrhage or mass effect. Brain attenuation morphology are normal. The ventricles and sulci are normal for age. Normal gray-white matter differentiation. The basilar cisterns are patent.    VISUALIZED ORBITS/SINUSES/MASTOIDS: The globes are unremarkable. The partially imaged paranasal sinuses, mastoid air cells and middle ear cavities are unremarkable.     BONES/SOFT TISSUES: The visualized skull base and calvarium are unremarkable.      Impression    IMPRESSION:    1.  No evidence of acute intracranial hemorrhage or mass effect.   XR Chest Port 1 View    Narrative    EXAM: XR CHEST PORT 1 VIEW  LOCATION: [facility]  DATE/TIME:  1/17/2022 11:38 AM    INDICATION: increasing O2 needs  COMPARISON: 01/14/2022      Impression    IMPRESSION: Endotracheal tube tip 5.6 cm from david. Nasogastric tube tip projects over the upper stomach. Increasing bilateral pulmonary infiltrates in a bat wing appearance could represent pulmonary edema, pneumonia, hemorrhage, or bilateral pneumonia   such as influenza or Covid.    No effusion. Stable normal heart size.   XR Abdomen Port 1 View    Narrative    EXAM: XR ABDOMEN PORT 1 VIEWS  LOCATION: [facility]  DATE/TIME: 1/17/2022 11:40 AM    INDICATION: OG tube placement verification  COMPARISON: None.      Impression    IMPRESSION: Nasogastric tube tip in proximal port project over the upper stomach. Nonobstructive gas pattern.    XR Chest Port 1 View    Narrative    EXAM: XR CHEST PORT 1 VIEW  LOCATION: [facility]  DATE/TIME: 1/18/2022 6:17 AM    INDICATION: Location of ET tube  COMPARISON: 01/17/2022.      Impression    IMPRESSION: Endotracheal tube tip is 6.5 cm above the david. PICC catheter from right upper extremity approach is in the mid superior vena cava. Enteric tube tip is in the stomach. Again noted are bilateral perihilar airspace opacities consistent with   edema or bilateral pneumonia there has been slight increased consolidation at the left base behind the heart. No pneumothorax or pleural effusion.   Echocardiogram Complete     Value    LVEF  30-35% (moderately reduced)    Narrative    [id number]  [id number]  [id number]    ______________________________________________________________________________  Procedure  Definity ([facility] #25703-345) given intravenously. No hemodynamically significant  valvular abnormalities on 2D or color flow imaging. There is no comparison  study available.  ______________________________________________________________________________  Interpretation Summary     1.The left ventricle is mildly dilated.  2.Left ventricular  function is decreased. The ejection fraction is 30-35%  (moderately reduced).  3.There is anterior wall akinesis.  4.Normal right ventricle size and systolic function.  4.The left atrium is moderately dilated.  5.No hemodynamically significant valvular abnormalities on 2D or color flow  imaging.  6.IVC diameter and respiratory changes fall into an intermediate range  suggesting an RA pressure of 8 mmHg.  7.There is no comparison study available.  ______________________________________________________________________________  I   Left Ventricle  The left ventricle is mildly dilated. Left ventricular function is decreased.  The ejection fraction is 30-35% (moderately reduced). There is borderline  concentric left ventricular hypertrophy. Left ventricular diastolic function  is normal. There is anterior wall akinesis.     Right Ventricle  Normal right ventricle size and systolic function.     Atria  The left atrium is moderately dilated. Right atrial size is normal. There is  no color Doppler evidence of an atrial shunt.     Mitral Valve  Mitral valve leaflets appear normal. There is no evidence of mitral stenosis  or clinically significant mitral regurgitation. There is trace mitral  regurgitation. There is no mitral valve stenosis.     Tricuspid Valve  The tricuspid valve is not well visualized, but is grossly normal. Right  ventricle systolic pressure estimate normal. There is physiologic tricuspid  regurgitation. There is no tricuspid stenosis.     Aortic Valve  Aortic valve leaflets appear normal. There is no evidence of aortic stenosis  or clinically significant aortic regurgitation. The aortic valve is not well  visualized. No aortic regurgitation is present. No aortic stenosis is present.     Pulmonic Valve  The pulmonic valve is not well seen, but is grossly normal. The pulmonic valve  is not well visualized. This degree of valvular regurgitation is within normal  limits. There is no pulmonic valvular  stenosis.     Vessels  The aorta root is normal. Normal size ascending aorta. IVC diameter and  respiratory changes fall into an intermediate range suggesting an RA pressure  of 8 mmHg.     Pericardium  There is no pericardial effusion.     Rhythm  Sinus rhythm was noted.     ______________________________________________________________________________  _________________________________________________  Report approved by: Doyle Hilario 01/15/2022 01:43 PM             Patient Active Problem List   Diagnosis     Cardiac arrest (H)     Essential hypertension     Hypercholesterolemia     Regular astigmatism of both eyes       Belinda Sarkar,   Pulmonary and Critical Care Attending  pgr [phone]

## 2022-01-20 NOTE — PLAN OF CARE
Problem: Communication Impairment (Mechanical Ventilation, Invasive)  Goal: Effective Communication  Outcome: No Change     Problem: Device-Related Complication Risk (Mechanical Ventilation, Invasive)  Goal: Optimal Device Function  Outcome: No Change     Problem: Inability to Wean (Mechanical Ventilation, Invasive)  Goal: Mechanical Ventilation Liberation  Outcome: No Change     Problem: Skin and Tissue Injury (Mechanical Ventilation, Invasive)  Goal: Absence of Device-Related Skin and Tissue Injury  Outcome: No Change     Problem: Ventilator-Induced Lung Injury (Mechanical Ventilation, Invasive)  Goal: Absence of Ventilator-Induced Lung Injury  Outcome: No Change     Marylou Tan, RT

## 2022-01-20 NOTE — PROGRESS NOTES
NEUROLOGY INPATIENT PROGRESS NOTE       [facility] NEUROLOGY [place]  [address] [place] [address]  Tel: [phone]  Fax: [phone]  [email or web address]     ASSESSMENT & PLAN   Hospital Day#: 6  Visit diagnosis: Anoxic encephalopathy    Anoxic encephalopathy  56 years old male with history of HTN, HLD admitted after patient had a cardiac arrest and was found to have LAD occlusion that was treated with drug-eluting stent.    CT head done yesterday did not show any acute abnormality.  Previously CT done on 1/14/2022 was also unremarkable.  MRI could not be completed    EEG shows generalized sharp discharges when patient is off propofol with periodic discharges at times suggesting anoxic brain damage    Continue Keppra 1000 mg twice daily.  Keppra level therapeutic at 24.2    Serum neuro no specific enolase pending    MRI when patient is able to tolerate    Prognosis guarded    Neurology Discharge Planning :   SOHAIL De La Cruz MD  [facility]UROLOG[facility]  (Formerly, [facility], ..)     PROBLEM LIST      Patient Active Problem List   Diagnosis Code     Cardiac arrest (H) I46.9     Essential hypertension I10     Hypercholesterolemia E78.00     Regular astigmatism of both eyes H52.223     Past Medical History:   Patient  has no past medical history on file.     SUBJECTIVE     No change in neuro status.  Repeat EEG when off propofol shows rudimentary periodic discharges that raise the possibility of anoxic brain damage.     OBJECTIVE     Vital signs in last 24 hours  Temp:  [98.1  F (36.7  C)-98.3  F (36.8  C)] 98.3  F (36.8  C)  Pulse:  [61-91] 77  Resp:  [15-20] 16  MAP:  [65 mmHg-116 mmHg] 93 mmHg  Arterial Line BP: ()/(51-89) 133/70  FiO2 (%):  [60 %-80 %] 60 %  SpO2:  [93 %-100 %] 97 %    Weight:   Wt Readings from Last 1 Encounters:   01/20/22 107 kg (235 lb 14.3 oz)         I/O last 24 hours    Intake/Output Summary (Last 24 hours)  at 1/18/2022 1909  Last data filed at 1/18/2022 1849  Gross per 24 hour   Intake 6070.42 ml   Output 7050 ml   Net -979.58 ml     Review of Systems   Pertinent items are noted in HPI.    General Physical Exam: Patient is alert and oriented x 0. Vital signs were reviewed and are documented in EMR. Neck was supple, no Carotid bruit, thyromegaly, JVD or lymphadenopathy noted.  Neurological Exam:  Patient alert and oriented x0 sedated and intubated pupil small and nonreactive.  Doll's eyes are absent.  .  Patient is assisting the vent.  No withdrawal to painful stimuli.  Reflexes 1+, equivocal toes downgoing.     DIAGNOSTIC STUDIES     Pertinent Radiology   Following imaging studies were reviewed:    CT  1/19/22  No acute intracranial process.    1/14/22  No evidence of acute intracranial hemorrhage or mass effect.    EEG  1/19/22  This is a severely abnormal EEG due to periodic discharges when patient is off propofol associated with rudimentary rhythmic activity that suggests electrographic seizure.  Periodic discharges can be seen due to anoxic brain damage, clinical correlation is recommended.  Early part of the tracing shows a burst suppression pattern that likely is pharmacologically induced  1/14/22  This is an abnormal EEG due to diffusely slow background in theta and delta range that suggests nonspecific generalized cerebral dysfunction that intermittently reacts to alerting procedure which is a good prognostic sign.  Such slowing can be seen due to metabolic and toxic abnormalities.  No periodic discharges or a burst suppression pattern was noted to suggest anoxic brain damage.  If clinically indicated, a repeat tracing when patient is off sedating drugs will be helpful in evaluating for any cortical abnormality.     Echocardiogram  Echo result w/o MOPS: Interpretation Summary 1.The left ventricle is mildly dilated.2.Left ventricular function is decreased. The ejection fraction is 30-35%(moderately  reduced).3.There is anterior wall akinesis.4.Normal right ventricle size and systolic function.4.The left atrium is moderately dilated.5.No hemodynamically significant valvular abnormalities on 2D or color flowimaging.6.IVC diameter and respiratory changes fall into an intermediate rangesuggesting an RA pressure of 8 mmHg.7.There is no comparison study available.    Pertinent Labs   Lab Results: Personally Reviewed  Recent Results (from the past 24 hour(s))   Glucose by meter    Collection Time: 01/19/22  8:11 AM   Result Value Ref Range    GLUCOSE BY METER POCT 177 (H) 70 - 99 mg/dL   Vancomycin level    Collection Time: 01/19/22 11:29 AM   Result Value Ref Range    Vancomycin 17.0   mg/L   [facility]; Neuronal Specific enolase (Laboratory Miscellaneous Order)    Collection Time: 01/19/22 11:29 AM   Result Value Ref Range    See Scanned Result       Specimen received. Reordered and sent to performing laboratory. Report to follow up on completion.     Performing Laboratory [facility]     Test Name Neuron-Specific Enolase, Serum     Test Code NSE    Basic metabolic panel    Collection Time: 01/19/22 11:29 AM   Result Value Ref Range    Sodium 149 (H) 136 - 145 mmol/L    Potassium 3.8 3.5 - 5.0 mmol/L    Chloride 113 (H) 98 - 107 mmol/L    Carbon Dioxide (CO2) 27 22 - 31 mmol/L    Anion Gap 9 5 - 18 mmol/L    Urea Nitrogen 38 (H) 8 - 22 mg/dL    Creatinine 1.39 (H) 0.70 - 1.30 mg/dL    Calcium 7.4 (L) 8.5 - 10.5 mg/dL    Glucose 189 (H) 70 - 125 mg/dL    GFR Estimate 59 (L) >60 mL/min/1.73m2   Glucose by meter    Collection Time: 01/19/22 11:40 AM   Result Value Ref Range    GLUCOSE BY METER POCT 156 (H) 70 - 99 mg/dL   Glucose by meter    Collection Time: 01/19/22  3:54 PM   Result Value Ref Range    GLUCOSE BY METER POCT 176 (H) 70 - 99 mg/dL   Glucose by meter    Collection Time: 01/19/22  8:19 PM   Result Value Ref Range    GLUCOSE BY METER POCT 155 (H) 70 - 99 mg/dL   Glucose by  meter    Collection Time: 01/19/22 11:42 PM   Result Value Ref Range    GLUCOSE BY METER POCT 168 (H) 70 - 99 mg/dL   Glucose by meter    Collection Time: 01/20/22  4:20 AM   Result Value Ref Range    GLUCOSE BY METER POCT 127 (H) 70 - 99 mg/dL   Vancomycin level    Collection Time: 01/20/22  4:21 AM   Result Value Ref Range    Vancomycin 16.2   mg/L   Heparin Unfractionated Anti Xa Level    Collection Time: 01/20/22  4:21 AM   Result Value Ref Range    Anti Xa Unfractionated Heparin 0.43 For Reference Range, See Comment IU/mL   Magnesium    Collection Time: 01/20/22  4:21 AM   Result Value Ref Range    Magnesium 2.2 1.8 - 2.6 mg/dL   Phosphorus    Collection Time: 01/20/22  4:21 AM   Result Value Ref Range    Phosphorus 3.1 2.5 - 4.5 mg/dL   CBC with platelets    Collection Time: 01/20/22  4:21 AM   Result Value Ref Range    WBC Count 13.8 (H) 4.0 - 11.0 10e3/uL    RBC Count 3.12 (L) 4.40 - 5.90 10e6/uL    Hemoglobin 9.4 (L) 13.3 - 17.7 g/dL    Hematocrit 28.6 (L) 40.0 - 53.0 %    MCV 92 78 - 100 fL    MCH 30.1 26.5 - 33.0 pg    MCHC 32.9 31.5 - 36.5 g/dL    RDW 15.1 (H) 10.0 - 15.0 %    Platelet Count 116 (L) 150 - 450 10e3/uL   Basic metabolic panel    Collection Time: 01/20/22  4:21 AM   Result Value Ref Range    Sodium 152 (HH) 136 - 145 mmol/L    Potassium 2.9 (L) 3.5 - 5.0 mmol/L    Chloride 112 (H) 98 - 107 mmol/L    Carbon Dioxide (CO2) 30 22 - 31 mmol/L    Anion Gap 10 5 - 18 mmol/L    Urea Nitrogen 38 (H) 8 - 22 mg/dL    Creatinine 1.30 0.70 - 1.30 mg/dL    Calcium 7.8 (L) 8.5 - 10.5 mg/dL    Glucose 136 (H) 70 - 125 mg/dL    GFR Estimate 64 >60 mL/min/1.73m2         HOSPITAL MEDICATIONS       albuterol  2.5 mg Nebulization 2 times daily     artificial tears   Both Eyes Q8H     aspirin  81 mg Oral or Feeding Tube Daily     atorvastatin  80 mg Per OG Tube QPM     [Held by provider] carvedilol  12.5 mg Oral or Feeding Tube BID     chlorhexidine  15 mL Mouth/Throat Q12H     insulin aspart  1-6 Units  Subcutaneous Q4H     levETIRAcetam  1,000 mg Intravenous Q12H     metoclopramide  10 mg Intravenous Q8H     pantoprazole (PROTONIX) IV  40 mg Intravenous QAM AC     piperacillin-tazobactam  3.375 g Intravenous Q8H     potassium chloride  20 mEq Intravenous Q1H     protein modular  1 packet Per Feeding Tube BID     sodium chloride (PF)  10-40 mL Intracatheter Q7 Days     ticagrelor  90 mg Oral or Feeding Tube BID     vancomycin  1,500 mg Intravenous Q24H        Total time spent for face to face visit, reviewing labs/imaging studies, counseling and coordination of care was: 30 Minutes More than 50% of this time was spent on counseling and coordination of care.        This note was dictated using voice recognition software.  Any grammatical or context distortions are unintentional and inherent to the software.

## 2022-01-20 NOTE — PROGRESS NOTES
Pt is off Levophed since 2300. BP goes up to 130's-140's/70's with stimulation. Gradually weaning sedation down. Pt now has a strong cough with ETT suctioning but no gag with deep oral sx. After repositioning, pt noted to have blinking motion of closed eyes. Opened eyes manually and no nystagmus or eye movements present. Eyelid fluttering stopped spontaneously, lasting < 1min. Pt still does not grimace or withdraw from pain X4 extremities.

## 2022-01-20 NOTE — PROGRESS NOTES
HEART CARE NOTE          Assessment/Recommendations     1. VT/VF cardiac arrest and cardiogenic shock  Assessment / Plan    Witnessed arrest and bystander CPR with ~ 15 mins down time; S/p PCI to proximal LAD    No recurrence of non-sustained VT; continue hold amiodarone given VT/VF occurred in the setting of acute ischemia now s/p revascularization; didn't have short run if afib overnight, but no recurrence since repleting electrolytes     2. HFrEF/ICM c/b VT/VF arrest  Assessment / Plan    Improvement in diuresis on current regimen with robust response--> will decrease furosemide gtt to 10 ; continue to monitor UOP and hemodynamics closely    GDMT on hold given the above     3. CAD/ICM c/b STEMI  Assessment / Plan    Acute anterior MI; S/p PCI to LAD    Cangrelor gtt transitioned to ticagreloe    Neurology following --> assessment of neurologic recovery in process     Patient remains critically ill in the ICU s/p cardiac arrest and PCI in the setting of VT/VF; Completed hypothermic protocol and currently on mechanical ventilation while awaiting neurologic recovery. 45 minutes spent on critical care.    History of Present Illness/Subjective      Mr. Jonathan De La Rosa is a 56 year old male with a PMHx significant for HTN and HLD admitted in cardiogenic shock after VT/VF arrest in the setting of acute STEMI.     Today, patient is intubated and sedated     ECG: Personally reviewed. normal sinus rhythm, fusion complexes; acute MI.     ECHO (personnaly Reviewed):   1.The left ventricle is mildly dilated.  2.Left ventricular function is decreased. The ejection fraction is 30-35%  (moderately reduced).  3.There is anterior wall akinesis.  4.Normal right ventricle size and systolic function.  4.The left atrium is moderately dilated.  5.No hemodynamically significant valvular abnormalities on 2D or color flow  imaging.  6.IVC diameter and respiratory changes fall into an intermediate range  suggesting an RA pressure of 8  "mmHg.  7.There is no comparison study available.          Physical Examination Review of Systems   /62   Pulse 80   Temp 98.3  F (36.8  C) (Oral)   Resp 16   Ht 1.854 m (6' 1\")   Wt 107 kg (235 lb 14.3 oz)   SpO2 99%   BMI 31.12 kg/m    Body mass index is 31.12 kg/m .  Wt Readings from Last 3 Encounters:   01/20/22 107 kg (235 lb 14.3 oz)   01/14/22 70 kg (154 lb 5.2 oz)     General Appearance:   no distress, normal body habitus   ENT/Mouth: membranes moist, no oral lesions or bleeding gums.      EYES:  no scleral icterus, normal conjunctivae   Neck: no carotid bruits or thyromegaly   Chest/Lungs:   lungs are clear to auscultation, no rales or wheezing, equal chest wall expansion    Cardiovascular:   Regular. Normal first and second heart sounds with no murmurs, rubs, or gallops; the carotid, radial and posterior tibial pulses are intact, + JVD and LE edema bilaterally    Abdomen:  no organomegaly, masses, bruits, or tenderness; bowel sounds are present   Extremities: no cyanosis or clubbing   Skin: no xanthelasma, warm.    Neurologic: Intubated/sedated     Psychiatric: Intubated/sedated     A complete 10 systems ROS was reviewed  And is negative except what is listed in the HPI.          Medical History  Surgical History Family History Social History   No past medical history on file. Past Surgical History:   Procedure Laterality Date     CV HEART CATHETERIZATION WITH POSSIBLE INTERVENTION N/A 1/14/2022    Procedure: coronary angiogram;  Surgeon: Leo Gallegos MD;  Location:  HEART CARDIAC CATH LAB     CV INTRAVASULAR ULTRASOUND N/A 1/14/2022    Procedure: Intravascular Ultrasound;  Surgeon: Leo Gallegos MD;  Location:  HEART CARDIAC CATH LAB     CV LEFT HEART CATH N/A 1/14/2022    Procedure: Left Heart Cath;  Surgeon: Leo Gallegos MD;  Location:  HEART CARDIAC CATH LAB     CV PCI STENT DRUG ELUTING N/A 1/14/2022    Procedure: Percutaneous Coronary Intervention Stent Drug Eluting;  " Surgeon: Leo Gallegos MD;  Location: [facility] HEART CARDIAC CATH LAB     CV THERAPEUTIC HYPOTHERMIA N/A 1/14/2022    Procedure: Therapeutic Hypothermia;  Surgeon: Leo Gallegos MD;  Location: [facility] HEART CARDIAC CATH LAB     CV THROMBECTOMY CORONARY N/A 1/14/2022    Procedure: Thrombectomy Coronary;  Surgeon: Leo Gallegos MD;  Location: [facility] HEART CARDIAC CATH LAB    no family history of premature coronary artery disease Social History     Socioeconomic History     Marital status:      Spouse name: Not on file     Number of children: Not on file     Years of education: Not on file     Highest education level: Not on file   Occupational History     Not on file   Tobacco Use     Smoking status: Not on file     Smokeless tobacco: Not on file   Substance and Sexual Activity     Alcohol use: Not on file     Drug use: Not on file     Sexual activity: Not on file   Other Topics Concern     Not on file   Social History Narrative     Not on file     Social Determinants of Health     Financial Resource Strain: Not on file   Food Insecurity: Not on file   Transportation Needs: Not on file   Physical Activity: Not on file   Stress: Not on file   Social Connections: Not on file   Intimate Partner Violence: Not on file   Housing Stability: Not on file           Lab Results    Chemistry/lipid CBC Cardiac Enzymes/BNP/TSH/INR   Lab Results   Component Value Date    TRIG 228 (H) 01/18/2022    BUN 38 (H) 01/20/2022     (HH) 01/20/2022    CO2 30 01/20/2022    Lab Results   Component Value Date    WBC 13.8 (H) 01/20/2022    HGB 9.4 (L) 01/20/2022    HCT 28.6 (L) 01/20/2022    MCV 92 01/20/2022     (L) 01/20/2022    Lab Results   Component Value Date    INR 1.26 (H) 01/16/2022     No results found for: CKTOTAL, CKMB, TROPONINI       Weight:    Wt Readings from Last 3 Encounters:   01/20/22 107 kg (235 lb 14.3 oz)   01/14/22 70 kg (154 lb 5.2 oz)       Allergies  Allergies   Allergen Reactions     Penicillins Hives  and Rash         Surgical History  Past Surgical History:   Procedure Laterality Date     CV HEART CATHETERIZATION WITH POSSIBLE INTERVENTION N/A 1/14/2022    Procedure: coronary angiogram;  Surgeon: Leo Gallegos MD;  Location:  HEART CARDIAC CATH LAB     CV INTRAVASULAR ULTRASOUND N/A 1/14/2022    Procedure: Intravascular Ultrasound;  Surgeon: Leo Gallegos MD;  Location:  HEART CARDIAC CATH LAB     CV LEFT HEART CATH N/A 1/14/2022    Procedure: Left Heart Cath;  Surgeon: Leo Gallegos MD;  Location:  HEART CARDIAC CATH LAB     CV PCI STENT DRUG ELUTING N/A 1/14/2022    Procedure: Percutaneous Coronary Intervention Stent Drug Eluting;  Surgeon: Leo Gallegos MD;  Location:  HEART CARDIAC CATH LAB     CV THERAPEUTIC HYPOTHERMIA N/A 1/14/2022    Procedure: Therapeutic Hypothermia;  Surgeon: Leo Gallegos MD;  Location:  HEART CARDIAC CATH LAB     CV THROMBECTOMY CORONARY N/A 1/14/2022    Procedure: Thrombectomy Coronary;  Surgeon: Leo Gallegos MD;  Location:  HEART CARDIAC CATH LAB       Social History  Tobacco:   History   Smoking Status     Not on file   Smokeless Tobacco     Not on file    Alcohol:   Social History    Substance and Sexual Activity      Alcohol use: Not on file   Illicit Drugs:   History   Drug Use Not on file       Family History  No family history on file.       Patricia Darling MD on 1/20/2022      cc: Praveen Loaiza

## 2022-01-20 NOTE — PLAN OF CARE
Problem: Adult Inpatient Plan of Care  Goal: Optimal Comfort and Wellbeing  Outcome: No Change  Goal: Readiness for Transition of Care  Outcome: No Change     Problem: Risk for Delirium  Goal: Improved Behavioral Control  Outcome: No Change  Goal: Improved Attention and Thought Clarity  Outcome: No Change  Goal: Improved Sleep  Outcome: No Change     Problem: Adjustment to Illness (Targeted Temperature Management)  Goal: Optimal Response to Life-Threatening Event  Outcome: No Change     Problem: Dysrhythmia (Targeted Temperature Management)  Goal: Stable Cardiac Rate and Rhythm  Outcome: No Change     Problem: Infection (Targeted Temperature Management)  Goal: Absence of Infection Signs and Symptoms  Outcome: No Change     Problem: Communication Impairment (Mechanical Ventilation, Invasive)  Goal: Effective Communication  Outcome: No Change     Problem: Device-Related Complication Risk (Mechanical Ventilation, Invasive)  Goal: Optimal Device Function  Outcome: No Change     Problem: Adult Inpatient Plan of Care  Goal: Plan of Care Review  Flowsheets (Taken 1/20/2022 1705)  Plan of Care Reviewed With: spouse  Progress: no change  Goal: Absence of Hospital-Acquired Illness or Injury  Intervention: Identify and Manage Fall Risk  Recent Flowsheet Documentation  Taken 1/20/2022 1600 by Dianna Camarillo RN  Safety Promotion/Fall Prevention: activity supervised  Taken 1/20/2022 1200 by Dianna Camarillo RN  Safety Promotion/Fall Prevention: activity supervised  Taken 1/20/2022 0800 by Dianna Camarillo RN  Safety Promotion/Fall Prevention: activity supervised  Intervention: Prevent Skin Injury  Recent Flowsheet Documentation  Taken 1/20/2022 1600 by Dianna Camarillo RN  Body Position:   turned   right  Taken 1/20/2022 1400 by Dianna Camarillo RN  Body Position:   turned   supine  Taken 1/20/2022 1200 by Dianna Camarillo RN  Body Position:   turned   right  Taken 1/20/2022 1000 by Marquise  Dianna MATTSON RN  Body Position:   turned   supine  Taken 1/20/2022 0800 by Dianna Camarillo RN  Body Position:   turned   right  Intervention: Prevent and Manage VTE (Venous Thromboembolism) Risk  Recent Flowsheet Documentation  Taken 1/20/2022 1600 by Dianna Camarillo RN  VTE Prevention/Management: anticoagulant therapy maintained  Taken 1/20/2022 1200 by Dianna Camarillo RN  VTE Prevention/Management: anticoagulant therapy maintained  Taken 1/20/2022 0800 by Dianna Camarillo RN  VTE Prevention/Management: anticoagulant therapy maintained     Problem: Inability to Wean (Mechanical Ventilation, Invasive)  Goal: Mechanical Ventilation Liberation  Intervention: Promote Extubation and Mechanical Ventilation Liberation  Recent Flowsheet Documentation  Taken 1/20/2022 1600 by Dianna Camarillo RN  Medication Review/Management: medications reviewed  Taken 1/20/2022 1200 by Dianna Camarillo RN  Medication Review/Management: medications reviewed  Taken 1/20/2022 0800 by Dianna Camarillo RN  Medication Review/Management: medications reviewed     Problem: Ventilator-Induced Lung Injury (Mechanical Ventilation, Invasive)  Goal: Absence of Ventilator-Induced Lung Injury  Intervention: Prevent Ventilator-Associated Pneumonia  Recent Flowsheet Documentation  Taken 1/20/2022 1600 by Dianna Camarillo RN  Oral Care:   mouth wash rinse   oral rinse provided   swabbed with antiseptic solution  Head of Bed (HOB) Positioning: HOB at 20-30 degrees  Taken 1/20/2022 1400 by Dianna Camarilol RN  Head of Bed (HOB) Positioning: HOB at 20-30 degrees  Taken 1/20/2022 1200 by Dianna Camarillo RN  Oral Care:   mouth wash rinse   oral rinse provided   swabbed with antiseptic solution  Head of Bed (HOB) Positioning: HOB at 20-30 degrees  Taken 1/20/2022 1000 by Dianna Camarillo RN  Head of Bed (HOB) Positioning: HOB at 20-30 degrees  Taken 1/20/2022 0800 by Dianna Camarillo RN  Oral Care:    mouth wash rinse   oral rinse provided   swabbed with antiseptic solution  Head of Bed (HOB) Positioning: HOB at 20-30 degrees   Pt remains sedated and intubated.  Sedation increased for MRI today.  Frequent coughing w/elevated BP and HR

## 2022-01-21 NOTE — PROGRESS NOTES
HEART CARE NOTE          Assessment/Recommendations     1. VT/VF cardiac arrest and cardiogenic shock  Assessment / Plan    Witnessed arrest and bystander CPR with ~ 15 mins down time; S/p PCI to proximal LAD    No recurrence of non-sustained VT; continue hold amiodarone given VT/VF occurred in the setting of acute ischemia now s/p revascularization    Continue to monitor and replete electrolytes PRN; goal Mg >2 and K of 4     2. HFrEF/ICM c/b VT/VF arrest  Assessment / Plan    Continues to have robust response-to IV diuresis-> will decrease furosemide gtt to 5 ; continue to monitor UOP and hemodynamics closely    GDMT on hold given the above     3. CAD/ICM c/b STEMI  Assessment / Plan    Acute anterior MI; S/p PCI to LAD    Cangrelor gtt transitioned to ticagreloe    Neurology following --> assessment of neurologic recovery in process     Patient remains critically ill in the ICU s/p cardiac arrest and PCI in the setting of VT/VF; Completed hypothermic protocol and currently on mechanical ventilation while awaiting neurologic recovery. 35 minutes spent on critical care.    History of Present Illness/Subjective      Mr. Jonathan De La Rosa is a 56 year old male with a PMHx significant for HTN and HLD admitted in cardiogenic shock after VT/VF arrest in the setting of acute STEMI.     Today, patient is intubated and sedated     ECG: Personally reviewed. normal sinus rhythm, fusion complexes; acute MI.     ECHO (personnaly Reviewed):   1.The left ventricle is mildly dilated.  2.Left ventricular function is decreased. The ejection fraction is 30-35%  (moderately reduced).  3.There is anterior wall akinesis.  4.Normal right ventricle size and systolic function.  4.The left atrium is moderately dilated.  5.No hemodynamically significant valvular abnormalities on 2D or color flow  imaging.  6.IVC diameter and respiratory changes fall into an intermediate range  suggesting an RA pressure of 8 mmHg.  7.There is no comparison  "study available.          Physical Examination Review of Systems   /58   Pulse 81   Temp 98.9  F (37.2  C) (Oral)   Resp 18   Ht 1.854 m (6' 1\")   Wt 101.5 kg (223 lb 12.3 oz)   SpO2 99%   BMI 29.52 kg/m    Body mass index is 29.52 kg/m .  Wt Readings from Last 3 Encounters:   01/21/22 101.5 kg (223 lb 12.3 oz)   01/14/22 70 kg (154 lb 5.2 oz)     General Appearance:   no distress, normal body habitus   ENT/Mouth: membranes moist, no oral lesions or bleeding gums.      EYES:  no scleral icterus, normal conjunctivae   Neck: no carotid bruits or thyromegaly   Chest/Lungs:   lungs are clear to auscultation, no rales or wheezing, equal chest wall expansion    Cardiovascular:   Regular. Normal first and second heart sounds with no murmurs, rubs, or gallops; the carotid, radial and posterior tibial pulses are intact, + JVD and LE edema bilaterally    Abdomen:  no organomegaly, masses, bruits, or tenderness; bowel sounds are present   Extremities: no cyanosis or clubbing   Skin: no xanthelasma, warm.    Neurologic: Intubated/sedated     Psychiatric: Intubated/sedated     A complete 10 systems ROS was reviewed  And is negative except what is listed in the HPI.          Medical History  Surgical History Family History Social History   No past medical history on file. Past Surgical History:   Procedure Laterality Date     CV HEART CATHETERIZATION WITH POSSIBLE INTERVENTION N/A 1/14/2022    Procedure: coronary angiogram;  Surgeon: Leo Gallegos MD;  Location:  [facility]ART CARDIAC CATH LAB     CV INTRAVASULAR ULTRASOUND N/A 1/14/2022    Procedure: Intravascular Ultrasound;  Surgeon: Leo Gallegos MD;  Location:  HEART CARDIAC CATH LAB     CV LEFT HEART CATH N/A 1/14/2022    Procedure: Left Heart Cath;  Surgeon: Leo Gallegos MD;  Location:  HEART CARDIAC CATH LAB     CV PCI STENT DRUG ELUTING N/A 1/14/2022    Procedure: Percutaneous Coronary Intervention Stent Drug Eluting;  Surgeon: Leo Gallegos, " MD;  Location: [facility]     CV THERAPEUTIC HYPOTHERMIA N/A 1/14/2022    Procedure: Therapeutic Hypothermia;  Surgeon: Leo Gallegos MD;  Location: [facility]     CV THROMBECTOMY CORONARY N/A 1/14/2022    Procedure: Thrombectomy Coronary;  Surgeon: Leo Gallegos MD;  Location: [facility]    no family history of premature coronary artery disease Social History     Socioeconomic History     Marital status:      Spouse name: Not on file     Number of children: Not on file     Years of education: Not on file     Highest education level: Not on file   Occupational History     Not on file   Tobacco Use     Smoking status: Not on file     Smokeless tobacco: Not on file   Substance and Sexual Activity     Alcohol use: Not on file     Drug use: Not on file     Sexual activity: Not on file   Other Topics Concern     Not on file   Social History Narrative     Not on file     Social Determinants of Health     Financial Resource Strain: Not on file   Food Insecurity: Not on file   Transportation Needs: Not on file   Physical Activity: Not on file   Stress: Not on file   Social Connections: Not on file   Intimate Partner Violence: Not on file   Housing Stability: Not on file           Lab Results    Chemistry/lipid CBC Cardiac Enzymes/BNP/TSH/INR   Lab Results   Component Value Date    TRIG 228 (H) 01/18/2022    BUN 31 (H) 01/21/2022     (HH) 01/21/2022    CO2 36 (H) 01/21/2022    Lab Results   Component Value Date    WBC 10.9 01/21/2022    HGB 9.9 (L) 01/21/2022    HCT 30.1 (L) 01/21/2022    MCV 92 01/21/2022     (L) 01/21/2022    Lab Results   Component Value Date    INR 1.26 (H) 01/16/2022     No results found for: CKTOTAL, CKMB, TROPONINI       Weight:    Wt Readings from Last 3 Encounters:   01/21/22 101.5 kg (223 lb 12.3 oz)   01/14/22 70 kg (154 lb 5.2 oz)       Allergies  Allergies   Allergen Reactions     Penicillins Hives and Rash         Surgical  History  Past Surgical History:   Procedure Laterality Date     CV HEART CATHETERIZATION WITH POSSIBLE INTERVENTION N/A 1/14/2022    Procedure: coronary angiogram;  Surgeon: Leo Gallegos MD;  Location:  HEART CARDIAC CATH LAB     CV INTRAVASULAR ULTRASOUND N/A 1/14/2022    Procedure: Intravascular Ultrasound;  Surgeon: Leo Gallegos MD;  Location:  HEART CARDIAC CATH LAB     CV LEFT HEART CATH N/A 1/14/2022    Procedure: Left Heart Cath;  Surgeon: Leo Gallegos MD;  Location:  HEART CARDIAC CATH LAB     CV PCI STENT DRUG ELUTING N/A 1/14/2022    Procedure: Percutaneous Coronary Intervention Stent Drug Eluting;  Surgeon: Leo Gallegos MD;  Location:  HEART CARDIAC CATH LAB     CV THERAPEUTIC HYPOTHERMIA N/A 1/14/2022    Procedure: Therapeutic Hypothermia;  Surgeon: Leo Gallegos MD;  Location:  HEART CARDIAC CATH LAB     CV THROMBECTOMY CORONARY N/A 1/14/2022    Procedure: Thrombectomy Coronary;  Surgeon: Leo Gallegos MD;  Location:  HEART CARDIAC CATH LAB       Social History  Tobacco:   History   Smoking Status     Not on file   Smokeless Tobacco     Not on file    Alcohol:   Social History    Substance and Sexual Activity      Alcohol use: Not on file   Illicit Drugs:   History   Drug Use Not on file       Family History  No family history on file.       Patricia Darling MD on 1/21/2022      cc: Praveen Loaiza

## 2022-01-21 NOTE — PLAN OF CARE
Problem: Communication Impairment (Mechanical Ventilation, Invasive)  Goal: Effective Communication  Outcome: No Change     Problem: Device-Related Complication Risk (Mechanical Ventilation, Invasive)  Goal: Optimal Device Function  Outcome: No Change     Problem: Inability to Wean (Mechanical Ventilation, Invasive)  Goal: Mechanical Ventilation Liberation  Outcome: No Change     Hermelindo Conde RT on 1/21/2022 at 5:37 AM

## 2022-01-21 NOTE — PROGRESS NOTES
Care Management Follow Up    Length of Stay (days): 7    Expected Discharge Date: 01/27/2022     Concerns to be Addressed:     Requires ICU level of care due to intubation, ventilatory support, and IV medications. Neurology following-Anoxic Brain Injury  Patient plan of care discussed at interdisciplinary rounds: Yes    Additional Information:  Per note from Neurology work up indicative of Anoxic Brain Injury; family flying in from [place] today and they are leaning towards comfort cares. Care manager to follow along as needed.       Julia Sierra RN

## 2022-01-21 NOTE — PROGRESS NOTES
.  Patient remained on full vent support. Suctioned moderate amount of thin secretions.Size 7.5 ETT remains in position at 25@T.   ETT was moved from side-to-side to prevent skin breakdown on lips. HOB approx 30 degrees. Current vent settings are below:  RT will continue to follow.    Ventilation Mode: CMV/AC  (Continuous Mandatory Ventilation/ Assist Control)  FiO2 (%): 50 %  Rate Set (breaths/minute): 16 breaths/min  Tidal Volume Set (mL): 500 mL  PEEP (cm H2O): 14 cmH2O  Oxygen Concentration (%): 50 %  Resp: (!) 109      Hermelindo Conde, RT on 1/20/2022 at 8:47 PM

## 2022-01-21 NOTE — PLAN OF CARE
Problem: Adult Inpatient Plan of Care  Goal: Plan of Care Review  Outcome: Declining  Patient care transitioned to comfort care.  Patient now extubated.  Wife at bedside at this time.  PRN Morphine given PRN Lorazepam given.  Will continue to assess if further medications are needed.  Patient is currently comfortable in appearance

## 2022-01-21 NOTE — PROGRESS NOTES
RESPIRATORY CARE     VENT DAY #:  6; intubated on 1/14/22     ETT SIZE + PLACEMENT: 7.5 secured 25 cm at the teeth     CURRENT VENT SETTINGS:  Mode: AC  Rate: 16  Tidal volume: 500  PEEP: +10  FiO2: 40%     BREATH SOUNDS: Coarse     SECRETIONS: Copious tan/red-streaked secretions     RESPIRATORY MEDS: Albuterol BID     SBT: No; PEEP>8     ABG:     Ref. Range 1/20/2022 08:45   pH Arterial Latest Ref Range: 7.37 - 7.44  7.41   pCO2 Arterial Latest Ref Range: 35 - 45 mm Hg 53 (H)   PO2 Arterial Latest Ref Range: 80 - 90 mm Hg 80   Bicarbonate Arterial Latest Ref Range: 23 - 29 mmol/L 31 (H)      NOTE/PLAN: PEEP decreased to 10 at 1122. Pt transitioned to comfort cares and was extubated at 1445. Pt placed on 2 lpm nasal cannula for comfort.     Renee Whiting, RT

## 2022-01-21 NOTE — PROGRESS NOTES
Critical Care Progress Note      01/21/2022    Name: Jonathan De La Rosa MRN#: [id number]   Age: 56 year old YOB: 1965     Hsptl Day# 7  ICU DAY #    MV DAY #             Problem List:   Acute respiratory failure  Status postcardiac arrest  STEMI  Postcardiac arrest shock  Leukocytosis  Acute kidney injury versus CKD.  Unknown baseline.  History of hypertension and hyperlipidemia    Clinically Significant Risk Factors Present on Admission                           Summary/Hospital Course:   55 yo male with hx of HTN and HLD. Per wife he developed chest pain on 1/13 at noon. Today he collapsed at home and his wife started CPR. When EMS arrived he had a shockable rhythm and shocked x2 at home and once en route to the hospital.  He was taken to the cath lab after EKG showed an anterior STEMI.   A clot was extracted from the proximal LAD followed by a JUAN JOSE placement.    He was started on TTM and transferred to [facility]        Assessment and plan :        I have personally reviewed the daily labs, imaging studies, cultures and discussed the case with referring physician and consulting physicians.     My assessment and plan by system for this patient is as follows:    Neurology/Psychiatry:   Findings consistent with anoxic brain injury.  MRI and EEG both abnormal.  Elevated NSE.    Cardiovascular:   STEMI with cardiac arrest.  S/p JUAN JOSE in proximal LAD.    Status post TTM.    Off pressors.  LV EF 30 to 35%.    Lower extremity DVT plus thrombus seen in IVC.  On heparin drip  3.7 L positive.  Continue Lasix drip.    Pulmonary/Ventilator Management:   Acute respiratory failure.   Continue mechanical ventilation.  No plan for weaning      GI and Nutrition :   Tolerating tube feeds    Renal/Fluids/Electrolytes:   RICHELLE, improving.  Volume overloaded.  On furosemide drip.    Hypernatremia.  Started metolazone and give 1 L of D5W    - monitor function and electrolytes as needed with replacement per ICU protocols.  -  generally avoid nephrotoxic agents such as NSAID, IV contrast unless specifically required  - adjust medications as needed for renal clearance  - follow I/O's as appropriate.    Endocrine:   - ICU insulin protocol, goal sugar <180    ID:  Sputum growing MSSA and strep agalactiae  DC vancomycin continue Zosyn      ICU Prophylaxis:   1. DVT: mechanical  2. VAP: HOB 30 degrees, chlorhexidine rinse  3. Stress Ulcer: PPI                   Key Medications:       albuterol  2.5 mg Nebulization 2 times daily     artificial tears   Both Eyes Q8H     aspirin  81 mg Oral or Feeding Tube Daily     atorvastatin  80 mg Per OG Tube QPM     [Held by provider] carvedilol  12.5 mg Oral or Feeding Tube BID     chlorhexidine  15 mL Mouth/Throat Q12H     dextrose 5%  1,000 mL Intravenous Once     insulin aspart  1-6 Units Subcutaneous Q4H     levETIRAcetam  1,000 mg Intravenous Q12H     pantoprazole (PROTONIX) IV  40 mg Intravenous QAM AC     piperacillin-tazobactam  3.375 g Intravenous Q8H     protein modular  1 packet Per Feeding Tube BID     sodium chloride (PF)  10-40 mL Intracatheter Q7 Days     ticagrelor  90 mg Oral or Feeding Tube BID     vancomycin  1,500 mg Intravenous Q24H       dextrose       fentaNYL 125 mcg/hr (01/21/22 0704)     furosemide (LASIX) infusion ADULT STANDARD 5 mg/hr (01/21/22 0927)     heparin 2,100 Units/hr (01/21/22 0437)     midazolam Stopped (01/16/22 2328)     norepinephrine Stopped (01/19/22 2259)     propofol (DIPRIVAN) infusion 55 mcg/kg/min (01/21/22 0730)               Physical Examination:   Temp:  [98.8  F (37.1  C)-99.1  F (37.3  C)] 98.9  F (37.2  C)  Pulse:  [79-97] 89  Resp:  [12-24] 18  BP: (132-139)/(58-64) 132/58  MAP:  [68 mmHg-121 mmHg] 74 mmHg  Arterial Line BP: ()/(51-89) 104/57  FiO2 (%):  [40 %-60 %] 40 %  SpO2:  [89 %-100 %] 99 %  No intake or output data in the 24 hours ending 01/14/22 2024  Wt Readings from Last 4 Encounters:   01/21/22 101.5 kg (223 lb 12.3 oz)   01/14/22 70  kg (154 lb 5.2 oz)     Arterial Line BP: ()/(51-89) 104/57  MAP:  [68 mmHg-121 mmHg] 74 mmHg  Ventilation Mode: CMV/AC  (Continuous Mandatory Ventilation/ Assist Control)  FiO2 (%): 40 %  Rate Set (breaths/minute): 16 breaths/min  Tidal Volume Set (mL): 500 mL  PEEP (cm H2O): 14 cmH2O  Oxygen Concentration (%): 40 %  Resp: 18    Recent Labs   Lab 01/20/22  0845 01/16/22  0551 01/15/22  1510 01/15/22  0959   PH 7.41 7.40 7.38 7.27*   PCO2 53* 34* 34* 41   PO2 80 113* 158* 142*   HCO3 31* 22* 21* 18*   O2PER 50 40 40 40       GEN: no acute distress   HEENT: head ncat, sclera anicteric, OP patent, trachea midline   PULM: unlabored synchronous with vent, clear anteriorly    CV/COR: RRR S1S2 no gallop,  No rub, no murmur  ABD: soft nontender, hypoactive bowel sounds, no mass  EXT:  No edema  NEURO: sedated  SKIN: no obvious rash  LINES: clean, dry intact         Data:   All data and imaging reviewed     ROUTINE ICU LABS (Last four results)  CMP  Recent Labs   Lab 01/21/22  1024 01/21/22  0930 01/21/22  0355 01/20/22  2346 01/20/22  1942 01/20/22  1910 01/20/22  1203 01/20/22  1035 01/20/22  0809 01/20/22  0421 01/19/22  1140 01/19/22  1129 01/19/22  0811 01/19/22  0528 01/18/22  0900 01/18/22  0420 01/17/22  0823 01/17/22  0409 01/16/22  0837 01/16/22  0551 01/15/22  0505 01/15/22  0405 01/14/22  2112 01/14/22  1349   NA  --   --  152*  --   --   --   --   --   --  152*  --  149*  --   --   --  146*  --  148*  --  145   < > 140   < > 140   POTASSIUM 3.5  --  3.3*  --   --  2.9*  --  3.1*  --  2.9*  --  3.8  --  4.0   < > 3.1*   < > 3.2*   < > 3.6  3.6   < > 3.2*   < > 3.2*   CHLORIDE  --   --  109*  --   --   --   --   --   --  112*  --  113*  --   --   --  116*  --  118*  --  118*   < > 116*   < > 108   CO2  --   --  36*  --   --   --   --   --   --  30  --  27  --   --   --  22  --  20*  --  19*   < > 15*   < > 15*   ANIONGAP  --   --  7  --   --   --   --   --   --  10  --  9  --   --   --  8  --  10  --  8   <  > 9   < > 17*   GLC  --  148* 189*  168* 118*   < >  --    < >  --    < > 136*   < > 189*   < >  --    < > 147*   < > 120   < > 113   < > 172*   < > 300*   BUN  --   --  31*  --   --   --   --   --   --  38*  --  38*  --   --   --  31*  --  26*  --  23*   < > 25*   < > 20   CR  --   --  1.12  --   --   --   --   --   --  1.30  --  1.39*  --   --   --  1.10  --  1.02  --  0.96   < > 1.40*   < > 1.43*   GFRESTIMATED  --   --  77  --   --   --   --   --   --  64  --  59*  --   --   --  79  --  86  --  >90   < > 59*   < > 58*   NATACHA  --   --  8.1*  --   --   --   --   --   --  7.8*  --  7.4*  --   --   --  8.0*  --  7.8*  --  7.5*   < > 7.5*   < > 8.9   MAG  --   --  2.3  --   --   --   --   --   --  2.2  --   --   --  2.2  --  2.1  --  2.3  --  1.5*   < >  --   --  2.7*   PHOS  --   --  2.8  --   --   --   --   --   --  3.1  --   --   --  4.4  --  1.6*  --   --   --   --   --   --   --   --    PROTTOTAL  --   --   --   --   --   --   --   --   --   --   --   --   --   --   --   --   --  5.0*  --  4.7*  --  5.4*  --  7.3   ALBUMIN  --   --   --   --   --   --   --   --   --   --   --   --   --   --   --   --   --  2.4*  --  2.6*  --  2.9*  --  3.6   BILITOTAL  --   --   --   --   --   --   --   --   --   --   --   --   --   --   --   --   --  0.7  --  0.6  --  0.4  --  0.6   ALKPHOS  --   --   --   --   --   --   --   --   --   --   --   --   --   --   --   --   --  74  --  75  --  100  --  148   AST  --   --   --   --   --   --   --   --   --   --   --   --   --  37  --   --   --  131*  --  215*  --  578*  --  175*   ALT  --   --   --   --   --   --   --   --   --   --   --   --   --  56*  --   --   --  123*  --  166*  --  280*  --  242*    < > = values in this interval not displayed.     CBC  Recent Labs   Lab 01/21/22  0355 01/20/22  0421 01/19/22  0528 01/18/22  1846   WBC 10.9 13.8* 15.2* 18.8*   RBC 3.26* 3.12* 3.16* 3.50*   HGB 9.9* 9.4* 9.8* 10.6*   HCT 30.1* 28.6* 29.4* 32.1*   MCV 92 92 93 92   MCH 30.4 30.1  31.0 30.3   MCHC 32.9 32.9 33.3 33.0   RDW 15.1* 15.1* 15.0 14.9   * 116* 107* 112*     INR  Recent Labs   Lab 01/16/22  0854 01/16/22  0551 01/16/22  0004 01/15/22  0958   INR 1.26* 1.24* 1.29* 1.14     Arterial Blood Gas  Recent Labs   Lab 01/20/22  0845 01/16/22  0551 01/15/22  1510 01/15/22  0959   PH 7.41 7.40 7.38 7.27*   PCO2 53* 34* 34* 41   PO2 80 113* 158* 142*   HCO3 31* 22* 21* 18*   O2PER 50 40 40 40       All cultures:  No results for input(s): CULT in the last 168 hours.  Recent Results (from the past 24 hour(s))   MR Brain w/o Contrast    Narrative    EXAM: MR BRAIN W/O CONTRAST  LOCATION: [facility]  DATE/TIME: 1/20/2022 12:29 PM    INDICATION: Neurological deficit, acute, stroke suspected.    COMPARISON: Head CT 1/19/2022.    TECHNIQUE: Routine multiplanar multisequence head MRI without intravenous contrast.    FINDINGS:  INTRACRANIAL CONTENTS: Mild asymmetrically increased diffusion signal within the pulvinar of the thalami bilaterally. There is likely mild increased diffusion signal within basal ganglia structures. Slight asymmetric increased diffusion signal within the   posterior frontal and parietal cortex. Questionable decreased ADC values within the anterior frontal cortex bilaterally.    Increased T2 signal corresponding with areas of diffusion signal change, most notably within the basal ganglia and posterior thalami, and likely generalized increased T2 signal within the cerebral cortex preferentially posteriorly. Probable symmetric   increased T2 FLAIR signal within the cerebellar folia bilaterally. No acute hemorrhage or mass effect. No hydrocephalus.    SELLA: No abnormality accounting for technique.    OSSEOUS STRUCTURES/SOFT TISSUES: Normal marrow signal. The major intracranial vascular flow voids are maintained.     ORBITS: No abnormality accounting for technique.     SINUSES/MASTOIDS: Localized secretions within the sphenoid sinuses and retention  cysts within the right maxillary sinus. No middle ear or mastoid effusion.       Impression    IMPRESSION: Findings suspicious for hypoxic ischemic injury involving deep gray matter structures including basal ganglia and posterior thalami. There is probable generalized involvement of cortical gray matter as well, though less conspicuous. No   significant mass effect or hemorrhage.           Billing: This patient is critically ill: Yes. Total critical care time today 40min.  Patient critically ill.

## 2022-01-21 NOTE — PLAN OF CARE
Problem: Communication Impairment (Mechanical Ventilation, Invasive)  Goal: Effective Communication  Outcome: Completed     Problem: Device-Related Complication Risk (Mechanical Ventilation, Invasive)  Goal: Optimal Device Function  Outcome: Completed     Problem: Inability to Wean (Mechanical Ventilation, Invasive)  Goal: Mechanical Ventilation Liberation  Outcome: Completed     Problem: Nutrition Impairment (Mechanical Ventilation, Invasive)  Goal: Optimal Nutrition Delivery  Outcome: Completed     Problem: Skin and Tissue Injury (Mechanical Ventilation, Invasive)  Goal: Absence of Device-Related Skin and Tissue Injury  Outcome: Completed     Problem: Ventilator-Induced Lung Injury (Mechanical Ventilation, Invasive)  Goal: Absence of Ventilator-Induced Lung Injury  Outcome: Completed  Renee Whiting, RT

## 2022-01-21 NOTE — PROGRESS NOTES
NEUROLOGY INPATIENT PROGRESS NOTE       [facility] NEUROLOGY [place]  [address] [place] [address]  Tel: [phone]  Fax: [phone]  [email or web address]     ASSESSMENT & PLAN   Hospital Day#: 7  Visit diagnosis: Anoxic encephalopathy    Anoxic encephalopathy  56 years old male with history of HTN, HLD admitted after patient had a cardiac arrest and was found to have LAD occlusion that was treated with drug-eluting stent.    CT x2 shows no acute ischemia    MRI brain suggest hypoxic ischemic injury involving deep gray matter involving basal ganglia and posterior thalami and generalized involvement of cortical gray matter as well    EEG shows generalized sharp discharges when patient is off propofol with periodic discharges at times suggesting anoxic brain damage.  This was confirmed with MRI yesterday    Continue Keppra 1000 mg twice daily.  Keppra level therapeutic at 24.2    Serum neuron-specific enolase is elevated at 21 that suggests anoxic brain damage and poor prognosis    MRI brain and elevated serum neuron-specific enolase suggests anoxic brain damage and prognosis is poor.  I have discussed patient's case with his wife and her parents are flying in from [place] today and family is leaning towards comfort care    Neurology Discharge Planning :   SOHAIL De La Cruz MD  [facility]  (Formerly, [facility]     PROBLEM LIST      Patient Active Problem List   Diagnosis Code     Cardiac arrest (H) I46.9     Essential hypertension I10     Hypercholesterolemia E78.00     Regular astigmatism of both eyes H52.223     Past Medical History:   Patient  has no past medical history on file.     SUBJECTIVE     No change in neuro status.  Repeat EEG when off propofol shows rudimentary periodic discharges that raise the possibility of anoxic brain damage.  MRI brain confirms anoxic brain damage.  Serum neuron-specific enolase is  elevated     OBJECTIVE     Vital signs in last 24 hours  Temp:  [98.8  F (37.1  C)-99.2  F (37.3  C)] 98.9  F (37.2  C)  Pulse:  [72-97] 80  Resp:  [12-24] 18  BP: (132-139)/(58-64) 132/58  MAP:  [68 mmHg-121 mmHg] 74 mmHg  Arterial Line BP: ()/(51-89) 104/57  FiO2 (%):  [50 %-60 %] 50 %  SpO2:  [89 %-100 %] 99 %    Weight:   Wt Readings from Last 1 Encounters:   01/21/22 101.5 kg (223 lb 12.3 oz)         I/O last 24 hours    Intake/Output Summary (Last 24 hours) at 1/18/2022 1909  Last data filed at 1/18/2022 1849  Gross per 24 hour   Intake 6070.42 ml   Output 7050 ml   Net -979.58 ml     Review of Systems   Pertinent items are noted in HPI.    General Physical Exam: Patient is alert and oriented x 0. Vital signs were reviewed and are documented in EMR. Neck was supple, no Carotid bruit, thyromegaly, JVD or lymphadenopathy noted.  Neurological Exam:  Patient alert and oriented x0 sedated and intubated pupil small and nonreactive.  Doll's eyes are absent.  .  Patient is assisting the vent.  No withdrawal to painful stimuli.  Reflexes 1+, equivocal toes downgoing.     DIAGNOSTIC STUDIES     Pertinent Radiology   Following imaging studies were reviewed:    CT  1/19/22  No acute intracranial process.    1/14/22  No evidence of acute intracranial hemorrhage or mass effect.    EEG  1/19/22  This is a severely abnormal EEG due to periodic discharges when patient is off propofol associated with rudimentary rhythmic activity that suggests electrographic seizure.  Periodic discharges can be seen due to anoxic brain damage, clinical correlation is recommended.  Early part of the tracing shows a burst suppression pattern that likely is pharmacologically induced  1/14/22  This is an abnormal EEG due to diffusely slow background in theta and delta range that suggests nonspecific generalized cerebral dysfunction that intermittently reacts to alerting procedure which is a good prognostic sign.  Such slowing can be seen due  to metabolic and toxic abnormalities.  No periodic discharges or a burst suppression pattern was noted to suggest anoxic brain damage.  If clinically indicated, a repeat tracing when patient is off sedating drugs will be helpful in evaluating for any cortical abnormality.     Echocardiogram  Echo result w/o MOPS: Interpretation Summary 1.The left ventricle is mildly dilated.2.Left ventricular function is decreased. The ejection fraction is 30-35%(moderately reduced).3.There is anterior wall akinesis.4.Normal right ventricle size and systolic function.4.The left atrium is moderately dilated.5.No hemodynamically significant valvular abnormalities on 2D or color flowimaging.6.IVC diameter and respiratory changes fall into an intermediate rangesuggesting an RA pressure of 8 mmHg.7.There is no comparison study available.    Pertinent Labs   Lab Results: Personally Reviewed  Component 2 d ago   Parmar Result SEE NOTE Abnormal     Comment:     Test                                 Result      Flag  Unit   RefValue   ----------------------------------------------------------------------   Neuron Specific Enolase, S           21           H    ng/mL  <=15                    Recent Results (from the past 24 hour(s))   Glucose by meter    Collection Time: 01/20/22  8:09 AM   Result Value Ref Range    GLUCOSE BY METER POCT 147 (H) 70 - 99 mg/dL   Blood gas arterial    Collection Time: 01/20/22  8:45 AM   Result Value Ref Range    pH Arterial 7.41 7.37 - 7.44    pCO2 Arterial 53 (H) 35 - 45 mm Hg    pO2 Arterial 80 80 - 90 mm Hg    Bicarbonate Arterial 31 (H) 23 - 29 mmol/L    O2 Sat, Arterial 96.2 96.0 - 97.0 %    Oxyhemoglobin 95.6 (L) 96.0 - 97.0 %    Base Excess/Deficit (+/-) 9.0   mmol/L    Ventilation Mode acvc     Rate 16 rr/min    FIO2 50     Peep 14 cm H2O    Sample Stabilized Temperature 37.0 degrees C    Ventilator Tidal Volume 500 mL   Respiratory Aerobic Bacterial Culture with Gram Stain    Collection Time: 01/20/22   9:27 AM    Specimen: Endotracheal; Sputum   Result Value Ref Range    Gram Stain Result >25 PMNs/low power field     Gram Stain Result No organisms seen    Potassium    Collection Time: 01/20/22 10:35 AM   Result Value Ref Range    Potassium 3.1 (L) 3.5 - 5.0 mmol/L   Glucose by meter    Collection Time: 01/20/22 12:03 PM   Result Value Ref Range    GLUCOSE BY METER POCT 147 (H) 70 - 99 mg/dL   Glucose by meter    Collection Time: 01/20/22  3:48 PM   Result Value Ref Range    GLUCOSE BY METER POCT 128 (H) 70 - 99 mg/dL   Potassium    Collection Time: 01/20/22  7:10 PM   Result Value Ref Range    Potassium 2.9 (L) 3.5 - 5.0 mmol/L   Glucose by meter    Collection Time: 01/20/22  7:42 PM   Result Value Ref Range    GLUCOSE BY METER POCT 146 (H) 70 - 99 mg/dL   Glucose by meter    Collection Time: 01/20/22 11:46 PM   Result Value Ref Range    GLUCOSE BY METER POCT 118 (H) 70 - 99 mg/dL   CBC with platelets    Collection Time: 01/21/22  3:55 AM   Result Value Ref Range    WBC Count 10.9 4.0 - 11.0 10e3/uL    RBC Count 3.26 (L) 4.40 - 5.90 10e6/uL    Hemoglobin 9.9 (L) 13.3 - 17.7 g/dL    Hematocrit 30.1 (L) 40.0 - 53.0 %    MCV 92 78 - 100 fL    MCH 30.4 26.5 - 33.0 pg    MCHC 32.9 31.5 - 36.5 g/dL    RDW 15.1 (H) 10.0 - 15.0 %    Platelet Count 134 (L) 150 - 450 10e3/uL   Basic metabolic panel    Collection Time: 01/21/22  3:55 AM   Result Value Ref Range    Sodium 152 (HH) 136 - 145 mmol/L    Potassium 3.3 (L) 3.5 - 5.0 mmol/L    Chloride 109 (H) 98 - 107 mmol/L    Carbon Dioxide (CO2) 36 (H) 22 - 31 mmol/L    Anion Gap 7 5 - 18 mmol/L    Urea Nitrogen 31 (H) 8 - 22 mg/dL    Creatinine 1.12 0.70 - 1.30 mg/dL    Calcium 8.1 (L) 8.5 - 10.5 mg/dL    Glucose 189 (H) 70 - 125 mg/dL    GFR Estimate 77 >60 mL/min/1.73m2   Magnesium    Collection Time: 01/21/22  3:55 AM   Result Value Ref Range    Magnesium 2.3 1.8 - 2.6 mg/dL   Phosphorus    Collection Time: 01/21/22  3:55 AM   Result Value Ref Range    Phosphorus 2.8 2.5 -  4.5 mg/dL   Heparin Unfractionated Anti Xa Level    Collection Time: 01/21/22  3:55 AM   Result Value Ref Range    Anti Xa Unfractionated Heparin 0.15 For Reference Range, See Comment IU/mL   Glucose by meter    Collection Time: 01/21/22  3:55 AM   Result Value Ref Range    GLUCOSE BY METER POCT 168 (H) 70 - 99 mg/dL         HOSPITAL MEDICATIONS       albuterol  2.5 mg Nebulization 2 times daily     artificial tears   Both Eyes Q8H     aspirin  81 mg Oral or Feeding Tube Daily     atorvastatin  80 mg Per OG Tube QPM     [Held by provider] carvedilol  12.5 mg Oral or Feeding Tube BID     chlorhexidine  15 mL Mouth/Throat Q12H     insulin aspart  1-6 Units Subcutaneous Q4H     levETIRAcetam  1,000 mg Intravenous Q12H     pantoprazole (PROTONIX) IV  40 mg Intravenous QAM AC     piperacillin-tazobactam  3.375 g Intravenous Q8H     protein modular  1 packet Per Feeding Tube BID     sodium chloride (PF)  10-40 mL Intracatheter Q7 Days     ticagrelor  90 mg Oral or Feeding Tube BID     vancomycin  1,500 mg Intravenous Q24H        Total time spent for face to face visit, reviewing labs/imaging studies, counseling and coordination of care was: 30 Minutes More than 50% of this time was spent on counseling and coordination of care.        This note was dictated using voice recognition software.  Any grammatical or context distortions are unintentional and inherent to the software.

## 2022-01-22 NOTE — PLAN OF CARE
Problem: Adult Inpatient Plan of Care  Goal: Plan of Care Review  Outcome: Change based on patient need/priority  Goal: Patient-Specific Goal (Individualized)  Outcome: Change based on patient need/priority  Goal: Absence of Hospital-Acquired Illness or Injury  Outcome: Change based on patient need/priority  Intervention: Prevent Skin Injury  Recent Flowsheet Documentation  Taken 2022 1048 by Darío Reeves RN  Body Position:   turned   left  Taken 2022 0900 by Darío Reeves RN  Body Position:   turned   right  Goal: Optimal Comfort and Wellbeing  Outcome: Change based on patient need/priority  Goal: Readiness for Transition of Care  Outcome: Change based on patient need/priority     Problem: Risk for Delirium  Goal: Optimal Coping  Outcome: Change based on patient need/priority  Goal: Improved Behavioral Control  Outcome: Change based on patient need/priority  Goal: Improved Attention and Thought Clarity  Outcome: Change based on patient need/priority  Goal: Improved Sleep  Outcome: Change based on patient need/priority     Problem: Adjustment to Illness (Targeted Temperature Management)  Goal: Optimal Response to Life-Threatening Event  Outcome: Change based on patient need/priority     Problem: Dysrhythmia (Targeted Temperature Management)  Goal: Stable Cardiac Rate and Rhythm  Outcome: Change based on patient need/priority     Problem: Hemodynamic Instability (Targeted Temperature Management)  Goal: Effective Tissue Perfusion  Outcome: Change based on patient need/priority     Problem: Infection (Targeted Temperature Management)  Goal: Absence of Infection Signs and Symptoms  Outcome: Change based on patient need/priority     Problem: Seizure, Active Management  Goal: Absence of Seizure/Seizure-Related Injury  Outcome: Change based on patient need/priority     Pt given comfort care meds per MAR during shift.  Ativan, Morphine, Robinol, and scopalamine patch given. Pt  at 1530.  Spouse in  room at the time.  Pronounced by Dr. Cristobal.  [employer] notified.

## 2022-01-22 NOTE — PLAN OF CARE
Problem: Adult Inpatient Plan of Care  Goal: Absence of Hospital-Acquired Illness or Injury  Intervention: Prevent Skin Injury  Recent Flowsheet Documentation  Taken 1/22/2022 0300 by Marilyn Interiano RN  Body Position:   left   turned  Taken 1/22/2022 0200 by Marilyn Interiano RN  Body Position:   left   turned  Taken 1/22/2022 0000 by Marilyn Interiano RN  Body Position:   right   turned  Taken 1/21/2022 2200 by Marilyn Interiano RN  Body Position:   turned   supine  Taken 1/21/2022 2000 by Marilyn Interiano RN  Body Position:   left   turned     Problem: Adult Inpatient Plan of Care  Goal: Optimal Comfort and Wellbeing  Outcome: No Change     Problem: Seizure, Active Management  Goal: Absence of Seizure/Seizure-Related Injury  Outcome: No Change   Pt comfort cares.  Repositioned q 2 hours.  Wife at bedside.  Prn morphine and ativan for discomfort.  Will continue to monitor

## 2022-01-22 NOTE — PROGRESS NOTES
Critical Care Progress Note      01/22/2022    Name: Jonathan De La Rosa MRN#: [id number]   Age: 56 year old YOB: 1965     Hsptl Day# 8  ICU DAY #    MV DAY #             Problem List:   Acute respiratory failure  Status postcardiac arrest  STEMI  Postcardiac arrest shock  Leukocytosis  Acute kidney injury versus CKD.  Unknown baseline.  History of hypertension and hyperlipidemia    Clinically Significant Risk Factors Present on Admission                  Code Status: No CPR- Do NOT Intubate          Summary/Hospital Course:   57 yo male with hx of HTN and HLD. Per wife he developed chest pain on 1/13 at noon. Today he collapsed at home and his wife started CPR. When EMS arrived he had a shockable rhythm and shocked x2 at home and once en route to the hospital.  He was taken to the cath lab after EKG showed an anterior STEMI.   A clot was extracted from the proximal LAD followed by a JUAN JOSE placement.    He was started on TTM and transferred to [facility]  Transition to comfort cares on 1/21      Assessment and plan :        I have personally reviewed the daily labs, imaging studies, cultures and discussed the case with referring physician and consulting physicians.     My assessment and plan by system for this patient is as follows:    Neurology/Psychiatry:   Findings consistent with anoxic brain injury.  MRI and EEG both abnormal.  Elevated NSE.    Transition to comfort cares.  Glycopyrrolate and scopolamine to help with secretions    Cardiovascular:   STEMI with cardiac arrest.  S/p JUAN JOSE in proximal LAD.    Status post TTM.    Off pressors.  LV EF 30 to 35%.    Lower extremity DVT plus thrombus seen in IVC.  Was on a heparin drip but that has been discontinued as part of comfort measures.    Pulmonary/Ventilator Management:   Acute respiratory failure.   Extubated compassionately on 1/21.      GI and Nutrition :   N.p.o.                             Key Medications:       artificial tears   Both Eyes Q8H      chlorhexidine  15 mL Mouth/Throat Q12H     levETIRAcetam  1,000 mg Intravenous Q12H     scopolamine  1 patch Transdermal Q72H    And     scopolamine   Transdermal Q8H     sodium chloride (PF)  10-40 mL Intracatheter Q7 Days       dextrose                 Physical Examination:   Temp:  [102.5  F (39.2  C)-103.1  F (39.5  C)] 103.1  F (39.5  C)  Pulse:  [124-178] 151  Resp:  [17-38] 26  MAP:  [100 mmHg-110 mmHg] 110 mmHg  Arterial Line BP: (150-163)/(76-90) 156/90  SpO2:  [61 %-83 %] 61 %  No intake or output data in the 24 hours ending 01/14/22 2024  Wt Readings from Last 4 Encounters:   01/21/22 101.5 kg (223 lb 12.3 oz)   01/14/22 70 kg (154 lb 5.2 oz)     Arterial Line BP: (150-163)/(76-90) 156/90  MAP:  [100 mmHg-110 mmHg] 110 mmHg  Ventilation Mode: CMV/AC  (Continuous Mandatory Ventilation/ Assist Control)  FiO2 (%): 40 %  Rate Set (breaths/minute): 16 breaths/min  Tidal Volume Set (mL): 500 mL  PEEP (cm H2O): (S) 10 cmH2O  Oxygen Concentration (%): 40 %  Resp: 26    Recent Labs   Lab 01/20/22  0845 01/16/22  0551 01/15/22  1510   PH 7.41 7.40 7.38   PCO2 53* 34* 34*   PO2 80 113* 158*   HCO3 31* 22* 21*   O2PER 50 40 40       Patient has been transition to comfort measures.  No physical exam performed.         Data:   All data and imaging reviewed     ROUTINE ICU LABS (Last four results)  CMP  Recent Labs   Lab 01/21/22  1158 01/21/22  1024 01/21/22  0930 01/21/22  0355 01/20/22  1942 01/20/22  1910 01/20/22  1203 01/20/22  1035 01/20/22  0809 01/20/22  0421 01/19/22  1140 01/19/22  1129 01/19/22  0811 01/19/22  0528 01/18/22  0900 01/18/22  0420 01/17/22  0823 01/17/22  0409 01/16/22  0837 01/16/22  0551   NA  --   --   --  152*  --   --   --   --   --  152*  --  149*  --   --   --  146*  --  148*  --  145   POTASSIUM  --  3.5  --  3.3*  --  2.9*  --  3.1*  --  2.9*  --  3.8  --  4.0   < > 3.1*   < > 3.2*   < > 3.6  3.6   CHLORIDE  --   --   --  109*  --   --   --   --   --  112*  --  113*  --   --   --   116*  --  118*  --  118*   CO2  --   --   --  36*  --   --   --   --   --  30  --  27  --   --   --  22  --  20*  --  19*   ANIONGAP  --   --   --  7  --   --   --   --   --  10  --  9  --   --   --  8  --  10  --  8   *  --  148* 189*  168*   < >  --    < >  --    < > 136*   < > 189*   < >  --    < > 147*   < > 120   < > 113   BUN  --   --   --  31*  --   --   --   --   --  38*  --  38*  --   --   --  31*  --  26*  --  23*   CR  --   --   --  1.12  --   --   --   --   --  1.30  --  1.39*  --   --   --  1.10  --  1.02  --  0.96   GFRESTIMATED  --   --   --  77  --   --   --   --   --  64  --  59*  --   --   --  79  --  86  --  >90   NATACHA  --   --   --  8.1*  --   --   --   --   --  7.8*  --  7.4*  --   --   --  8.0*  --  7.8*  --  7.5*   MAG  --   --   --  2.3  --   --   --   --   --  2.2  --   --   --  2.2  --  2.1  --  2.3  --  1.5*   PHOS  --   --   --  2.8  --   --   --   --   --  3.1  --   --   --  4.4  --  1.6*  --   --   --   --    PROTTOTAL  --   --   --   --   --   --   --   --   --   --   --   --   --   --   --   --   --  5.0*  --  4.7*   ALBUMIN  --   --   --   --   --   --   --   --   --   --   --   --   --   --   --   --   --  2.4*  --  2.6*   BILITOTAL  --   --   --   --   --   --   --   --   --   --   --   --   --   --   --   --   --  0.7  --  0.6   ALKPHOS  --   --   --   --   --   --   --   --   --   --   --   --   --   --   --   --   --  74  --  75   AST  --   --   --   --   --   --   --   --   --   --   --   --   --  37  --   --   --  131*  --  215*   ALT  --   --   --   --   --   --   --   --   --   --   --   --   --  56*  --   --   --  123*  --  166*    < > = values in this interval not displayed.     CBC  Recent Labs   Lab 01/21/22  0355 01/20/22  0421 01/19/22  0528 01/18/22  1846   WBC 10.9 13.8* 15.2* 18.8*   RBC 3.26* 3.12* 3.16* 3.50*   HGB 9.9* 9.4* 9.8* 10.6*   HCT 30.1* 28.6* 29.4* 32.1*   MCV 92 92 93 92   MCH 30.4 30.1 31.0 30.3   MCHC 32.9 32.9 33.3 33.0   RDW 15.1* 15.1* 15.0  14.9   * 116* 107* 112*     INR  Recent Labs   Lab 01/16/22  0854 01/16/22  0551 01/16/22  0004   INR 1.26* 1.24* 1.29*     Arterial Blood Gas  Recent Labs   Lab 01/20/22  0845 01/16/22  0551 01/15/22  1510   PH 7.41 7.40 7.38   PCO2 53* 34* 34*   PO2 80 113* 158*   HCO3 31* 22* 21*   O2PER 50 40 40       All cultures:  No results for input(s): CULT in the last 168 hours.  No results found for this or any previous visit (from the past 24 hour(s)).

## 2022-01-22 NOTE — DISCHARGE SUMMARY
[facility] Discharge Summary    Jonathan De La Rosa MRN# [id number]   Age: 56 year old YOB: 1965     Date of Admission:  1/14/2022  Date of Discharge::  1/22/2022  Admitting Physician:  Dalton Cristobal MD  Discharge Physician:  Dalton Cristobal MD  Primary Physician: Praveen Loaiza            Discharge Diagnosis:   Acute respiratory failure  Status postcardiac arrest  Anoxic brain injury  STEMI with ventricular fibrillation  Postcardiac arrest shock  Leukocytosis  Acute kidney injury versus CKD.  Unknown baseline.  History of hypertension and hyperlipidemia  MSSA pneumonia  Tobacco use         Hospital Course:   Mr. De La Rosa was admitted here on 1/14. He was transferred from [facility] where he was taken after he suffered cardiac arrest at home. In the emergency room he was diagnosed with an anterior wall STEMI and taken to the Cath Lab where he had a drug-eluting stent placed in his proximal LAD.  He was transferred here that evening and started on hypothermia protocol.  He was hemodynamically unstable overnight so his goal temperature was increased to 36 degrees from 34 degrees. He was treated with amiodarone for nonsustained VT.  Upon arrival here he was also started on Zosyn for possible aspiration pneumonia. Sputum ended up growing MSSA and Streptococcus.    Unfortunately his neurological status did not improve. CT head was negative. EEG showed periodic discharges pattern and MRI showed anoxic brain injury.    Family opted to proceed with comfort measures.  He was extubated on 1/21.    He passed away on 1/22/2022 at 15:30.    Dalton Cristobal MD

## 2022-01-22 NOTE — PROGRESS NOTES
Called to pronounce patient's death.  No signs of respiratory or cardiac activity.    Mr. De La Rosa was pronounced dead at 15:30  On 1/22/2022

## 2022-01-22 NOTE — PROGRESS NOTES
"  Cardiology Progress Note    Assessment:  1.  STEMI, acute thrombotic occlusion of the left anterior descending with witnessed arrest approximately 15-minute downtime by report.  Coronary procedure January 14, 2021.  Initial documentation of nonsustained ventricular tachycardia previously on amiodarone which has been held.  Echo from January 14 reported an EF of 30 to 35%.  Patient transitioned to comfort care yesterday per chart review.  Chart check.  Cardiology will sign off.  Please call if any cardiovascular questions.      Active Problems:    Cardiac arrest (H)      Plan:  Cardiology will sign off.    Subjective:   Jonathan De La Rosa is seen in follow-up.  First visit for this examiner.  Chart record reviewed.  Acute chest pain January 13 with out of hospital arrest with wife starting CPR.  Shocked x2 for EMS based on chart review.  Emergent cardiac catheterization 1/14 with thrombotic occlusion of the proximal LAD with significant distal disease in the LAD and RCA.  Unfortunately with persistent anoxic encephalopathy with notes reviewed from neurology and ICU team.  Chart reviewed.  Patient transition to comfort care yesterday and extubated and placed on 2 L per nasal cannula.    Intubated, sedated    Objective:   Vital signs in last 24 hours:  VITALS: /58   Pulse (!) 129   Temp (!) 102.5  F (39.2  C) (Axillary)   Resp 26   Ht 1.854 m (6' 1\")   Wt 101.5 kg (223 lb 12.3 oz)   SpO2 (!) 74%   BMI 29.52 kg/m    BMI: Body mass index is 29.52 kg/m .  Wt Readings from Last 3 Encounters:   01/21/22 101.5 kg (223 lb 12.3 oz)   01/14/22 70 kg (154 lb 5.2 oz)       Intake/Output Summary (Last 24 hours) at 1/22/2022 0758  Last data filed at 1/21/2022 1800  Gross per 24 hour   Intake 1297.7 ml   Output 4850 ml   Net -3552.3 ml           Cardiographics:    ECG:    Sinus rhythm with Fusion complexes /competes with junctional   Anteroseptal infarct (cited on or before 14-JAN-2022)   ** ** ACUTE MI / STEMI ** ** "   Abnormal ECG   When compared with ECG of 15-MEGAN-2022 08:26,   Fusion complexes are now Present   QRS axis Shifted right   Serial changes of Anteroseptal infarct Present   Confirmed by RENETTA SCHNEIDER MD LOC:[id number]) on 2022 12:22:35 PM  Sinus rhythm with occasional Premature ventricular complexes   some junctional beats   AV conduction intact   Indeterminate axis   Anterolateral infarct (cited on or before 2022)   Prolonged QT   Abnormal ECG   When compared with ECG of 2022 19:30,   Vent. rate has decreased BY  35 BPM   Serial changes of evolving Anterior infarct Present   Serial changes of evolving Anterolateral infarct Present   Non-specific intra-ventricular conduction delay   junctional rhythm competes with NSR   Confirmed by RENETTA SCHNEIDER MD LOC:[id number]) on 2022 12:20:07 PM        Name: CHRISTINE CORTES  MRN: [id number]  : 1965  Study Date: 01/15/2022 10:03 AM  Age: 56 yrs  Gender: Male  Patient Location: [facility]  Reason For Study: Acute Myocardial Infarction  Ordering Physician: PARKER NUNES  Referring Physician: EMMA OATES  Performed By: CHARMAINE     BSA: 2.1 m2  Height: 73 in  Weight: 190 lb  HR: 61  ______________________________________________________________________________  Procedure  Definity ([facility] #73197-093) given intravenously. No hemodynamically significant  valvular abnormalities on 2D or color flow imaging. There is no comparison  study available.  ______________________________________________________________________________  Interpretation Summary     1.The left ventricle is mildly dilated.  2.Left ventricular function is decreased. The ejection fraction is 30-35%  (moderately reduced).  3.There is anterior wall akinesis.  4.Normal right ventricle size and systolic function.  4.The left atrium is moderately dilated.  5.No hemodynamically significant valvular abnormalities on 2D or color flow  imaging.  6.IVC diameter and respiratory changes fall into  an intermediate range  suggesting an RA pressure of 8 mmHg.  7.There is no comparison study available      Imaging:   Chest X-Ray: Study Result    Narrative & Impression   EXAM: XR CHEST PORT 1 VIEW  LOCATION: [facility]  DATE/TIME: 1/18/2022 4:47 PM     INDICATION: decreased o2 sats  COMPARISON: Portable AP view of the chest 01/18/2022 at 0617 hours, 01/17/2022                                                                      IMPRESSION:      Tip of the ET tube is 8 cm above the david and projects superior to the level of the clavicle heads and could be safely advanced 3 cm for safer position. Unchanged position of the gastric tube coursing below the left hemidiaphragm coiled in the left   upper quadrant. Right PICC terminates in the mid to lower SVC.     Cardiac silhouette is normal in size and the mediastinal borders remain well-defined.     Extensive bilateral airspace opacities are present greatest in the medial lower lobes. The medial two thirds of the left hemidiaphragm are silhouetted consistent with adjacent airspace opacity and/or atelectasis. No clear progression of airspace disease   compared with yesterday.     No visible pleural fluid. No pneumothorax.       Jonathan De La Rosa [id number] 56 year old male       Physicians    Panel Physicians Referring Physician Case Authorizing Physician   Leo Gallegos MD (Primary)  Chilo Fitch MD     Procedures    Therapeutic Hypothermia   coronary angiogram   Percutaneous Coronary Intervention Stent Drug Eluting   Intravascular Ultrasound   Left Heart Cath   Thrombectomy Coronary     Indications    Cardiac arrest (H) [I46.9 (ICD-10-CM)]     Comments/Patient Narrative    57 yo male who suffered out of hospital cardiac arrest this afternoon. He was resuscitated at home and required 3 shocks for shockable rhythm. He was subsequently brought to the ER where ECG revealed anterior ST elevation. He was intubated in the  ER and sent to the cath lab for emergent coronary angiography and possible revascularization.     Pre Procedure Diagnosis    other         Conclusion    1. Out of hospital cardiac arrest due to acute anterior STEMI  2. Status post successful IVUS-guided PCI with JUAN JOSE placement in the proximal LAD          Plan      Follow bedrest per protocol    Continued medical management and lifestyle modifications for cardiovascular risk factor optimizations.    Continuation of dual antiplatelet therapy for 12 months   Continue Cangrelor until Brilinta is given. Consider staged PCI of the distal RCA lesion at later date.     Coronary Findings      Diagnostic  Dominance: Right    Left Main   The vessel was visualized by selective angiography and is moderate in size. There was 0% vessel disease.   Left Anterior Descending   Prox LAD lesion is 100% stenosed. Culprit lesion. The lesion is thrombotic.   Mid LAD lesion is 30% stenosed.   Dist LAD lesion is 95% stenosed. Lesion is at the apical LAD. Vessel is small beyond the lesion   Left Circumflex   First Obtuse Marginal Branch   1st Mrg lesion is 20% stenosed.   Right Coronary Artery   Prox RCA lesion is 25% stenosed.   Dist RCA lesion is 70% stenosed. The lesion is discrete.        Lab Results    Chemistry/lipid CBC Cardiac Enzymes/BNP/TSH/INR   Recent Labs   Lab Test 01/18/22  0420   TRIG 228*     No results for input(s): LDL in the last 44758 hours.  Recent Labs   Lab Test 01/21/22  1158 01/21/22  1024 01/21/22  0930 01/21/22  0355   NA  --   --   --  152*   POTASSIUM  --  3.5  --  3.3*   CHLORIDE  --   --   --  109*   CO2  --   --   --  36*   *  --    < > 189*  168*   BUN  --   --   --  31*   CR  --   --   --  1.12   GFRESTIMATED  --   --   --  77   NATACHA  --   --   --  8.1*    < > = values in this interval not displayed.     Recent Labs   Lab Test 01/21/22  0355 01/20/22  0421 01/19/22  1129   CR 1.12 1.30 1.39*     Recent Labs   Lab Test 01/15/22  0039   A1C 5.5           Recent Labs   Lab Test 01/21/22  0355   WBC 10.9   HGB 9.9*   HCT 30.1*   MCV 92   *     Recent Labs   Lab Test 01/21/22  0355 01/20/22  0421 01/19/22  0528   HGB 9.9* 9.4* 9.8*    No results for input(s): TROPONINI in the last 61010 hours.  No results for input(s): BNP, NTBNPI, NTBNP in the last 49361 hours.  No results for input(s): TSH in the last 54764 hours.  Recent Labs   Lab Test 01/16/22  0854 01/16/22  0551 01/16/22  0004   INR 1.26* 1.24* 1.29*

## 2022-01-23 LAB
1,3 BETA GLUCAN SER-MCNC: 331 PG/ML
OBSERVATION IMP: POSITIVE

## 2023-02-28 NOTE — PROGRESS NOTES
Called in, for emotional and spiritual support to family following a death. Prayed with family as requested. Family appreciated the spiritual support. No follow up needed.   yes

## (undated) DEVICE — CATH BALLOON EMERGE 2.5X12MM H7493918912250

## (undated) DEVICE — GUIDEWIRE VASC 0.014INX180CM RUNTHROUGH 25-1011

## (undated) DEVICE — INTRO SHEATH 6FRX10CM PINNACLE RSS602

## (undated) DEVICE — RAD G/W INQWIRE .035X260CM J-TIP EXCHANGE IQ35F260J1O5RS

## (undated) DEVICE — CATH ANGIO INFINITI PIGTAIL 145 6 SH 6FRX110CM  534-652S

## (undated) DEVICE — KIT HAND CONTROL ANGIOTOUCH ACIST 65CM AT-P65

## (undated) DEVICE — INTRO GLIDESHEATH SLENDER 6FR 10X45CM 60-1060

## (undated) DEVICE — CATH DIAGNOSTIC RADIAL 5FR TIG 4.0

## (undated) DEVICE — VALVE HEMOSTASIS .096" COPILOT MECH 1003331

## (undated) DEVICE — INTRO SHEATH 7FRX10CM PINNACLE RSS702

## (undated) DEVICE — KIT LG BORE TOUHY ACCESS PLUS MAP152

## (undated) DEVICE — CATH US OD 5FR OD SEC 2.9FR EAGLE EYE PLATINUM 0.014 85900P

## (undated) DEVICE — CATH GUIDING BLUE YELLOW PTFE XB3.5 6FRX100CM 67005400

## (undated) DEVICE — CATH PRONTO EXTRACTION 5010

## (undated) DEVICE — RAD INFLATOR BASIC COMPAK  IN4130

## (undated) DEVICE — CATH ANGIO JUDKINS R4 6FRX100CM INFINITI 534621T

## (undated) DEVICE — MANIFOLD KIT ANGIO AUTOMATED 014613

## (undated) DEVICE — CATH BALLOON NC EMERGE 4.00X8MM H7493926708400

## (undated) DEVICE — CATH QUATTRO 9.3FR  FEM INSTRN

## (undated) DEVICE — GUIDEWIRE VASC 0.035INX150CM INQWIRE J TIP IQ35F150J3F/A

## (undated) DEVICE — CATH BALLOON NC EMERGE 3.50X12MM H7493926712350

## (undated) DEVICE — DEFIB PRO-PADZ LVP LQD GEL ADULT 8900-2105-01

## (undated) RX ORDER — PROPOFOL 10 MG/ML
INJECTION, EMULSION INTRAVENOUS
Status: DISPENSED
Start: 2022-01-01